# Patient Record
Sex: FEMALE | Race: WHITE | HISPANIC OR LATINO | Employment: FULL TIME | URBAN - METROPOLITAN AREA
[De-identification: names, ages, dates, MRNs, and addresses within clinical notes are randomized per-mention and may not be internally consistent; named-entity substitution may affect disease eponyms.]

---

## 2017-05-14 ENCOUNTER — HOSPITAL ENCOUNTER (EMERGENCY)
Facility: HOSPITAL | Age: 42
Discharge: HOME | End: 2017-05-14
Attending: EMERGENCY MEDICINE | Admitting: EMERGENCY MEDICINE
Payer: COMMERCIAL

## 2017-05-14 ENCOUNTER — APPOINTMENT (EMERGENCY)
Dept: RADIOLOGY | Facility: HOSPITAL | Age: 42
End: 2017-05-14
Payer: COMMERCIAL

## 2017-05-14 VITALS
OXYGEN SATURATION: 99 % | HEART RATE: 95 BPM | BODY MASS INDEX: 21.84 KG/M2 | WEIGHT: 123.24 LBS | RESPIRATION RATE: 18 BRPM | TEMPERATURE: 98 F | HEIGHT: 63 IN | DIASTOLIC BLOOD PRESSURE: 63 MMHG | SYSTOLIC BLOOD PRESSURE: 131 MMHG

## 2017-05-14 DIAGNOSIS — S89.92XA LEFT KNEE INJURY, INITIAL ENCOUNTER: Primary | ICD-10-CM

## 2017-05-14 PROCEDURE — 73564 X-RAY EXAM KNEE 4 OR MORE: CPT

## 2017-05-14 PROCEDURE — 99283 EMERGENCY DEPT VISIT LOW MDM: CPT

## 2017-05-18 ENCOUNTER — ALLSCRIPTS OFFICE VISIT (OUTPATIENT)
Dept: OTHER | Facility: OTHER | Age: 42
End: 2017-05-18

## 2017-05-18 ENCOUNTER — TRANSCRIBE ORDERS (OUTPATIENT)
Dept: ADMINISTRATIVE | Facility: HOSPITAL | Age: 42
End: 2017-05-18

## 2017-05-18 DIAGNOSIS — M23.92 DERANGEMENT OF COLLATERAL LIGAMENT OF LEFT KNEE: Primary | ICD-10-CM

## 2017-05-18 DIAGNOSIS — M23.92 INTERNAL DERANGEMENT OF LEFT KNEE: ICD-10-CM

## 2017-10-04 ENCOUNTER — ALLSCRIPTS OFFICE VISIT (OUTPATIENT)
Dept: OTHER | Facility: OTHER | Age: 42
End: 2017-10-04

## 2017-10-04 DIAGNOSIS — R07.89 OTHER CHEST PAIN: ICD-10-CM

## 2017-10-04 DIAGNOSIS — F43.9 REACTION TO SEVERE STRESS: ICD-10-CM

## 2017-10-04 DIAGNOSIS — R68.82 DECREASED LIBIDO: ICD-10-CM

## 2017-10-04 DIAGNOSIS — K21.9 GASTRO-ESOPHAGEAL REFLUX DISEASE WITHOUT ESOPHAGITIS: ICD-10-CM

## 2017-10-04 DIAGNOSIS — R53.83 OTHER FATIGUE: ICD-10-CM

## 2017-10-04 DIAGNOSIS — G43.909 MIGRAINE WITHOUT STATUS MIGRAINOSUS, NOT INTRACTABLE: ICD-10-CM

## 2017-10-05 NOTE — PROGRESS NOTES
Assessment  1  Esophageal reflux (530 81) (K21 9)   2  Migraine headache (346 90) (G43 909)   3  Decreased libido without sexual dysfunction (799 81) (R68 82)   4  Fatigue (780 79) (R53 83)   5  Stress (V62 89) (F43 9)   6  Atypical chest pain (786 59) (R07 89)   7  Encounter for screening mammogram for malignant neoplasm of breast (V76 12)   (Z12 31)    Plan  Atypical chest pain, Decreased libido without sexual dysfunction, Esophageal reflux,  Fatigue, Migraine headache, Stress    · (1) LIPID PANEL, FASTING; Status:Active; Requested for:04Oct2017;   Decreased libido without sexual dysfunction, Esophageal reflux, Fatigue, Migraine  headache, Stress    · (1) CBC/PLT/DIFF; Status:Active; Requested for:04Oct2017;    · (1) COMPREHENSIVE METABOLIC PANEL; Status:Active; Requested RQY:[id number];    · (1) FERRITIN; Status:Active; Requested for:04Oct2017;    · (1) TSH; Status:Active; Requested for:04Oct2017;   Encounter for screening mammogram for malignant neoplasm of breast    · * MAMMO SCREENING BILATERAL W CAD; Status:Hold For - Scheduling; Requested  for:79Fzp7628;   Esophageal reflux    · Omeprazole 40 MG Oral Capsule Delayed Release; TAKE ONE CAPSULE BY  MOUTH ONE TIME DAILY    Discussion/Summary    Check labs, will call with results  check mammo upon turning 42  consider counseling, but relationship may lack the emotional component as patient and partner want different things out of the relationship  Possible side effects of new medications were reviewed with the patient/guardian today  The treatment plan was reviewed with the patient/guardian  The patient/guardian understands and agrees with the treatment plan      Chief Complaint  patient presented here for medication refills      History of Present Illness  h/o GERD dx'd by barium swallow  c/o stress which presents as chest pain, difficulty swallowing  all these sx improve with omeprazole   still with chronic headache, which improved after stopping OCP and switching to paraguard  h/a appear in the L side of her head, last for seconds, ever since 2003 when she was dx'd with lyme  also h/o cluster h/a/ migraines  headaches have not worsened in intensity or freq  c/o decreased libido  some discord in the relationship  not  with with her partner for over 15 yrs  pt is very work driven, self sufficient, loves her partner, but may not be in love with him  denies sexual dysfunction physiologically, just decreased interest, multiple factors, fatigue,works long hours  tries to have honest conversation with her partner about her feelings  Active Problems  1  Depression screening (V79 0) (Z13 89)   2  Esophageal reflux (530 81) (K21 9)   3  Migraine headache (346 90) (G43 909)    Past Medical History  1  History of Acute pain of left knee (719 46) (M25 562)   2  Acute pharyngitis (462) (J02 9)   3  Acute sinusitis (461 9) (J01 90)   4  History of Internal derangement of knee joint, left (717 9) (M23 92)    The active problems and past medical history were reviewed and updated today  Family History  Mother    1  Family history of thyroid disease (V18 19) (Z83 49)  Sister    2  Family history of thyroid disease (V18 19) (Z83 49)  Maternal Grandmother    3  Family history of cardiac disorder (V17 49) (Z82 49)   4  Family history of thyroid disease (V18 19) (Z83 49)    The family history was reviewed and updated today  Social History   · Being A Social Drinker   · Birth Control   · Never a smoker  The social history was reviewed and updated today  Current Meds   1  Naproxen 500 MG Oral Tablet; TAKE 1 TABLET TWICE DAILY AS NEEDED FOR   HEADACHE; Therapy: 23CXZ8886 to (44 279 135)  Requested for: 72Qdl0873; Last   Rx:[id number] Ordered   2  Omeprazole 40 MG Oral Capsule Delayed Release; TAKE ONE (1) CAPSULE(S) DAILY;    Therapy: 62VHZ8444 to (94 31 11)  Requested for: 79Xmo7827; Last   Rx:[id number] Ordered    The medication list was reviewed and updated today  Allergies  1  Penicillins    Vitals  Vital Signs    Recorded: 48FYM6465 05:05PM   Temperature 99 F, Tympanic   Heart Rate 96   Pulse Quality Normal   Respiration Quality Normal   Respiration 16   Systolic 001, LUE, Sitting   Diastolic 62, LUE, Sitting   Height 5 ft 3 5 in   Weight 123 lb    BMI Calculated 21 45   BSA Calculated 1 58   O2 Saturation 98   LMP 79Ony7468     Physical Exam    Constitutional   General appearance: No acute distress, well appearing and well nourished  Pulmonary   Respiratory effort: No increased work of breathing or signs of respiratory distress  Auscultation of lungs: Clear to auscultation  Cardiovascular   Auscultation of heart: Normal rate and rhythm, normal S1 and S2, without murmurs  Examination of extremities for edema and/or varicosities: Normal     Abdomen   Abdomen: Non-tender, no masses  Liver and spleen: No hepatomegaly or splenomegaly      Psychiatric   Orientation to person, place, and time: Normal     Mood and affect: Normal          Signatures   Electronically signed by : Penelope Kocher, M D ; Oct  4 2017  7:03PM EST                       (Author)

## 2017-12-06 DIAGNOSIS — Z12.31 ENCOUNTER FOR SCREENING MAMMOGRAM FOR MALIGNANT NEOPLASM OF BREAST: ICD-10-CM

## 2018-01-12 NOTE — RESULT NOTES
Verified Results  (1) THROAT CULTURE (CULTURE, UPPER RESPIRATORY) [id number] 02:18PM [facility]     Test Name Result Flag Reference   CLINICAL REPORT (Report)     Test:        Throat culture  Specimen Type:   Throat  Specimen Date:   7/7/2016 2:18 PM  Result Date:    7/9/2016 9:29 AM  Result Status:   Final result  Resulting Lab:   [address] [address]            Tel: [phone]      CULTURE                                       ------------------                                   Negative for beta-hemolytic Streptococcus

## 2018-01-13 VITALS
TEMPERATURE: 99 F | RESPIRATION RATE: 16 BRPM | SYSTOLIC BLOOD PRESSURE: 104 MMHG | BODY MASS INDEX: 21 KG/M2 | HEART RATE: 96 BPM | WEIGHT: 123 LBS | HEIGHT: 64 IN | DIASTOLIC BLOOD PRESSURE: 62 MMHG | OXYGEN SATURATION: 98 %

## 2018-01-13 VITALS
DIASTOLIC BLOOD PRESSURE: 76 MMHG | BODY MASS INDEX: 20.93 KG/M2 | SYSTOLIC BLOOD PRESSURE: 121 MMHG | HEART RATE: 87 BPM | WEIGHT: 122.63 LBS | HEIGHT: 64 IN

## 2018-01-18 NOTE — MISCELLANEOUS
Message   Recorded as Task   Date: 07/11/2016 08:30 AM, Created By: Ronan Cee   Task Name: Call Patient with results   Assigned To: Ronan Cee   Regarding Patient: Isabela Glover, Status: In Progress   CommentWklauspricila King - 11 Jul 2016 8:30 AM     Patient Phone: [phone]    throat   culture is  normal    Jitendra Chan - 11 Jul 2016 9:23 AM     TASK EDITED  PT STATES HER LEFT SIDE OF HER THROAT IS A LIITLE PAINFUL AND IT IS DIFFICULT TO Debi Mulch   Jitendra Chan - 11 Jul 2016 9:23 AM     TASK REASSIGNED: Previously Assigned To Chales Spurling - 11 Jul 2016 11:32 AM     TASK EDITED  left  message   Ronan Cee - 11 Jul 2016 11:32 AM     TASK IN PROGRESS   spoke with pt hse still has sore thraot and swollen glands   throat culture is negative  pt finished azithromycin  pt denies fever  will check for mono and cbc  change antibiotic to cipro 250 mg bid and add prednisone 10 mg bid      Plan  Acute pharyngitis    · Ciprofloxacin HCl - 250 MG Oral Tablet; TAKE 1 TABLET TWICE DAILY AS  DIRECTED   · PredniSONE 10 MG Oral Tablet; TAKE 1 TABLET TWICE DAILY   · (1) CBC/PLT/DIFF; Status:Active; Requested for:56Hts3456;    · (1) MONO TEST; Status:Active;  Requested for:42Wta9061;     Signatures   Electronically signed by : Tracey Candelario, [facility]; Jul 11 2016  1:03PM EST                       (Author)

## 2018-02-02 ENCOUNTER — TELEPHONE (OUTPATIENT)
Dept: FAMILY MEDICINE CLINIC | Facility: CLINIC | Age: 43
End: 2018-02-02

## 2018-02-02 DIAGNOSIS — Z20.828 EXPOSURE TO INFLUENZA: Primary | ICD-10-CM

## 2018-02-02 RX ORDER — OSELTAMIVIR PHOSPHATE 75 MG/1
75 CAPSULE ORAL DAILY
Qty: 10 CAPSULE | Refills: 0 | Status: SHIPPED | OUTPATIENT
Start: 2018-02-02 | End: 2018-02-12

## 2018-02-27 ENCOUNTER — OFFICE VISIT (OUTPATIENT)
Dept: OBGYN CLINIC | Facility: MEDICAL CENTER | Age: 43
End: 2018-02-27
Payer: COMMERCIAL

## 2018-02-27 ENCOUNTER — LAB (OUTPATIENT)
Dept: LAB | Facility: MEDICAL CENTER | Age: 43
End: 2018-02-27
Payer: COMMERCIAL

## 2018-02-27 VITALS
HEIGHT: 64 IN | SYSTOLIC BLOOD PRESSURE: 120 MMHG | WEIGHT: 126 LBS | DIASTOLIC BLOOD PRESSURE: 78 MMHG | BODY MASS INDEX: 21.51 KG/M2

## 2018-02-27 DIAGNOSIS — Z12.39 SCREENING FOR MALIGNANT NEOPLASM OF BREAST: ICD-10-CM

## 2018-02-27 DIAGNOSIS — Z00.00 HEALTHCARE MAINTENANCE: ICD-10-CM

## 2018-02-27 DIAGNOSIS — F17.200 TOBACCO DEPENDENCE: ICD-10-CM

## 2018-02-27 DIAGNOSIS — N92.1 IRREGULAR INTERMENSTRUAL BLEEDING: ICD-10-CM

## 2018-02-27 DIAGNOSIS — Z12.4 PAPANICOLAOU SMEAR FOR CERVICAL CANCER SCREENING: ICD-10-CM

## 2018-02-27 DIAGNOSIS — Z01.419 ENCOUNTER FOR GYNECOLOGICAL EXAMINATION WITHOUT ABNORMAL FINDING: Primary | ICD-10-CM

## 2018-02-27 PROBLEM — R53.83 FATIGUE: Status: ACTIVE | Noted: 2017-10-04

## 2018-02-27 PROBLEM — R53.83 FATIGUE: Status: RESOLVED | Noted: 2017-10-04 | Resolved: 2018-02-27

## 2018-02-27 PROBLEM — R07.89 ATYPICAL CHEST PAIN: Status: ACTIVE | Noted: 2017-10-04

## 2018-02-27 PROBLEM — R68.82 DECREASED LIBIDO WITHOUT SEXUAL DYSFUNCTION: Status: ACTIVE | Noted: 2017-10-04

## 2018-02-27 LAB
BASOPHILS # BLD AUTO: 0.03 THOUSANDS/ΜL (ref 0–0.1)
BASOPHILS NFR BLD AUTO: 0 % (ref 0–1)
EOSINOPHIL # BLD AUTO: 0.29 THOUSAND/ΜL (ref 0–0.61)
EOSINOPHIL NFR BLD AUTO: 2 % (ref 0–6)
ERYTHROCYTE [DISTWIDTH] IN BLOOD BY AUTOMATED COUNT: 13.9 % (ref 11.6–15.1)
EST. AVERAGE GLUCOSE BLD GHB EST-MCNC: 114 MG/DL
HBA1C MFR BLD: 5.6 % (ref 4.2–6.3)
HCT VFR BLD AUTO: 39.6 % (ref 34.8–46.1)
HGB BLD-MCNC: 13.4 G/DL (ref 11.5–15.4)
LYMPHOCYTES # BLD AUTO: 5.68 THOUSANDS/ΜL (ref 0.6–4.47)
LYMPHOCYTES NFR BLD AUTO: 44 % (ref 14–44)
MCH RBC QN AUTO: 32 PG (ref 26.8–34.3)
MCHC RBC AUTO-ENTMCNC: 33.8 G/DL (ref 31.4–37.4)
MCV RBC AUTO: 95 FL (ref 82–98)
MONOCYTES # BLD AUTO: 0.91 THOUSAND/ΜL (ref 0.17–1.22)
MONOCYTES NFR BLD AUTO: 7 % (ref 4–12)
NEUTROPHILS # BLD AUTO: 5.87 THOUSANDS/ΜL (ref 1.85–7.62)
NEUTS SEG NFR BLD AUTO: 47 % (ref 43–75)
NRBC BLD AUTO-RTO: 0 /100 WBCS
PLATELET # BLD AUTO: 332 THOUSANDS/UL (ref 149–390)
PMV BLD AUTO: 9.9 FL (ref 8.9–12.7)
RBC # BLD AUTO: 4.19 MILLION/UL (ref 3.81–5.12)
SL AMB POCT URINE HCG: NEGATIVE
TSH SERPL DL<=0.05 MIU/L-ACNC: 2.07 UIU/ML (ref 0.36–3.74)
WBC # BLD AUTO: 12.82 THOUSAND/UL (ref 4.31–10.16)

## 2018-02-27 PROCEDURE — 36415 COLL VENOUS BLD VENIPUNCTURE: CPT

## 2018-02-27 PROCEDURE — 87624 HPV HI-RISK TYP POOLED RSLT: CPT | Performed by: NURSE PRACTITIONER

## 2018-02-27 PROCEDURE — 99386 PREV VISIT NEW AGE 40-64: CPT | Performed by: NURSE PRACTITIONER

## 2018-02-27 PROCEDURE — 81025 URINE PREGNANCY TEST: CPT | Performed by: NURSE PRACTITIONER

## 2018-02-27 PROCEDURE — 84443 ASSAY THYROID STIM HORMONE: CPT

## 2018-02-27 PROCEDURE — 83036 HEMOGLOBIN GLYCOSYLATED A1C: CPT

## 2018-02-27 PROCEDURE — 85025 COMPLETE CBC W/AUTO DIFF WBC: CPT

## 2018-02-27 PROCEDURE — G0145 SCR C/V CYTO,THINLAYER,RESCR: HCPCS | Performed by: NURSE PRACTITIONER

## 2018-02-27 RX ORDER — COPPER 313.4 MG/1
INTRAUTERINE DEVICE INTRAUTERINE
COMMUNITY
Start: 2016-06-03 | End: 2020-07-08 | Stop reason: ALTCHOICE

## 2018-02-27 RX ORDER — OMEPRAZOLE 40 MG/1
1 CAPSULE, DELAYED RELEASE ORAL DAILY
COMMUNITY
Start: 2014-03-04 | End: 2018-02-27 | Stop reason: ALTCHOICE

## 2018-02-27 RX ORDER — NAPROXEN 500 MG/1
TABLET ORAL 2 TIMES DAILY
COMMUNITY
Start: 2012-08-14 | End: 2018-02-27 | Stop reason: ALTCHOICE

## 2018-02-27 NOTE — PROGRESS NOTES
Please call the patient regarding her results: Hemoglobin A1C is normal  Thyroid level is normal  WBCs are slightly elevated, may be a wide variety of reasons for this  Can follow-up with PCP if she'd like  Thanks!

## 2018-02-27 NOTE — PROGRESS NOTES
[facility]  ANNUAL GYNECOLOGIC EXAM  Claus Kee 38 y o  SUBJECTIVE    HPI: Claus Kee is a 38 y o  [id number] new patient female presenting today for annual GYN exam  Her last gynecologic exam was in  at [facility] Patient's last menstrual period was 2018  Had Paragard IUD placed 2016  Recently since November has had irregular intermenstrual vaginal bleeding  She had a period in November, then went 41 days without menses, then had multiple episodes of bleeding thus far in February  Has not checked for her own IUD strings  Unsure if periods were heavy prior to IUD insertion because she has been on some form of hormonal contraception since age 14, keeping her periods light  Does note significant life/family stress since November  Sexually active with 1 Male partner, monogamous relationship  Engaged to be   Declines STI testing today  Denies sexual concerns aside from low libido, ongoing  Smokes 0 5PPD x20y  Would be interested in quitting  Has no breast, bowel, bladder, or vaginal concerns  Works as a  for a [employer]  Busy with work  Gynecologic History   Last pap smear: Date:  and Result: Normal (per patient report, no records available today for viewing)  History of abnormal pap smears: Yes  The patient denies history of sexually transmitted disease  Contraceptive method: Copper IUD      Obstetric History    SAB x2   x1 (, born 3 5 weeks early)  Desires conception in the next year: No    Health Maintenance  Self-breast exams: yes      The following portions of the patient's history were reviewed and updated as appropriate: allergies, current medications, past family history, past medical history, past social history, past surgical history and problem list     At todays visit I discussed the importance of self-breast exams and awareness  We discussed the importance of exercise and healthy diet   I reviewed ASCCP guidelines for cervical cancer screening  Safe sexual practices were strongly encouraged to protect against sexually transmitted infections  We reviewed her reproductive life plan  All questions were answered to her apparent satisfaction  ROS  General ROS: negative for chills or fever  Breast ROS: negative for breast lumps  Respiratory ROS: no cough, shortness of breath, or wheezing  Cardiovascular ROS: no chest pain or dyspnea on exertion  Gastrointestinal ROS: no abdominal pain, change in bowel habits, or black or bloody stools  Genito-Urinary ROS: no dysuria, trouble voiding, or hematuria  negative for - pelvic pain; positive for: see HPI  Neurological ROS: negative    OBJECTIVE  Vitals:    02/27/18 0751   BP: 120/78   BP Location: Left arm   Patient Position: Sitting   Weight: 57 2 kg (126 lb)   Height: 5' 4" (1 626 m)     Urine Pregnancy Test: Negative    Physical Exam   Constitutional: She is oriented to person, place, and time  Vital signs are normal  She appears well-developed and well-nourished  Genitourinary: Vagina normal and uterus normal  Pelvic exam was performed with patient supine  There is no rash, tenderness or lesion on the right labia  There is no rash, tenderness or lesion on the left labia  No erythema in the vagina  No vaginal discharge found  Right adnexum does not display mass and does not display tenderness  Left adnexum does not display mass and does not display tenderness  Cervix exhibits visible IUD strings  Cervix does not exhibit motion tenderness  Uterus is anteverted  Uterus is not tender  HENT:   Head: Normocephalic and atraumatic  Neck: No thyromegaly present  Cardiovascular: Normal heart sounds  Pulmonary/Chest: Effort normal and breath sounds normal  Right breast exhibits no inverted nipple, no mass, no nipple discharge, no skin change and no tenderness   Left breast exhibits no inverted nipple, no mass, no nipple discharge, no skin change and no tenderness  Breasts are symmetrical    Abdominal: Soft  Normal appearance  Musculoskeletal: Normal range of motion  Lymphadenopathy:     She has no axillary adenopathy  Neurological: She is alert and oriented to person, place, and time  Skin: Skin is warm, dry and intact  Psychiatric: She has a normal mood and affect  Her behavior is normal    Vitals reviewed  ASSESSMENT  Normal female exam  Tobacco dependence  Irregular intermenstrual bleeding in setting of IUD          PLAN  1 - On exam today, Karine's IUD strings appear to be about 3cm in length and normal appearing  Will send for pelvic US to evaluate placement, along with TSH and CBC as part of irregular bleeding workup  May be normal bleeding attributable to IUD, or related to stress/hypothalamic dysfunction  Will F/U after results return  2 - Pap smear with HPV co-testing today  3 - Given CDC's resources for QuitLine and tobacco cessation strategies  Discussed options for quitting, including nicotine gum/patch  Encouraged her to consider this  4 - Annual mammogram slip given to patient today  5 - RTO for annual GYN exam in 1y  6 - Paragard due for replacement in 2026      All questions were answered & Kimmy Zhang expressed understanding        Jen Floyd

## 2018-03-01 ENCOUNTER — OFFICE VISIT (OUTPATIENT)
Dept: FAMILY MEDICINE CLINIC | Facility: CLINIC | Age: 43
End: 2018-03-01
Payer: COMMERCIAL

## 2018-03-01 VITALS
RESPIRATION RATE: 16 BRPM | DIASTOLIC BLOOD PRESSURE: 68 MMHG | WEIGHT: 126 LBS | OXYGEN SATURATION: 98 % | SYSTOLIC BLOOD PRESSURE: 106 MMHG | HEART RATE: 90 BPM | BODY MASS INDEX: 21.51 KG/M2 | HEIGHT: 64 IN | TEMPERATURE: 98.7 F

## 2018-03-01 DIAGNOSIS — D72.828 OTHER ELEVATED WHITE BLOOD CELL (WBC) COUNT: Primary | ICD-10-CM

## 2018-03-01 DIAGNOSIS — Z87.898 HISTORY OF NIGHT SWEATS: ICD-10-CM

## 2018-03-01 LAB — HPV RRNA GENITAL QL NAA+PROBE: NORMAL

## 2018-03-01 PROCEDURE — 3008F BODY MASS INDEX DOCD: CPT | Performed by: FAMILY MEDICINE

## 2018-03-01 PROCEDURE — 4004F PT TOBACCO SCREEN RCVD TLK: CPT | Performed by: FAMILY MEDICINE

## 2018-03-01 PROCEDURE — 99214 OFFICE O/P EST MOD 30 MIN: CPT | Performed by: FAMILY MEDICINE

## 2018-03-01 NOTE — PROGRESS NOTES
Assessment/Plan:         There are no diagnoses linked to this encounter  Subjective:      Patient ID: Cecy Montes is a 38 y o  female  Here for elev WBC from recent labs with GYN, happened to have wisdom teeth removed 10 days prior, was on oral steroids and abx for 10days  No fevers  Notes night sweats occurring in clusters, wakes drenched at times  perimenopause, no TB exposure  finihsed the steroids 2 days and ago and the abx yesterday        The following portions of the patient's history were reviewed and updated as appropriate: allergies, current medications, past family history, past medical history, past social history, past surgical history and problem list     Review of Systems   Constitutional: Positive for diaphoresis (night sweats)  Negative for fever  HENT: Negative  Eyes: Negative  Respiratory: Negative  Cardiovascular: Negative  Gastrointestinal: Positive for abdominal pain (LLQ pain under eval with GYN)  Endocrine: Positive for polyuria  Genitourinary: Negative  Musculoskeletal: Negative  Skin: Negative  Neurological: Positive for dizziness (from steroids)  Hematological: Negative  Psychiatric/Behavioral: Negative  Objective:      /68 (BP Location: Left arm, Patient Position: Sitting, Cuff Size: Standard)   Pulse 90   Temp 98 7 °F (37 1 °C)   Resp 16   Ht 5' 4" (1 626 m)   Wt 57 2 kg (126 lb)   LMP 02/02/2018 (Approximate)   SpO2 98%   BMI 21 63 kg/m²          Physical Exam   Constitutional: She is oriented to person, place, and time  She appears well-developed and well-nourished  Cardiovascular: Normal rate, regular rhythm and normal heart sounds  Pulmonary/Chest: Effort normal and breath sounds normal    Abdominal: Soft  Bowel sounds are normal  She exhibits no distension and no mass  There is tenderness (llq)  There is no rebound and no guarding     Lymphadenopathy:        Head (right side): No submental and no submandibular adenopathy present  Head (left side): No submental and no submandibular adenopathy present  She has no cervical adenopathy  She has no axillary adenopathy  Right: No inguinal and no supraclavicular adenopathy present  Left: No inguinal and no supraclavicular adenopathy present  Neurological: She is alert and oriented to person, place, and time  Psychiatric: She has a normal mood and affect   Her behavior is normal  Judgment and thought content normal

## 2018-03-06 LAB
LAB AP GYN PRIMARY INTERPRETATION: NORMAL
Lab: NORMAL

## 2018-03-06 NOTE — PROGRESS NOTES
Called and spoke with patient and notified her of normal pap smear and HPV negative results  Pt verbalized understanding

## 2018-03-08 ENCOUNTER — HOSPITAL ENCOUNTER (OUTPATIENT)
Dept: ULTRASOUND IMAGING | Facility: HOSPITAL | Age: 43
Discharge: HOME | End: 2018-03-08
Payer: COMMERCIAL

## 2018-03-08 DIAGNOSIS — N92.1 IRREGULAR INTERMENSTRUAL BLEEDING: ICD-10-CM

## 2018-03-08 PROCEDURE — 76830 TRANSVAGINAL US NON-OB: CPT

## 2018-03-08 PROCEDURE — 76856 US EXAM PELVIC COMPLETE: CPT

## 2018-03-12 ENCOUNTER — TELEPHONE (OUTPATIENT)
Dept: OBGYN CLINIC | Facility: CLINIC | Age: 43
End: 2018-03-12

## 2018-03-12 NOTE — TELEPHONE ENCOUNTER
I spoke with Kimmy Zhang, results were given  Has had no bleeding until Saturday  She has had 3 days of cramping and light bleeding  She is asking what is the next step

## 2018-03-12 NOTE — TELEPHONE ENCOUNTER
You can let Margaret Lopez know that per the radiologist's report the IUD is within the uterus as it should be  They saw some follicular cysts on the right ovary, these should regress with cyclic menses  Some small sub-endometrial cysts that are likely of no clinical significance - so overall, unremarkable findings  Can you inquire how her bleeding has been? Thank you

## 2018-03-13 NOTE — TELEPHONE ENCOUNTER
Patient said she took a pregnancy test at her visit with youjen other information was given to patient

## 2018-03-13 NOTE — TELEPHONE ENCOUNTER
Please have her take a home pregnancy test  She can try NSAIDs for the cramping and bleeding  If it continues and is bothersome, we can consider removal of IUD if desired by patient  Please let me know results of HPT  Thanks

## 2018-04-19 ENCOUNTER — LAB REQUISITION (OUTPATIENT)
Dept: LAB | Facility: HOSPITAL | Age: 43
End: 2018-04-19
Payer: COMMERCIAL

## 2018-04-19 DIAGNOSIS — R61 GENERALIZED HYPERHIDROSIS: ICD-10-CM

## 2018-04-19 DIAGNOSIS — R20.2 PARESTHESIA OF SKIN: ICD-10-CM

## 2018-04-19 DIAGNOSIS — Z00.01 ENCOUNTER FOR GENERAL ADULT MEDICAL EXAMINATION WITH ABNORMAL FINDINGS: ICD-10-CM

## 2018-04-19 DIAGNOSIS — M25.50 PAIN IN JOINT: ICD-10-CM

## 2018-04-19 DIAGNOSIS — R53.83 OTHER FATIGUE: ICD-10-CM

## 2018-04-19 LAB
ALBUMIN SERPL BCP-MCNC: 4.2 G/DL (ref 3.5–5)
ALP SERPL-CCNC: 82 U/L (ref 46–116)
ALT SERPL W P-5'-P-CCNC: 14 U/L (ref 12–78)
ANION GAP SERPL CALCULATED.3IONS-SCNC: 7 MMOL/L (ref 4–13)
AST SERPL W P-5'-P-CCNC: 13 U/L (ref 5–45)
BASOPHILS # BLD AUTO: 0.02 THOUSANDS/ΜL (ref 0–0.1)
BASOPHILS NFR BLD AUTO: 0 % (ref 0–1)
BILIRUB SERPL-MCNC: 0.44 MG/DL (ref 0.2–1)
BUN SERPL-MCNC: 12 MG/DL (ref 5–25)
CALCIUM SERPL-MCNC: 9.1 MG/DL (ref 8.3–10.1)
CHLORIDE SERPL-SCNC: 105 MMOL/L (ref 100–108)
CO2 SERPL-SCNC: 26 MMOL/L (ref 21–32)
CREAT SERPL-MCNC: 0.74 MG/DL (ref 0.6–1.3)
CRP SERPL QL: <3 MG/L
EOSINOPHIL # BLD AUTO: 0.07 THOUSAND/ΜL (ref 0–0.61)
EOSINOPHIL NFR BLD AUTO: 1 % (ref 0–6)
ERYTHROCYTE [DISTWIDTH] IN BLOOD BY AUTOMATED COUNT: 13.4 % (ref 11.6–15.1)
ERYTHROCYTE [SEDIMENTATION RATE] IN BLOOD: 4 MM/HOUR (ref 0–20)
EST. AVERAGE GLUCOSE BLD GHB EST-MCNC: 100 MG/DL
GFR SERPL CREATININE-BSD FRML MDRD: 102 ML/MIN/1.73SQ M
GLUCOSE SERPL-MCNC: 85 MG/DL (ref 65–140)
HBA1C MFR BLD: 5.1 % (ref 4.2–6.3)
HCT VFR BLD AUTO: 41.6 % (ref 34.8–46.1)
HGB BLD-MCNC: 14.6 G/DL (ref 11.5–15.4)
LYMPHOCYTES # BLD AUTO: 2.74 THOUSANDS/ΜL (ref 0.6–4.47)
LYMPHOCYTES NFR BLD AUTO: 33 % (ref 14–44)
MCH RBC QN AUTO: 32.7 PG (ref 26.8–34.3)
MCHC RBC AUTO-ENTMCNC: 35.1 G/DL (ref 31.4–37.4)
MCV RBC AUTO: 93 FL (ref 82–98)
MONOCYTES # BLD AUTO: 0.51 THOUSAND/ΜL (ref 0.17–1.22)
MONOCYTES NFR BLD AUTO: 6 % (ref 4–12)
NEUTROPHILS # BLD AUTO: 4.85 THOUSANDS/ΜL (ref 1.85–7.62)
NEUTS SEG NFR BLD AUTO: 60 % (ref 43–75)
NRBC BLD AUTO-RTO: 0 /100 WBCS
PLATELET # BLD AUTO: 287 THOUSANDS/UL (ref 149–390)
PMV BLD AUTO: 10.1 FL (ref 8.9–12.7)
POTASSIUM SERPL-SCNC: 4.3 MMOL/L (ref 3.5–5.3)
PROT SERPL-MCNC: 7.3 G/DL (ref 6.4–8.2)
RBC # BLD AUTO: 4.47 MILLION/UL (ref 3.81–5.12)
SODIUM SERPL-SCNC: 138 MMOL/L (ref 136–145)
T3 SERPL-MCNC: 1.3 NG/ML (ref 0.6–1.8)
T4 FREE SERPL-MCNC: 1.13 NG/DL (ref 0.76–1.46)
TSH SERPL DL<=0.05 MIU/L-ACNC: 0.87 UIU/ML (ref 0.36–3.74)
WBC # BLD AUTO: 8.2 THOUSAND/UL (ref 4.31–10.16)

## 2018-04-19 PROCEDURE — 84439 ASSAY OF FREE THYROXINE: CPT | Performed by: NURSE PRACTITIONER

## 2018-04-19 PROCEDURE — 86592 SYPHILIS TEST NON-TREP QUAL: CPT | Performed by: NURSE PRACTITIONER

## 2018-04-19 PROCEDURE — 84480 ASSAY TRIIODOTHYRONINE (T3): CPT | Performed by: NURSE PRACTITIONER

## 2018-04-19 PROCEDURE — 86430 RHEUMATOID FACTOR TEST QUAL: CPT | Performed by: NURSE PRACTITIONER

## 2018-04-19 PROCEDURE — 86140 C-REACTIVE PROTEIN: CPT | Performed by: NURSE PRACTITIONER

## 2018-04-19 PROCEDURE — 83036 HEMOGLOBIN GLYCOSYLATED A1C: CPT | Performed by: NURSE PRACTITIONER

## 2018-04-19 PROCEDURE — 85025 COMPLETE CBC W/AUTO DIFF WBC: CPT | Performed by: NURSE PRACTITIONER

## 2018-04-19 PROCEDURE — 84443 ASSAY THYROID STIM HORMONE: CPT | Performed by: NURSE PRACTITIONER

## 2018-04-19 PROCEDURE — 85652 RBC SED RATE AUTOMATED: CPT | Performed by: NURSE PRACTITIONER

## 2018-04-19 PROCEDURE — 86038 ANTINUCLEAR ANTIBODIES: CPT | Performed by: NURSE PRACTITIONER

## 2018-04-19 PROCEDURE — 80053 COMPREHEN METABOLIC PANEL: CPT | Performed by: NURSE PRACTITIONER

## 2018-04-20 LAB
RHEUMATOID FACT SER QL LA: NEGATIVE
RPR SER QL: NORMAL
RYE IGE QN: NEGATIVE

## 2018-05-21 DIAGNOSIS — B37.9 CANDIDIASIS: Primary | ICD-10-CM

## 2018-05-21 RX ORDER — FLUCONAZOLE 150 MG/1
150 TABLET ORAL ONCE
Qty: 3 TABLET | Refills: 0 | Status: SHIPPED | OUTPATIENT
Start: 2018-05-21 | End: 2018-05-21

## 2018-10-27 ENCOUNTER — TRANSCRIBE ORDERS (OUTPATIENT)
Dept: ADMINISTRATIVE | Facility: HOSPITAL | Age: 43
End: 2018-10-27

## 2018-10-27 DIAGNOSIS — R41.3 MEMORY LOSS: Primary | ICD-10-CM

## 2018-10-27 DIAGNOSIS — M54.2 NECK PAIN: ICD-10-CM

## 2018-11-02 ENCOUNTER — HOSPITAL ENCOUNTER (OUTPATIENT)
Dept: MAMMOGRAPHY | Facility: HOSPITAL | Age: 43
Discharge: HOME | End: 2018-11-02
Payer: COMMERCIAL

## 2018-11-02 DIAGNOSIS — Z12.39 SCREENING FOR MALIGNANT NEOPLASM OF BREAST: ICD-10-CM

## 2018-11-02 PROCEDURE — 77067 SCR MAMMO BI INCL CAD: CPT

## 2018-11-17 ENCOUNTER — HOSPITAL ENCOUNTER (OUTPATIENT)
Dept: MRI IMAGING | Facility: HOSPITAL | Age: 43
Discharge: HOME | End: 2018-11-17
Payer: COMMERCIAL

## 2018-11-17 DIAGNOSIS — R41.3 MEMORY LOSS: ICD-10-CM

## 2018-11-17 DIAGNOSIS — M54.2 NECK PAIN: ICD-10-CM

## 2018-11-17 PROCEDURE — 70551 MRI BRAIN STEM W/O DYE: CPT

## 2018-11-17 PROCEDURE — 72141 MRI NECK SPINE W/O DYE: CPT

## 2019-01-21 DIAGNOSIS — B37.9 CANDIDIASIS: Primary | ICD-10-CM

## 2019-04-29 ENCOUNTER — APPOINTMENT (OUTPATIENT)
Dept: RADIOLOGY | Facility: MEDICAL CENTER | Age: 44
End: 2019-04-29
Payer: COMMERCIAL

## 2019-04-29 ENCOUNTER — OFFICE VISIT (OUTPATIENT)
Dept: FAMILY MEDICINE CLINIC | Facility: CLINIC | Age: 44
End: 2019-04-29
Payer: COMMERCIAL

## 2019-04-29 VITALS
DIASTOLIC BLOOD PRESSURE: 70 MMHG | SYSTOLIC BLOOD PRESSURE: 100 MMHG | WEIGHT: 125 LBS | OXYGEN SATURATION: 98 % | TEMPERATURE: 98.6 F | HEIGHT: 64 IN | HEART RATE: 79 BPM | BODY MASS INDEX: 21.34 KG/M2 | RESPIRATION RATE: 16 BRPM

## 2019-04-29 DIAGNOSIS — M48.02 CERVICAL STENOSIS OF SPINE: ICD-10-CM

## 2019-04-29 DIAGNOSIS — G56.02 CARPAL TUNNEL SYNDROME OF LEFT WRIST: ICD-10-CM

## 2019-04-29 DIAGNOSIS — M48.02 CERVICAL STENOSIS OF SPINE: Primary | ICD-10-CM

## 2019-04-29 DIAGNOSIS — M54.6 PAIN IN THORACIC SPINE: ICD-10-CM

## 2019-04-29 PROCEDURE — 99214 OFFICE O/P EST MOD 30 MIN: CPT | Performed by: INTERNAL MEDICINE

## 2019-04-29 PROCEDURE — 72072 X-RAY EXAM THORAC SPINE 3VWS: CPT

## 2019-04-29 PROCEDURE — 3008F BODY MASS INDEX DOCD: CPT | Performed by: INTERNAL MEDICINE

## 2019-04-29 RX ORDER — CYCLOBENZAPRINE HCL 10 MG
10 TABLET ORAL 3 TIMES DAILY PRN
Qty: 15 TABLET | Refills: 1 | Status: SHIPPED | OUTPATIENT
Start: 2019-04-29 | End: 2019-10-29 | Stop reason: SDUPTHER

## 2019-04-29 RX ORDER — METHYLPREDNISOLONE 4 MG/1
TABLET ORAL
Qty: 21 EACH | Refills: 0 | Status: SHIPPED | OUTPATIENT
Start: 2019-04-29 | End: 2019-05-09 | Stop reason: ALTCHOICE

## 2019-04-29 RX ORDER — MELOXICAM 15 MG/1
15 TABLET ORAL DAILY
Qty: 30 TABLET | Refills: 1 | Status: SHIPPED | OUTPATIENT
Start: 2019-04-29 | End: 2019-10-29

## 2019-05-01 ENCOUNTER — HOSPITAL ENCOUNTER (OUTPATIENT)
Dept: NEUROLOGY | Facility: CLINIC | Age: 44
Discharge: HOME | End: 2019-05-01
Payer: COMMERCIAL

## 2019-05-01 DIAGNOSIS — G56.02 CARPAL TUNNEL SYNDROME OF LEFT WRIST: ICD-10-CM

## 2019-05-01 PROCEDURE — 95910 NRV CNDJ TEST 7-8 STUDIES: CPT | Performed by: PHYSICAL MEDICINE & REHABILITATION

## 2019-05-01 PROCEDURE — 95886 MUSC TEST DONE W/N TEST COMP: CPT | Performed by: PHYSICAL MEDICINE & REHABILITATION

## 2019-05-02 ENCOUNTER — TELEPHONE (OUTPATIENT)
Dept: FAMILY MEDICINE CLINIC | Facility: CLINIC | Age: 44
End: 2019-05-02

## 2019-05-09 ENCOUNTER — CONSULT (OUTPATIENT)
Dept: PAIN MEDICINE | Facility: MEDICAL CENTER | Age: 44
End: 2019-05-09
Payer: COMMERCIAL

## 2019-05-09 VITALS
BODY MASS INDEX: 21.95 KG/M2 | RESPIRATION RATE: 12 BRPM | SYSTOLIC BLOOD PRESSURE: 138 MMHG | HEART RATE: 98 BPM | WEIGHT: 128.6 LBS | DIASTOLIC BLOOD PRESSURE: 91 MMHG | HEIGHT: 64 IN

## 2019-05-09 DIAGNOSIS — G56.02 CARPAL TUNNEL SYNDROME OF LEFT WRIST: ICD-10-CM

## 2019-05-09 DIAGNOSIS — M48.02 CERVICAL STENOSIS OF SPINE: ICD-10-CM

## 2019-05-09 DIAGNOSIS — M54.12 CERVICAL RADICULOPATHY: Primary | ICD-10-CM

## 2019-05-09 PROCEDURE — 99244 OFF/OP CNSLTJ NEW/EST MOD 40: CPT | Performed by: PHYSICAL MEDICINE & REHABILITATION

## 2019-05-09 RX ORDER — MULTIVITAMIN WITH IRON
TABLET ORAL DAILY
COMMUNITY
End: 2019-10-29

## 2019-05-09 RX ORDER — BIOTIN 1 MG
TABLET ORAL DAILY
COMMUNITY

## 2019-10-28 ENCOUNTER — APPOINTMENT (OUTPATIENT)
Dept: LAB | Facility: HOSPITAL | Age: 44
End: 2019-10-28
Attending: OBSTETRICS & GYNECOLOGY
Payer: COMMERCIAL

## 2019-10-28 ENCOUNTER — OFFICE VISIT (OUTPATIENT)
Dept: OBGYN CLINIC | Facility: CLINIC | Age: 44
End: 2019-10-28
Payer: COMMERCIAL

## 2019-10-28 VITALS
SYSTOLIC BLOOD PRESSURE: 120 MMHG | HEIGHT: 64 IN | DIASTOLIC BLOOD PRESSURE: 72 MMHG | WEIGHT: 127.13 LBS | BODY MASS INDEX: 21.71 KG/M2

## 2019-10-28 DIAGNOSIS — N92.1 MENORRHAGIA WITH IRREGULAR CYCLE: ICD-10-CM

## 2019-10-28 DIAGNOSIS — N92.1 MENORRHAGIA WITH IRREGULAR CYCLE: Primary | ICD-10-CM

## 2019-10-28 DIAGNOSIS — R53.83 FATIGUE, UNSPECIFIED TYPE: ICD-10-CM

## 2019-10-28 DIAGNOSIS — E55.9 VITAMIN D DEFICIENCY: ICD-10-CM

## 2019-10-28 LAB
25(OH)D3 SERPL-MCNC: 50 NG/ML (ref 30–100)
BASOPHILS # BLD AUTO: 0.05 THOUSANDS/ΜL (ref 0–0.1)
BASOPHILS NFR BLD AUTO: 1 % (ref 0–1)
EOSINOPHIL # BLD AUTO: 0.13 THOUSAND/ΜL (ref 0–0.61)
EOSINOPHIL NFR BLD AUTO: 2 % (ref 0–6)
ERYTHROCYTE [DISTWIDTH] IN BLOOD BY AUTOMATED COUNT: 13.1 % (ref 11.6–15.1)
FSH SERPL-ACNC: 47.3 MIU/ML
HCT VFR BLD AUTO: 42.7 % (ref 34.8–46.1)
HGB BLD-MCNC: 14.3 G/DL (ref 11.5–15.4)
IMM GRANULOCYTES # BLD AUTO: 0.02 THOUSAND/UL (ref 0–0.2)
IMM GRANULOCYTES NFR BLD AUTO: 0 % (ref 0–2)
LYMPHOCYTES # BLD AUTO: 2.85 THOUSANDS/ΜL (ref 0.6–4.47)
LYMPHOCYTES NFR BLD AUTO: 33 % (ref 14–44)
MCH RBC QN AUTO: 32.2 PG (ref 26.8–34.3)
MCHC RBC AUTO-ENTMCNC: 33.5 G/DL (ref 31.4–37.4)
MCV RBC AUTO: 96 FL (ref 82–98)
MONOCYTES # BLD AUTO: 0.63 THOUSAND/ΜL (ref 0.17–1.22)
MONOCYTES NFR BLD AUTO: 7 % (ref 4–12)
NEUTROPHILS # BLD AUTO: 4.94 THOUSANDS/ΜL (ref 1.85–7.62)
NEUTS SEG NFR BLD AUTO: 57 % (ref 43–75)
NRBC BLD AUTO-RTO: 0 /100 WBCS
PLATELET # BLD AUTO: 273 THOUSANDS/UL (ref 149–390)
PMV BLD AUTO: 9.6 FL (ref 8.9–12.7)
PROLACTIN SERPL-MCNC: 6.7 NG/ML
RBC # BLD AUTO: 4.44 MILLION/UL (ref 3.81–5.12)
TESTOST SERPL-MCNC: 32 NG/DL
TSH SERPL DL<=0.05 MIU/L-ACNC: 1 UIU/ML (ref 0.36–3.74)
WBC # BLD AUTO: 8.62 THOUSAND/UL (ref 4.31–10.16)

## 2019-10-28 PROCEDURE — 84146 ASSAY OF PROLACTIN: CPT

## 2019-10-28 PROCEDURE — 99214 OFFICE O/P EST MOD 30 MIN: CPT | Performed by: OBSTETRICS & GYNECOLOGY

## 2019-10-28 PROCEDURE — 82306 VITAMIN D 25 HYDROXY: CPT

## 2019-10-28 PROCEDURE — 36415 COLL VENOUS BLD VENIPUNCTURE: CPT

## 2019-10-28 PROCEDURE — 84443 ASSAY THYROID STIM HORMONE: CPT

## 2019-10-28 PROCEDURE — 83001 ASSAY OF GONADOTROPIN (FSH): CPT

## 2019-10-28 PROCEDURE — 84403 ASSAY OF TOTAL TESTOSTERONE: CPT

## 2019-10-28 PROCEDURE — 85025 COMPLETE CBC W/AUTO DIFF WBC: CPT

## 2019-10-28 RX ORDER — ACETAMINOPHEN AND CODEINE PHOSPHATE 120; 12 MG/5ML; MG/5ML
1 SOLUTION ORAL DAILY
Qty: 28 TABLET | Refills: 11 | Status: SHIPPED | OUTPATIENT
Start: 2019-10-28 | End: 2020-07-08

## 2019-10-28 NOTE — PROGRESS NOTES
Assessment/Plan:    Menorrhagia with irregular cycle  IUD in place (paragard 2016)    Options for treatment and causes reviewed  Change IUD to mirena, add progestin daily to control menses, D&C Ablation    Labs ordered to rule out hormonal causes and ultrasound ordered as well  Will call with results  Diagnoses and all orders for this visit:    Menorrhagia with irregular cycle  -     TSH, 3rd generation with Free T4 reflex; Future  -     Follicle stimulating hormone; Future  -     Prolactin; Future  -     Testosterone; Future  -     Vitamin D 25 hydroxy; Future  -     CBC and differential; Future  -     US pelvis complete w transvaginal; Future    Fatigue, unspecified type  -     TSH, 3rd generation with Free T4 reflex; Future  -     Vitamin D 25 hydroxy; Future  -     CBC and differential; Future    Vitamin D deficiency  -     Vitamin D 25 hydroxy; Future          Subjective:      Patient ID: Lux Hanna is a 41 y o  female  Patient here for heavy vaginal bleeding with paragard  Patient states over this last month has gotten 3 periods  41year old G3P 0 1 2 1 with irregular menses this month  3 5 day long periods    Location: pelvis  Quality: red blood, some clots  Radiation:none  Severity:moderate  Onset: 1 year ago first presented with irregular menses  This month three in one month, in the past has gone up to 41 days without a menses  Duration: 5 days  Timing: 3 times per month to 41 days apart  Progression: getting worse  Chronicity:1 year  Relieved:rest  Exacerbated: activity, stress  Treatments tried/result: none  Associated symptoms: fatigue, increase in stress, gained some weight this year        The following portions of the patient's history were reviewed and updated as appropriate:   She  has a past medical history of Internal derangement of knee joint, left and Lyme disease    She   Patient Active Problem List    Diagnosis Date Noted    Menorrhagia with irregular cycle 10/28/2019    Carpal tunnel syndrome of left wrist     Tobacco dependence 02/27/2018    Atypical chest pain 10/04/2017    Decreased libido without sexual dysfunction 10/04/2017    Esophageal reflux 03/04/2014    Migraine headache 05/30/2012     She  has a past surgical history that includes Graham tooth extraction  Her family history includes Heart disease in her maternal grandmother; Thyroid disease in her maternal grandmother, mother, and sister  She  reports that she has been smoking cigarettes  She has a 10 00 pack-year smoking history  She has never used smokeless tobacco  She reports that she drinks alcohol  She reports that she does not use drugs  Current Outpatient Medications   Medication Sig Dispense Refill    Cholecalciferol (VITAMIN D3) 1000 units CAPS Daily      PARAGARD INTRAUTERINE COPPER IUD by Intrauterine route      cyclobenzaprine (FLEXERIL) 10 mg tablet Take 1 tablet (10 mg total) by mouth 3 (three) times a day as needed for muscle spasms (Patient not taking: Reported on 10/28/2019) 15 tablet 1    meloxicam (MOBIC) 15 mg tablet Take 1 tablet (15 mg total) by mouth daily (Patient not taking: Reported on 10/28/2019) 30 tablet 1    Omeprazole 20 MG TBDD omeprazole 20 mg capsule,delayed release   Take 2 capsules as needed by oral route   pyridoxine (VITAMIN B6) 100 mg tablet Daily       No current facility-administered medications for this visit        Current Outpatient Medications on File Prior to Visit   Medication Sig    Cholecalciferol (VITAMIN D3) 1000 units CAPS Daily    PARAGARD INTRAUTERINE COPPER IUD by Intrauterine route    cyclobenzaprine (FLEXERIL) 10 mg tablet Take 1 tablet (10 mg total) by mouth 3 (three) times a day as needed for muscle spasms (Patient not taking: Reported on 10/28/2019)    meloxicam (MOBIC) 15 mg tablet Take 1 tablet (15 mg total) by mouth daily (Patient not taking: Reported on 10/28/2019)    Omeprazole 20 MG TBDD omeprazole 20 mg capsule,delayed release Take 2 capsules as needed by oral route   pyridoxine (VITAMIN B6) 100 mg tablet Daily     No current facility-administered medications on file prior to visit  She is allergic to penicillins       Review of Systems   Constitutional: Positive for fatigue and unexpected weight change  Negative for activity change, appetite change, chills and fever  HENT: Negative for rhinorrhea, sneezing and sore throat  Eyes: Negative for visual disturbance  Respiratory: Negative for cough, shortness of breath and wheezing  Cardiovascular: Negative for chest pain, palpitations and leg swelling  Gastrointestinal: Negative for abdominal distention, constipation, diarrhea, nausea and vomiting  Genitourinary: Positive for menstrual problem  Negative for difficulty urinating  Neurological: Negative for syncope and light-headedness  Objective:      /72 (BP Location: Left arm, Patient Position: Sitting, Cuff Size: Standard)   Ht 5' 3 5" (1 613 m)   Wt 57 7 kg (127 lb 2 oz)   LMP 10/26/2019 (Exact Date)   Breastfeeding? No   BMI 22 17 kg/m²          Physical Exam   Genitourinary: There is no rash, tenderness or lesion on the right labia  There is no rash, tenderness or lesion on the left labia  Uterus is not deviated, not enlarged, not fixed and not tender  Cervix exhibits no motion tenderness, no discharge and no friability  Right adnexum displays no mass, no tenderness and no fullness  Left adnexum displays no mass, no tenderness and no fullness  No erythema, tenderness or bleeding in the vagina  No signs of injury around the vagina  No vaginal discharge found

## 2019-10-28 NOTE — PATIENT INSTRUCTIONS
Menorrhagia   AMBULATORY CARE:   Menorrhagia  is heavy menstrual bleeding for more than 7 days or severe menstrual bleeding for less than 7 days  Your menstrual bleeding and cramping are so heavy that you have trouble doing your usual daily activities  Your monthly period may also occur more often, and you may bleed between periods  Menorrhagia is common in adolescence and around menopause  Common symptoms include the following:   · Soaking a pad or tampon every 1 to 2 hours    · Using both a pad and a tampon    · Waking up at night to change your pad or tampon    · Blood clots with your bleeding for more than 1 day    · Abdominal pain or cramps  Call 911 for any of the following:   · You have chest pain and shortness of breath  · Your heart is fluttering or beating faster than usual for you  Seek care immediately if:   · You feel dizzy when you stand  · You feel confused  · You have severe abdominal pain, nausea, and vomiting  · Your skin or the whites of your eyes turn yellow  Contact your healthcare provider if:   · You need to change your pad or tampon more than 1 time per hour, for several hours in a row  · You feel more weak and tired than usual      · You have new coldness in your hands and feet  · You have questions or concerns about your condition or care  Medicines: You may need any of the following:  · Iron supplements  may be given if your blood iron level decreases because of heavy bleeding  · NSAIDs , such as ibuprofen, treat menstrual cramps  This medicine is available with or without a doctor's order  NSAIDs can cause stomach bleeding or kidney problems in certain people  If you take blood thinner medicine, always ask your healthcare provider if NSAIDs are safe for you  Always read the medicine label and follow directions  · Hormones  help slow or stop your bleeding and make your monthly periods more regular   This medicine may be given as birth control pills or an intrauterine device (IUD)  · Take your medicine as directed  Contact your healthcare provider if you think your medicine is not helping or if you have side effects  Tell him of her if you are allergic to any medicine  Keep a list of the medicines, vitamins, and herbs you take  Include the amounts, and when and why you take them  Bring the list or the pill bottles to follow-up visits  Carry your medicine list with you in case of an emergency  Manage your symptoms:   · Keep a supply of pads or tampons with you at all times  If possible, stay close to a bathroom  · Apply heat  on your abdomen for 20 to 30 minutes every 2 hours for as many days as directed  Heat helps decrease pain and cramps  Follow up with your healthcare provider as directed: You may need regular pelvic exams with Pap smears to monitor your condition  Write down your questions so you remember to ask them during your visits  © 2017 [address] [employer] Information is for End User's use only and may not be sold, redistributed or otherwise used for commercial purposes  All illustrations and images included in CareNotes® are the copyrighted property of [employer]  or Tobias Gamble  The above information is an  only  It is not intended as medical advice for individual conditions or treatments  Talk to your doctor, nurse or pharmacist before following any medical regimen to see if it is safe and effective for you

## 2019-10-28 NOTE — ASSESSMENT & PLAN NOTE
IUD in place (paragard 2016)    Options for treatment and causes reviewed  Change IUD to mirena, add progestin daily to control menses, D&C Ablation    Labs ordered to rule out hormonal causes and ultrasound ordered as well  Will call with results

## 2019-10-29 ENCOUNTER — HOSPITAL ENCOUNTER (OUTPATIENT)
Dept: ULTRASOUND IMAGING | Facility: HOSPITAL | Age: 44
Discharge: HOME | End: 2019-10-29
Attending: OBSTETRICS & GYNECOLOGY
Payer: COMMERCIAL

## 2019-10-29 ENCOUNTER — OFFICE VISIT (OUTPATIENT)
Dept: FAMILY MEDICINE CLINIC | Facility: CLINIC | Age: 44
End: 2019-10-29
Payer: COMMERCIAL

## 2019-10-29 VITALS
WEIGHT: 127 LBS | SYSTOLIC BLOOD PRESSURE: 92 MMHG | OXYGEN SATURATION: 99 % | HEIGHT: 64 IN | RESPIRATION RATE: 16 BRPM | DIASTOLIC BLOOD PRESSURE: 66 MMHG | HEART RATE: 119 BPM | TEMPERATURE: 98.3 F | BODY MASS INDEX: 21.68 KG/M2

## 2019-10-29 DIAGNOSIS — Z12.39 SCREENING FOR BREAST CANCER: ICD-10-CM

## 2019-10-29 DIAGNOSIS — F32.A DEPRESSION, UNSPECIFIED DEPRESSION TYPE: Primary | ICD-10-CM

## 2019-10-29 DIAGNOSIS — N92.1 MENORRHAGIA WITH IRREGULAR CYCLE: ICD-10-CM

## 2019-10-29 DIAGNOSIS — Z23 ENCOUNTER FOR IMMUNIZATION: ICD-10-CM

## 2019-10-29 DIAGNOSIS — M48.02 CERVICAL STENOSIS OF SPINE: ICD-10-CM

## 2019-10-29 DIAGNOSIS — R42 VERTIGO: ICD-10-CM

## 2019-10-29 PROCEDURE — 90686 IIV4 VACC NO PRSV 0.5 ML IM: CPT | Performed by: INTERNAL MEDICINE

## 2019-10-29 PROCEDURE — 76830 TRANSVAGINAL US NON-OB: CPT

## 2019-10-29 PROCEDURE — 90471 IMMUNIZATION ADMIN: CPT | Performed by: INTERNAL MEDICINE

## 2019-10-29 PROCEDURE — 76856 US EXAM PELVIC COMPLETE: CPT

## 2019-10-29 PROCEDURE — 99214 OFFICE O/P EST MOD 30 MIN: CPT | Performed by: INTERNAL MEDICINE

## 2019-10-29 RX ORDER — CYCLOBENZAPRINE HCL 10 MG
10 TABLET ORAL 3 TIMES DAILY PRN
Qty: 15 TABLET | Refills: 1 | Status: SHIPPED | OUTPATIENT
Start: 2019-10-29 | End: 2021-05-19 | Stop reason: ALTCHOICE

## 2019-10-29 RX ORDER — ESCITALOPRAM OXALATE 10 MG/1
10 TABLET ORAL DAILY
Qty: 30 TABLET | Refills: 1 | Status: SHIPPED | OUTPATIENT
Start: 2019-10-29 | End: 2019-12-17 | Stop reason: SDUPTHER

## 2019-10-29 RX ORDER — MECLIZINE HCL 12.5 MG/1
12.5 TABLET ORAL 3 TIMES DAILY PRN
Qty: 30 TABLET | Refills: 0 | Status: SHIPPED | OUTPATIENT
Start: 2019-10-29 | End: 2020-01-14 | Stop reason: ALTCHOICE

## 2019-10-29 NOTE — PROGRESS NOTES
Assessment/Plan:         Diagnoses and all orders for this visit:    Depression, unspecified depression type  Comments:  start lexapro    Orders:  -     escitalopram (LEXAPRO) 10 mg tablet; Take 1 tablet (10 mg total) by mouth daily    Screening for breast cancer  -     Mammo screening bilateral w cad; Future    Encounter for immunization  -     influenza vaccine, [id number], quadrivalent, 0 5 mL, preservative-free, for adult and pediatric patients 6 mos+ (AFLURIA, FLUARIX, FLULAVAL, FLUZONE)    Vertigo  Comments:  prn meclizine  knows not to drive with med  Orders:  -     meclizine (ANTIVERT) 12 5 MG tablet; Take 1 tablet (12 5 mg total) by mouth 3 (three) times a day as needed for dizziness    Cervical stenosis of spine  Comments:  ? relief with sri  Orders:  -     cyclobenzaprine (FLEXERIL) 10 mg tablet; Take 1 tablet (10 mg total) by mouth 3 (three) times a day as needed for muscle spasms          Subjective:      Patient ID: Luis Enrique Alcaraz is a 41 y o  female  Pt states has iud  She feels exhausted  She feels achy  Does not want to do anything  She states her ears bother her when she is around mold  Back and neck pain  She had cramping with her menses last week  Saw gyn ? Change her iud      The following portions of the patient's history were reviewed and updated as appropriate: She  has a past medical history of Internal derangement of knee joint, left and Lyme disease  She   Patient Active Problem List    Diagnosis Date Noted    Menorrhagia with irregular cycle 10/28/2019    Carpal tunnel syndrome of left wrist     Tobacco dependence 02/27/2018    Atypical chest pain 10/04/2017    Decreased libido without sexual dysfunction 10/04/2017    Esophageal reflux 03/04/2014    Migraine headache 05/30/2012     She  has a past surgical history that includes [place] tooth extraction  Her family history includes Heart disease in her maternal grandmother;  Thyroid disease in her maternal grandmother, mother, and sister  She  reports that she has been smoking cigarettes  She has a 10 00 pack-year smoking history  She has never used smokeless tobacco  She reports that she drinks alcohol  She reports that she does not use drugs  Current Outpatient Medications   Medication Sig Dispense Refill    Cholecalciferol (VITAMIN D3) 1000 units CAPS Daily      cyclobenzaprine (FLEXERIL) 10 mg tablet Take 1 tablet (10 mg total) by mouth 3 (three) times a day as needed for muscle spasms 15 tablet 1    norethindrone (MICRONOR) 0 35 MG tablet Take 1 tablet (0 35 mg total) by mouth daily 28 tablet 11    Omeprazole 20 MG TBDD omeprazole 20 mg capsule,delayed release   Take 2 capsules as needed by oral route   PARAGARD INTRAUTERINE COPPER IUD by Intrauterine route      escitalopram (LEXAPRO) 10 mg tablet Take 1 tablet (10 mg total) by mouth daily 30 tablet 1    meclizine (ANTIVERT) 12 5 MG tablet Take 1 tablet (12 5 mg total) by mouth 3 (three) times a day as needed for dizziness 30 tablet 0     No current facility-administered medications for this visit  Current Outpatient Medications on File Prior to Visit   Medication Sig    Cholecalciferol (VITAMIN D3) 1000 units CAPS Daily    norethindrone (MICRONOR) 0 35 MG tablet Take 1 tablet (0 35 mg total) by mouth daily    Omeprazole 20 MG TBDD omeprazole 20 mg capsule,delayed release   Take 2 capsules as needed by oral route   PARAGARD INTRAUTERINE COPPER IUD by Intrauterine route     No current facility-administered medications on file prior to visit  She is allergic to penicillins       Review of Systems   Constitutional: Negative  Negative for fever  HENT: Negative  Respiratory: Negative  Cardiovascular: Negative  Genitourinary: Positive for menstrual problem  Neurological: Positive for dizziness           Objective:      BP 92/66 (BP Location: Right arm, Patient Position: Sitting, Cuff Size: Standard)   Pulse (!) 119   Temp 98 3 °F (36 8 °C) (Tympanic)   Resp 16   Ht 5' 3 5" (1 613 m)   Wt 57 6 kg (127 lb)   LMP 10/26/2019 (Exact Date)   SpO2 99%   BMI 22 14 kg/m²          Physical Exam   Constitutional: She appears well-developed and well-nourished  No distress  HENT:   Head: Normocephalic and atraumatic  Right Ear: External ear normal    Left Ear: External ear normal    Nose: Nose normal    Mouth/Throat: Oropharynx is clear and moist  No oropharyngeal exudate  Neck: Normal range of motion  Neck supple  Cardiovascular: Normal rate, regular rhythm, normal heart sounds and intact distal pulses  Exam reveals no gallop and no friction rub  No murmur heard  Pulmonary/Chest: Effort normal and breath sounds normal  No respiratory distress  She has no wheezes  She has no rales  Neurological:   Horiz   nystagmus   Skin: She is not diaphoretic

## 2019-11-21 ENCOUNTER — TELEPHONE (OUTPATIENT)
Dept: OBGYN CLINIC | Facility: CLINIC | Age: 44
End: 2019-11-21

## 2019-11-21 NOTE — TELEPHONE ENCOUNTER
Started progestin 1 month ago  Has had two menses  No signs of anemia  Labs reviewed  Perimenopause indicated with elevated FSH  At this time options remain the same  OC - either progestin only, combined,  Change IUD to Mirena, ablation  Patient does not want surgery  Does not want JENY due to history of headaches on them  At this time, continue progestin only pill for another month to see if bleeding pattern improves

## 2019-12-17 DIAGNOSIS — F32.A DEPRESSION, UNSPECIFIED DEPRESSION TYPE: ICD-10-CM

## 2019-12-17 RX ORDER — ESCITALOPRAM OXALATE 10 MG/1
TABLET ORAL
Qty: 30 TABLET | Refills: 1 | Status: SHIPPED | OUTPATIENT
Start: 2019-12-17 | End: 2020-01-14 | Stop reason: ALTCHOICE

## 2020-01-14 ENCOUNTER — OFFICE VISIT (OUTPATIENT)
Dept: OBGYN CLINIC | Facility: CLINIC | Age: 45
End: 2020-01-14
Payer: COMMERCIAL

## 2020-01-14 VITALS — WEIGHT: 129 LBS | DIASTOLIC BLOOD PRESSURE: 92 MMHG | BODY MASS INDEX: 22.49 KG/M2 | SYSTOLIC BLOOD PRESSURE: 140 MMHG

## 2020-01-14 DIAGNOSIS — N92.1 MENORRHAGIA WITH IRREGULAR CYCLE: Primary | ICD-10-CM

## 2020-01-14 PROCEDURE — 99213 OFFICE O/P EST LOW 20 MIN: CPT | Performed by: OBSTETRICS & GYNECOLOGY

## 2020-01-14 NOTE — H&P (VIEW-ONLY)
Assessment/Plan:    Menorrhagia with irregular cycle  Plan for [facility]  Hysteroscopy with Novasure ablation, Removal of IUD, Bilateral salpingectomy    Continue progestin for now until procedure  Diagnoses and all orders for this visit:    Menorrhagia with irregular cycle          Subjective:      Patient ID: Juanjose Suarez is a 45 y o  female  Patient presents today to discuss options for irregular menses  Patient reports having 3 cycles in 1 month last month in addition to severe cramping  In November, patient reports having menses from the - and then again from the [address]  In October, patient reports having menses from the - and then again from the [facility] and then again from the -  Patient reports that menses started becoming irregular since 2019  Patient is using ParaGard for birth control since 216      45year old  with persistent irregular bleeding despite hormonal treatments  In the past, JENY have given her headaches and does not want to try this option  Mirena IUD offered and again at this point Sebadebayo Prater would like a more definitive option  Options- D&C hysteroscopy with ablation and then continue progestin for contraception versus adding salpingectomy to procedure  Gynecologic Exam   The patient's primary symptoms include vaginal bleeding  The patient's pertinent negatives include no genital itching, genital lesions, genital odor, genital rash, pelvic pain or vaginal discharge  This is a new problem  The current episode started more than 1 year ago  The problem occurs daily  The problem has been unchanged  The pain is moderate  Pertinent negatives include no chills, constipation, diarrhea, fever, frequency, nausea, painful intercourse, sore throat, urgency or vomiting  The vaginal bleeding is heavier than menses  She has been passing clots  She has not been passing tissue  The symptoms are aggravated by activity and heavy lifting   She has tried acetaminophen, NSAIDs and heating pads for the symptoms  The treatment provided no relief  She is sexually active  Her menstrual history has been irregular  The following portions of the patient's history were reviewed and updated as appropriate:   She  has a past medical history of Internal derangement of knee joint, left and Lyme disease  She   Patient Active Problem List    Diagnosis Date Noted    Menorrhagia with irregular cycle 10/28/2019    Carpal tunnel syndrome of left wrist     Tobacco dependence 02/27/2018    Atypical chest pain 10/04/2017    Decreased libido without sexual dysfunction 10/04/2017    Esophageal reflux 03/04/2014    Migraine headache 05/30/2012     She  has a past surgical history that includes [place] tooth extraction  Her family history includes Heart disease in her maternal grandmother; Thyroid disease in her maternal grandmother, mother, and sister  She  reports that she has been smoking cigarettes  She has a 10 00 pack-year smoking history  She has never used smokeless tobacco  She reports that she drinks alcohol  She reports that she does not use drugs  Current Outpatient Medications   Medication Sig Dispense Refill    Cholecalciferol (VITAMIN D3) 1000 units CAPS Daily      cyclobenzaprine (FLEXERIL) 10 mg tablet Take 1 tablet (10 mg total) by mouth 3 (three) times a day as needed for muscle spasms 15 tablet 1    Omeprazole 20 MG TBDD omeprazole 20 mg capsule,delayed release   Take 2 capsules as needed by oral route   PARAGARD INTRAUTERINE COPPER IUD by Intrauterine route      norethindrone (MICRONOR) 0 35 MG tablet Take 1 tablet (0 35 mg total) by mouth daily (Patient not taking: Reported on 1/14/2020) 28 tablet 11     No current facility-administered medications for this visit        Current Outpatient Medications on File Prior to Visit   Medication Sig    Cholecalciferol (VITAMIN D3) 1000 units CAPS Daily    cyclobenzaprine (FLEXERIL) 10 mg tablet Take 1 tablet (10 mg total) by mouth 3 (three) times a day as needed for muscle spasms    Omeprazole 20 MG TBDD omeprazole 20 mg capsule,delayed release   Take 2 capsules as needed by oral route   PARAGARD INTRAUTERINE COPPER IUD by Intrauterine route    norethindrone (MICRONOR) 0 35 MG tablet Take 1 tablet (0 35 mg total) by mouth daily (Patient not taking: Reported on 1/14/2020)    [DISCONTINUED] escitalopram (LEXAPRO) 10 mg tablet TAKE 1 TABLET BY MOUTH EVERY DAY (Patient not taking: Reported on 1/14/2020)    [DISCONTINUED] meclizine (ANTIVERT) 12 5 MG tablet Take 1 tablet (12 5 mg total) by mouth 3 (three) times a day as needed for dizziness (Patient not taking: Reported on 1/14/2020)     No current facility-administered medications on file prior to visit  She is allergic to penicillins       Review of Systems   Constitutional: Negative for activity change, appetite change, chills, fatigue and fever  HENT: Negative for rhinorrhea, sneezing and sore throat  Eyes: Negative for visual disturbance  Respiratory: Negative for cough, shortness of breath and wheezing  Cardiovascular: Negative for chest pain, palpitations and leg swelling  Gastrointestinal: Negative for abdominal distention, constipation, diarrhea, nausea and vomiting  Genitourinary: Negative for difficulty urinating, frequency, pelvic pain, urgency and vaginal discharge  Neurological: Negative for syncope and light-headedness  Objective:      /92 (BP Location: Right arm, Patient Position: Sitting, Cuff Size: Standard)   Wt 58 5 kg (129 lb)   BMI 22 49 kg/m²          Physical Exam   Constitutional: She is oriented to person, place, and time  She appears well-developed and well-nourished  No distress  Cardiovascular: Normal rate, regular rhythm and normal heart sounds  Exam reveals no gallop and no friction rub  No murmur heard  Pulmonary/Chest: Effort normal and breath sounds normal  No respiratory distress  Abdominal: Soft  Bowel sounds are normal  She exhibits no distension  There is no tenderness  There is no rebound and no guarding  Neurological: She is alert and oriented to person, place, and time  She has normal reflexes  Skin: She is not diaphoretic

## 2020-01-14 NOTE — PROGRESS NOTES
Assessment/Plan:    Menorrhagia with irregular cycle  Plan for [facility]  Hysteroscopy with Novasure ablation, Removal of IUD, Bilateral salpingectomy    Continue progestin for now until procedure  Diagnoses and all orders for this visit:    Menorrhagia with irregular cycle          Subjective:      Patient ID: Halle Hayes is a 45 y o  female  Patient presents today to discuss options for irregular menses  Patient reports having 3 cycles in 1 month last month in addition to severe cramping  In November, patient reports having menses from the - and then again from the [address]  In October, patient reports having menses from the - and then again from the [facility] and then again from the -  Patient reports that menses started becoming irregular since 2019  Patient is using ParaGard for birth control since 216      45year old  with persistent irregular bleeding despite hormonal treatments  In the past, JENY have given her headaches and does not want to try this option  Mirena IUD offered and again at this point Zoey Cole would like a more definitive option  Options- D&C hysteroscopy with ablation and then continue progestin for contraception versus adding salpingectomy to procedure  Gynecologic Exam   The patient's primary symptoms include vaginal bleeding  The patient's pertinent negatives include no genital itching, genital lesions, genital odor, genital rash, pelvic pain or vaginal discharge  This is a new problem  The current episode started more than 1 year ago  The problem occurs daily  The problem has been unchanged  The pain is moderate  Pertinent negatives include no chills, constipation, diarrhea, fever, frequency, nausea, painful intercourse, sore throat, urgency or vomiting  The vaginal bleeding is heavier than menses  She has been passing clots  She has not been passing tissue  The symptoms are aggravated by activity and heavy lifting   She has tried acetaminophen, NSAIDs and heating pads for the symptoms  The treatment provided no relief  She is sexually active  Her menstrual history has been irregular  The following portions of the patient's history were reviewed and updated as appropriate:   She  has a past medical history of Internal derangement of knee joint, left and Lyme disease  She   Patient Active Problem List    Diagnosis Date Noted    Menorrhagia with irregular cycle 10/28/2019    Carpal tunnel syndrome of left wrist     Tobacco dependence 02/27/2018    Atypical chest pain 10/04/2017    Decreased libido without sexual dysfunction 10/04/2017    Esophageal reflux 03/04/2014    Migraine headache 05/30/2012     She  has a past surgical history that includes [place] tooth extraction  Her family history includes Heart disease in her maternal grandmother; Thyroid disease in her maternal grandmother, mother, and sister  She  reports that she has been smoking cigarettes  She has a 10 00 pack-year smoking history  She has never used smokeless tobacco  She reports that she drinks alcohol  She reports that she does not use drugs  Current Outpatient Medications   Medication Sig Dispense Refill    Cholecalciferol (VITAMIN D3) 1000 units CAPS Daily      cyclobenzaprine (FLEXERIL) 10 mg tablet Take 1 tablet (10 mg total) by mouth 3 (three) times a day as needed for muscle spasms 15 tablet 1    Omeprazole 20 MG TBDD omeprazole 20 mg capsule,delayed release   Take 2 capsules as needed by oral route   PARAGARD INTRAUTERINE COPPER IUD by Intrauterine route      norethindrone (MICRONOR) 0 35 MG tablet Take 1 tablet (0 35 mg total) by mouth daily (Patient not taking: Reported on 1/14/2020) 28 tablet 11     No current facility-administered medications for this visit        Current Outpatient Medications on File Prior to Visit   Medication Sig    Cholecalciferol (VITAMIN D3) 1000 units CAPS Daily    cyclobenzaprine (FLEXERIL) 10 mg tablet Take 1 tablet (10 mg total) by mouth 3 (three) times a day as needed for muscle spasms    Omeprazole 20 MG TBDD omeprazole 20 mg capsule,delayed release   Take 2 capsules as needed by oral route   PARAGARD INTRAUTERINE COPPER IUD by Intrauterine route    norethindrone (MICRONOR) 0 35 MG tablet Take 1 tablet (0 35 mg total) by mouth daily (Patient not taking: Reported on 1/14/2020)    [DISCONTINUED] escitalopram (LEXAPRO) 10 mg tablet TAKE 1 TABLET BY MOUTH EVERY DAY (Patient not taking: Reported on 1/14/2020)    [DISCONTINUED] meclizine (ANTIVERT) 12 5 MG tablet Take 1 tablet (12 5 mg total) by mouth 3 (three) times a day as needed for dizziness (Patient not taking: Reported on 1/14/2020)     No current facility-administered medications on file prior to visit  She is allergic to penicillins       Review of Systems   Constitutional: Negative for activity change, appetite change, chills, fatigue and fever  HENT: Negative for rhinorrhea, sneezing and sore throat  Eyes: Negative for visual disturbance  Respiratory: Negative for cough, shortness of breath and wheezing  Cardiovascular: Negative for chest pain, palpitations and leg swelling  Gastrointestinal: Negative for abdominal distention, constipation, diarrhea, nausea and vomiting  Genitourinary: Negative for difficulty urinating, frequency, pelvic pain, urgency and vaginal discharge  Neurological: Negative for syncope and light-headedness  Objective:      /92 (BP Location: Right arm, Patient Position: Sitting, Cuff Size: Standard)   Wt 58 5 kg (129 lb)   BMI 22 49 kg/m²          Physical Exam   Constitutional: She is oriented to person, place, and time  She appears well-developed and well-nourished  No distress  Cardiovascular: Normal rate, regular rhythm and normal heart sounds  Exam reveals no gallop and no friction rub  No murmur heard  Pulmonary/Chest: Effort normal and breath sounds normal  No respiratory distress  Abdominal: Soft  Bowel sounds are normal  She exhibits no distension  There is no tenderness  There is no rebound and no guarding  Neurological: She is alert and oriented to person, place, and time  She has normal reflexes  Skin: She is not diaphoretic

## 2020-01-14 NOTE — PATIENT INSTRUCTIONS
Endometrial Ablation   WHAT YOU NEED TO KNOW:   What do I need to know about endometrial ablation? Endometrial ablation is a procedure to destroy the endometrium (lining of your uterus)  You may need this procedure if you have heavy or abnormal vaginal bleeding  How do I prepare for the procedure? Your healthcare provider will talk to you about how to prepare for the procedure  You may be told not to eat or drink anything after midnight on the day of your procedure  You will also be told what medicines to take or not take on the day of your procedure  You may need someone to drive you home after the procedure and stay with you to make sure you are okay  What will happen during the procedure? You may be given local anesthesia to numb the area  You may instead be given general anesthesia to keep you asleep and free from pain during the procedure  Your surgeon will widen the opening of your cervix with medicine or medical tools  Ice, heated fluid, or electric energy may be used to destroy the lining of your uterus  What should I expect after the procedure? You may feel some discomfort for a few days  This is normal and should stop soon  Contact your healthcare provider if any of the following becomes severe or continues:  · Cramps, similar to menstrual cramps, for 1 to 2 days    · Watery, bloody discharge for 2 to 3 days that may become light and last a few weeks    · Frequent urination for 24 hours    · Nausea  What are the risks of endometrial ablation? You may not be able to get pregnant after endometrial ablation  You may bleed more than expected or get an infection in your vagina, urinary tract, or uterus  Your cervix, uterus, or nearby organs may be burned or damaged  You may get a blood clot in your leg or arm  A blockage may form over months to years and cause blood to pool inside your uterus  This blockage may cause severe pain and you may need a hysterectomy   You may also need a hysterectomy if endometrial ablation does not work  CARE AGREEMENT:   You have the right to help plan your care  Learn about your health condition and how it may be treated  Discuss treatment options with your caregivers to decide what care you want to receive  You always have the right to refuse treatment  The above information is an  only  It is not intended as medical advice for individual conditions or treatments  Talk to your doctor, nurse or pharmacist before following any medical regimen to see if it is safe and effective for you  © 2017 [facility] [facility] Information is for End User's use only and may not be sold, redistributed or otherwise used for commercial purposes  All illustrations and images included in CareNotes® are the copyrighted property of [employer]  or Tobias Gamble

## 2020-01-14 NOTE — ASSESSMENT & PLAN NOTE
Plan for [facility]  Hysteroscopy with Novasure ablation, Removal of IUD, Bilateral salpingectomy    Continue progestin for now until procedure

## 2020-01-16 PROBLEM — Z30.2 ENCOUNTER FOR STERILIZATION: Status: ACTIVE | Noted: 2020-01-16

## 2020-01-16 PROBLEM — N92.0 MENORRHAGIA WITH REGULAR CYCLE: Status: ACTIVE | Noted: 2020-01-16

## 2020-01-22 RX ORDER — MULTIVITAMIN
1 TABLET ORAL DAILY
COMMUNITY

## 2020-01-22 NOTE — PRE-PROCEDURE INSTRUCTIONS
Pre-Surgery Instructions:   Medication Instructions    5-Hydroxytryptophan (5-HTP PO) Instructed patient per Anesthesia Guidelines   Cholecalciferol (VITAMIN D3) 1000 units CAPS Instructed patient per Anesthesia Guidelines   cyclobenzaprine (FLEXERIL) 10 mg tablet Instructed patient per Anesthesia Guidelines   Multiple Vitamin (MULTIVITAMIN) tablet Instructed patient per Anesthesia Guidelines   norethindrone (MICRONOR) 0 35 MG tablet Instructed patient per Anesthesia Guidelines   Omeprazole 20 MG TBDD Instructed patient per Anesthesia Guidelines   PARAGARD INTRAUTERINE COPPER IUD Instructed patient per Anesthesia Guidelines  Review with patient via phone medications and showering instructions  Advice stop vitamins,suplements, don't take NSAID'S week prior day of surgery  Ok to take tylenol products  Advice ASC call and [facility] phone number  Verbalized understanding  Herbal Med Class   Stop taking this herbal medications at least one week prior to surgery/procedure  Vitamin Med Class   You may continue to take any vitamin that your surgeon has prescribed to you up to the day before surgery   If your surgeon has not specifically prescribed this vitamin or instructed you to continue then stop taking 7 days prior to surgery

## 2020-01-31 ENCOUNTER — ANESTHESIA EVENT (OUTPATIENT)
Dept: PERIOP | Facility: HOSPITAL | Age: 45
End: 2020-01-31
Payer: COMMERCIAL

## 2020-01-31 ENCOUNTER — HOSPITAL ENCOUNTER (OUTPATIENT)
Facility: HOSPITAL | Age: 45
Setting detail: OUTPATIENT SURGERY
Discharge: HOME | End: 2020-01-31
Attending: OBSTETRICS & GYNECOLOGY | Admitting: OBSTETRICS & GYNECOLOGY
Payer: COMMERCIAL

## 2020-01-31 ENCOUNTER — ANESTHESIA (OUTPATIENT)
Dept: PERIOP | Facility: HOSPITAL | Age: 45
End: 2020-01-31
Payer: COMMERCIAL

## 2020-01-31 VITALS
TEMPERATURE: 99.7 F | SYSTOLIC BLOOD PRESSURE: 103 MMHG | HEART RATE: 82 BPM | RESPIRATION RATE: 16 BRPM | OXYGEN SATURATION: 100 % | DIASTOLIC BLOOD PRESSURE: 72 MMHG

## 2020-01-31 DIAGNOSIS — N92.0 MENORRHAGIA WITH REGULAR CYCLE: ICD-10-CM

## 2020-01-31 DIAGNOSIS — Z30.2 ENCOUNTER FOR STERILIZATION: ICD-10-CM

## 2020-01-31 DIAGNOSIS — G89.18 POST-OP PAIN: Primary | ICD-10-CM

## 2020-01-31 PROCEDURE — 58661 LAPAROSCOPY REMOVE ADNEXA: CPT | Performed by: OBSTETRICS & GYNECOLOGY

## 2020-01-31 PROCEDURE — 58563 HYSTEROSCOPY ABLATION: CPT | Performed by: OBSTETRICS & GYNECOLOGY

## 2020-01-31 PROCEDURE — 88305 TISSUE EXAM BY PATHOLOGIST: CPT | Performed by: PATHOLOGY

## 2020-01-31 PROCEDURE — 88344 IMHCHEM/IMCYTCHM EA MLT ANTB: CPT | Performed by: PATHOLOGY

## 2020-01-31 PROCEDURE — 88302 TISSUE EXAM BY PATHOLOGIST: CPT | Performed by: PATHOLOGY

## 2020-01-31 PROCEDURE — 88342 IMHCHEM/IMCYTCHM 1ST ANTB: CPT | Performed by: PATHOLOGY

## 2020-01-31 RX ORDER — NEOSTIGMINE METHYLSULFATE 1 MG/ML
INJECTION INTRAVENOUS AS NEEDED
Status: DISCONTINUED | OUTPATIENT
Start: 2020-01-31 | End: 2020-01-31 | Stop reason: SURG

## 2020-01-31 RX ORDER — LIDOCAINE HYDROCHLORIDE 10 MG/ML
INJECTION, SOLUTION EPIDURAL; INFILTRATION; INTRACAUDAL; PERINEURAL AS NEEDED
Status: DISCONTINUED | OUTPATIENT
Start: 2020-01-31 | End: 2020-01-31 | Stop reason: SURG

## 2020-01-31 RX ORDER — GLYCOPYRROLATE 0.2 MG/ML
INJECTION INTRAMUSCULAR; INTRAVENOUS AS NEEDED
Status: DISCONTINUED | OUTPATIENT
Start: 2020-01-31 | End: 2020-01-31 | Stop reason: SURG

## 2020-01-31 RX ORDER — METOCLOPRAMIDE HYDROCHLORIDE 5 MG/ML
5 INJECTION INTRAMUSCULAR; INTRAVENOUS ONCE AS NEEDED
Status: DISCONTINUED | OUTPATIENT
Start: 2020-01-31 | End: 2020-01-31 | Stop reason: HOSPADM

## 2020-01-31 RX ORDER — METOCLOPRAMIDE HYDROCHLORIDE 5 MG/ML
INJECTION INTRAMUSCULAR; INTRAVENOUS AS NEEDED
Status: DISCONTINUED | OUTPATIENT
Start: 2020-01-31 | End: 2020-01-31 | Stop reason: SURG

## 2020-01-31 RX ORDER — ONDANSETRON 2 MG/ML
4 INJECTION INTRAMUSCULAR; INTRAVENOUS ONCE AS NEEDED
Status: DISCONTINUED | OUTPATIENT
Start: 2020-01-31 | End: 2020-01-31 | Stop reason: HOSPADM

## 2020-01-31 RX ORDER — IBUPROFEN 600 MG/1
600 TABLET ORAL EVERY 6 HOURS PRN
Status: DISCONTINUED | OUTPATIENT
Start: 2020-01-31 | End: 2020-01-31 | Stop reason: HOSPADM

## 2020-01-31 RX ORDER — ACETAMINOPHEN 325 MG/1
650 TABLET ORAL EVERY 6 HOURS PRN
Status: DISCONTINUED | OUTPATIENT
Start: 2020-01-31 | End: 2020-01-31 | Stop reason: HOSPADM

## 2020-01-31 RX ORDER — ROCURONIUM BROMIDE 10 MG/ML
INJECTION, SOLUTION INTRAVENOUS AS NEEDED
Status: DISCONTINUED | OUTPATIENT
Start: 2020-01-31 | End: 2020-01-31 | Stop reason: SURG

## 2020-01-31 RX ORDER — LIDOCAINE HYDROCHLORIDE 10 MG/ML
0.5 INJECTION, SOLUTION EPIDURAL; INFILTRATION; INTRACAUDAL; PERINEURAL ONCE AS NEEDED
Status: DISCONTINUED | OUTPATIENT
Start: 2020-01-31 | End: 2020-01-31 | Stop reason: HOSPADM

## 2020-01-31 RX ORDER — MAGNESIUM HYDROXIDE 1200 MG/15ML
LIQUID ORAL AS NEEDED
Status: DISCONTINUED | OUTPATIENT
Start: 2020-01-31 | End: 2020-01-31 | Stop reason: HOSPADM

## 2020-01-31 RX ORDER — IBUPROFEN 600 MG/1
600 TABLET ORAL EVERY 6 HOURS PRN
Qty: 30 TABLET | Refills: 0 | Status: SHIPPED | OUTPATIENT
Start: 2020-01-31

## 2020-01-31 RX ORDER — OXYCODONE HYDROCHLORIDE 5 MG/1
5 TABLET ORAL EVERY 4 HOURS PRN
Qty: 5 TABLET | Refills: 0 | Status: SHIPPED | OUTPATIENT
Start: 2020-01-31 | End: 2020-02-10

## 2020-01-31 RX ORDER — OXYCODONE HYDROCHLORIDE 5 MG/1
5 TABLET ORAL EVERY 4 HOURS PRN
Status: CANCELLED | OUTPATIENT
Start: 2020-01-31

## 2020-01-31 RX ORDER — ONDANSETRON 2 MG/ML
4 INJECTION INTRAMUSCULAR; INTRAVENOUS ONCE AS NEEDED
Status: DISCONTINUED | OUTPATIENT
Start: 2020-01-31 | End: 2020-01-31

## 2020-01-31 RX ORDER — SUCCINYLCHOLINE/SOD CL,ISO/PF 100 MG/5ML
SYRINGE (ML) INTRAVENOUS AS NEEDED
Status: DISCONTINUED | OUTPATIENT
Start: 2020-01-31 | End: 2020-01-31 | Stop reason: SURG

## 2020-01-31 RX ORDER — KETOROLAC TROMETHAMINE 30 MG/ML
INJECTION, SOLUTION INTRAMUSCULAR; INTRAVENOUS AS NEEDED
Status: DISCONTINUED | OUTPATIENT
Start: 2020-01-31 | End: 2020-01-31 | Stop reason: SURG

## 2020-01-31 RX ORDER — ONDANSETRON 2 MG/ML
4 INJECTION INTRAMUSCULAR; INTRAVENOUS EVERY 6 HOURS PRN
Status: DISCONTINUED | OUTPATIENT
Start: 2020-01-31 | End: 2020-01-31 | Stop reason: HOSPADM

## 2020-01-31 RX ORDER — PROPOFOL 10 MG/ML
INJECTION, EMULSION INTRAVENOUS CONTINUOUS PRN
Status: DISCONTINUED | OUTPATIENT
Start: 2020-01-31 | End: 2020-01-31 | Stop reason: SURG

## 2020-01-31 RX ORDER — MIDAZOLAM HYDROCHLORIDE 2 MG/2ML
INJECTION, SOLUTION INTRAMUSCULAR; INTRAVENOUS AS NEEDED
Status: DISCONTINUED | OUTPATIENT
Start: 2020-01-31 | End: 2020-01-31 | Stop reason: SURG

## 2020-01-31 RX ORDER — ALBUTEROL SULFATE 2.5 MG/3ML
2.5 SOLUTION RESPIRATORY (INHALATION) ONCE AS NEEDED
Status: DISCONTINUED | OUTPATIENT
Start: 2020-01-31 | End: 2020-01-31 | Stop reason: HOSPADM

## 2020-01-31 RX ORDER — PROMETHAZINE HYDROCHLORIDE 25 MG/ML
12.5 INJECTION, SOLUTION INTRAMUSCULAR; INTRAVENOUS ONCE AS NEEDED
Status: DISCONTINUED | OUTPATIENT
Start: 2020-01-31 | End: 2020-01-31 | Stop reason: HOSPADM

## 2020-01-31 RX ORDER — FENTANYL CITRATE/PF 50 MCG/ML
25 SYRINGE (ML) INJECTION
Status: COMPLETED | OUTPATIENT
Start: 2020-01-31 | End: 2020-01-31

## 2020-01-31 RX ORDER — PROPOFOL 10 MG/ML
INJECTION, EMULSION INTRAVENOUS AS NEEDED
Status: DISCONTINUED | OUTPATIENT
Start: 2020-01-31 | End: 2020-01-31 | Stop reason: SURG

## 2020-01-31 RX ORDER — ONDANSETRON 2 MG/ML
INJECTION INTRAMUSCULAR; INTRAVENOUS AS NEEDED
Status: DISCONTINUED | OUTPATIENT
Start: 2020-01-31 | End: 2020-01-31 | Stop reason: SURG

## 2020-01-31 RX ORDER — FENTANYL CITRATE 50 UG/ML
INJECTION, SOLUTION INTRAMUSCULAR; INTRAVENOUS AS NEEDED
Status: DISCONTINUED | OUTPATIENT
Start: 2020-01-31 | End: 2020-01-31 | Stop reason: SURG

## 2020-01-31 RX ORDER — SODIUM CHLORIDE, SODIUM LACTATE, POTASSIUM CHLORIDE, CALCIUM CHLORIDE 600; 310; 30; 20 MG/100ML; MG/100ML; MG/100ML; MG/100ML
INJECTION, SOLUTION INTRAVENOUS CONTINUOUS PRN
Status: DISCONTINUED | OUTPATIENT
Start: 2020-01-31 | End: 2020-01-31 | Stop reason: SURG

## 2020-01-31 RX ORDER — SODIUM CHLORIDE, SODIUM LACTATE, POTASSIUM CHLORIDE, CALCIUM CHLORIDE 600; 310; 30; 20 MG/100ML; MG/100ML; MG/100ML; MG/100ML
100 INJECTION, SOLUTION INTRAVENOUS CONTINUOUS
Status: DISCONTINUED | OUTPATIENT
Start: 2020-01-31 | End: 2020-01-31 | Stop reason: HOSPADM

## 2020-01-31 RX ORDER — LIDOCAINE HYDROCHLORIDE 10 MG/ML
INJECTION, SOLUTION EPIDURAL; INFILTRATION; INTRACAUDAL; PERINEURAL AS NEEDED
Status: DISCONTINUED | OUTPATIENT
Start: 2020-01-31 | End: 2020-01-31 | Stop reason: HOSPADM

## 2020-01-31 RX ORDER — HYDROMORPHONE HCL/PF 1 MG/ML
0.2 SYRINGE (ML) INJECTION
Status: DISCONTINUED | OUTPATIENT
Start: 2020-01-31 | End: 2020-01-31 | Stop reason: HOSPADM

## 2020-01-31 RX ORDER — DEXAMETHASONE SODIUM PHOSPHATE 10 MG/ML
INJECTION, SOLUTION INTRAMUSCULAR; INTRAVENOUS AS NEEDED
Status: DISCONTINUED | OUTPATIENT
Start: 2020-01-31 | End: 2020-01-31 | Stop reason: SURG

## 2020-01-31 RX ORDER — SCOLOPAMINE TRANSDERMAL SYSTEM 1 MG/1
1 PATCH, EXTENDED RELEASE TRANSDERMAL
Status: DISCONTINUED | OUTPATIENT
Start: 2020-01-31 | End: 2020-01-31 | Stop reason: HOSPADM

## 2020-01-31 RX ORDER — SODIUM CHLORIDE, SODIUM LACTATE, POTASSIUM CHLORIDE, CALCIUM CHLORIDE 600; 310; 30; 20 MG/100ML; MG/100ML; MG/100ML; MG/100ML
75 INJECTION, SOLUTION INTRAVENOUS CONTINUOUS
Status: DISCONTINUED | OUTPATIENT
Start: 2020-01-31 | End: 2020-01-31 | Stop reason: HOSPADM

## 2020-01-31 RX ORDER — OXYCODONE HYDROCHLORIDE 5 MG/1
5 TABLET ORAL EVERY 4 HOURS PRN
Status: DISCONTINUED | OUTPATIENT
Start: 2020-01-31 | End: 2020-01-31 | Stop reason: HOSPADM

## 2020-01-31 RX ORDER — DIPHENHYDRAMINE HYDROCHLORIDE 50 MG/ML
12.5 INJECTION INTRAMUSCULAR; INTRAVENOUS ONCE AS NEEDED
Status: DISCONTINUED | OUTPATIENT
Start: 2020-01-31 | End: 2020-01-31 | Stop reason: HOSPADM

## 2020-01-31 RX ADMIN — OXYCODONE HYDROCHLORIDE 5 MG: 5 TABLET ORAL at 12:45

## 2020-01-31 RX ADMIN — FENTANYL CITRATE 50 MCG: 50 INJECTION, SOLUTION INTRAMUSCULAR; INTRAVENOUS at 10:02

## 2020-01-31 RX ADMIN — PROPOFOL 200 MG: 10 INJECTION, EMULSION INTRAVENOUS at 09:48

## 2020-01-31 RX ADMIN — DEXAMETHASONE SODIUM PHOSPHATE 10 MG: 10 INJECTION, SOLUTION INTRAMUSCULAR; INTRAVENOUS at 09:48

## 2020-01-31 RX ADMIN — NEOSTIGMINE METHYLSULFATE 3 MG: 1 INJECTION INTRAVENOUS at 10:24

## 2020-01-31 RX ADMIN — PROPOFOL 150 MCG/KG/MIN: 10 INJECTION, EMULSION INTRAVENOUS at 09:56

## 2020-01-31 RX ADMIN — KETOROLAC TROMETHAMINE 30 MG: 30 INJECTION, SOLUTION INTRAMUSCULAR at 10:26

## 2020-01-31 RX ADMIN — Medication 100 MG: at 09:48

## 2020-01-31 RX ADMIN — PHENYLEPHRINE HYDROCHLORIDE 100 MCG: 10 INJECTION INTRAVENOUS at 10:35

## 2020-01-31 RX ADMIN — SCOPALAMINE 1 PATCH: 1 PATCH, EXTENDED RELEASE TRANSDERMAL at 09:33

## 2020-01-31 RX ADMIN — FENTANYL CITRATE 25 MCG: 50 INJECTION, SOLUTION INTRAMUSCULAR; INTRAVENOUS at 11:26

## 2020-01-31 RX ADMIN — SODIUM CHLORIDE, SODIUM LACTATE, POTASSIUM CHLORIDE, AND CALCIUM CHLORIDE: .6; .31; .03; .02 INJECTION, SOLUTION INTRAVENOUS at 09:20

## 2020-01-31 RX ADMIN — ONDANSETRON 4 MG: 2 INJECTION INTRAMUSCULAR; INTRAVENOUS at 10:28

## 2020-01-31 RX ADMIN — FENTANYL CITRATE 25 MCG: 50 INJECTION, SOLUTION INTRAMUSCULAR; INTRAVENOUS at 11:05

## 2020-01-31 RX ADMIN — METOCLOPRAMIDE 5 MG: 5 INJECTION, SOLUTION INTRAMUSCULAR; INTRAVENOUS at 09:48

## 2020-01-31 RX ADMIN — FENTANYL CITRATE 25 MCG: 50 INJECTION, SOLUTION INTRAMUSCULAR; INTRAVENOUS at 11:50

## 2020-01-31 RX ADMIN — PHENYLEPHRINE HYDROCHLORIDE 50 MCG: 10 INJECTION INTRAVENOUS at 10:30

## 2020-01-31 RX ADMIN — PHENYLEPHRINE HYDROCHLORIDE 50 MCG: 10 INJECTION INTRAVENOUS at 10:02

## 2020-01-31 RX ADMIN — GLYCOPYRROLATE 0.4 MG: 0.2 INJECTION, SOLUTION INTRAMUSCULAR; INTRAVENOUS at 10:24

## 2020-01-31 RX ADMIN — ROCURONIUM BROMIDE 30 MG: 50 INJECTION, SOLUTION INTRAVENOUS at 10:02

## 2020-01-31 RX ADMIN — PROMETHAZINE HYDROCHLORIDE 12.5 MG: 25 INJECTION INTRAMUSCULAR; INTRAVENOUS at 10:54

## 2020-01-31 RX ADMIN — FENTANYL CITRATE 50 MCG: 50 INJECTION, SOLUTION INTRAMUSCULAR; INTRAVENOUS at 09:48

## 2020-01-31 RX ADMIN — MIDAZOLAM 2 MG: 1 INJECTION INTRAMUSCULAR; INTRAVENOUS at 09:40

## 2020-01-31 RX ADMIN — FENTANYL CITRATE 25 MCG: 50 INJECTION, SOLUTION INTRAMUSCULAR; INTRAVENOUS at 11:21

## 2020-01-31 RX ADMIN — LIDOCAINE HYDROCHLORIDE 50 MG: 10 INJECTION, SOLUTION EPIDURAL; INFILTRATION; INTRACAUDAL; PERINEURAL at 09:48

## 2020-01-31 NOTE — INTERVAL H&P NOTE
H&P reviewed  After examining the patient I find no changes in the patients condition since the H&P had been written      Vitals:    01/31/20 0857   BP: 108/66   Pulse: 72   Resp: 16   Temp: 99 1 °F (37 3 °C)   SpO2: 99%

## 2020-01-31 NOTE — ANESTHESIA POSTPROCEDURE EVALUATION
Post-Op Assessment Note    CV Status:  Stable  Pain Score: 6    Pain management: adequate     Mental Status:  Alert and awake   Hydration Status:  Euvolemic and stable   PONV Controlled:  Controlled (ongoing TX  history of nausea and motion sickness  discussed wtih patient prior to procedure that she may experience nausea postop d/t nature of procedure )   Airway Patency:  Patent   Post Op Vitals Reviewed: Yes      Staff: Anesthesiologist, CRNA   Comments:  history of nausea and motion sickness  discussed wtih patient prior to procedure that she may experience nausea postop d/t nature of procedure            BP 94/57   Temp      Pulse 70 (01/31/20 1100)71   Resp 15   SpO2 99 % (01/31/20 1100)

## 2020-01-31 NOTE — PROGRESS NOTES
Tolerated light snack PO  Medicated with Roxicodone 5 mg PO at 1245 hours for c/o pain  Pt's  Shanique Self and pt's mother Kandi Lo at bedside

## 2020-01-31 NOTE — OP NOTE
OPERATIVE REPORT  PATIENT NAME: Carlo Young    :  1975  MRN: [id number]  Pt Location: [facility] OR ROOM 03    SURGERY DATE: 2020    Surgeon(s) and Role:     * Rashida Cardenas MD - Primary     * Harriett Artis MD - Assisting    Preop Diagnosis:  Menorrhagia with regular cycle [N92 0]  Encounter for sterilization [Z30 2]    Post-Op Diagnosis Codes:     * Menorrhagia with regular cycle [N92 0]     * Encounter for sterilization [Z30 2]    Procedure(s) (LRB):  DILATATION AND CURETTAGE (D&C) WITH HYSTEROSCOPY (N/A)  ABLATION ENDOMETRIAL NOVASURE (N/A)  REMOVAL OF INTRAUTERINE DEVICE (IUD) (N/A)  SALPINGECTOMY, LAPAROSCOPIC BILATERAL (Bilateral)    Specimen(s):  ID Type Source Tests Collected by Time Destination   1 :  Tissue Fallopian Tubes, Bilateral TISSUE EXAM Rashida Cardenas MD 2020 1021    2 : [facility] Tissue Endometrium TISSUE EXAM Rashida Cardenas MD 2020 1036        Estimated Blood Loss:   Minimal    Drains:  * No LDAs found *    Anesthesia Type:   General endotracheal    Operative Indications:  Menorrhagia with regular cycle [N92 0]  Encounter for sterilization [Z30 2]      Operative Findings:  1  External genitalia WNL no mass and lesions appreciated  2  Uterus normal in size no mass and lesion appreciated  B/L tubes WNL, on right fallopian tube a was 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   Bilateral simple cyst appreciated in both ovaries  3  No parametrial, physical uterine and rectal uterine adhesions  No endometriotic focuses observed  4  Grossly bowel and omentum within normal limits    Complications:   None    Procedure and Technique:  Brief History    All risks, benefits, and alternatives to the procedure were discussed with the patient and she had the opportunity to ask questions  The risk of regret of procedure was also addressed with the patient and options for LARC was discussed, patient is on ParaGard IUD  Patient expressed desire to continue with tubal sterilization   Informed consent was obtained  Description of Procedure    Patient was taken to the operating room  General endotracheal anesthesia (GET) was administered and the patient was positioned on the OR table in the dorsal lithotomy position  All pressure points were padded and a prasad hugger was placed to maintain control of core body temperature  A bimanual exam was performed and the uterus was noted to be anteverted, normal in size and consistency with no palpable adnexal masses or fullness  The patient was prepped and draped in the usual sterile fashion with chloroprep on the abdomen and betadine prep on the vagina and perineum  Operative Technique    A time out was performed to confirm correct patient and correct procedure  A straight catheter was introduced into the bladder, which was drained of 50cc of clear yellow urine  A Carver and Mary Grace retractor inserted to vagina, cervix visualized, the ParaGard strings observed, String held by a ring forceps and removed from uterine cavity intact as shape of letter T  A sponge with ring forceps inserted to vagina for cervix and uterine manipulation  Sterile gloves were then exchanged and attention was turned to the abdomen  A 5mm incision was made at the inferior edge of the umbilicus for introduction of a 5mm trocar  Trocar was introduced under direct visualization  Pneumoperitoneum was then established to a maximum of 15mmHg  The entire abdomen and pelvis was inspected and there was no evidence of injury to bowel, bladder, vasculature, or other structures  Attention was then turned to the pelvis  Patient was placed in Trendelenburg and the uterus was elevated to visualize the fallopian tubes  The findings noted as above  Two additional port sites were selected in the left and right lower abdomen approximately 2cm superior and medial to the iliac crests  A 5mm incision was made for introduction of a 5mm trocar under direct visualization at each site       The left fallopian tube was grasped at its fimbriated end with a blunt grasper and elevated to visualize the mesosalpinx  The Harmonic device was used to ligate along the mesosalpinx, working proximally and taking care to avoid ovarian vasculature  Approximately 2cm from the cornua, the Harmonic was used to amputate fallopian tube  This was then withdrawn from the abdominal cavity and sent for pathology  Attention was then turned to the contralateral tube, which was amputated in similar fashion  Good hemostasis was confirmed following salpingectomy  Following salpingectomy, pneumoperitoneum was allowed to escape  Adequate hemostasis was visualized  The inferior trocars were removed under direct visualization  The laparoscope was withdrawn from the abdomen, followed by its trocar sleeve at the umbilicus  Skin incisions were closed with surgical glue  Exofine  Attention was turned to the vagina  A Carver retractor was inserted into the vagina to visualize the anterior lip of the cervix, which was then grasped with a single toothed tenaculum  The uterus was sounded to 8cm  The cervix was serially dilated to 15 using Lee dilators for introduction of the hysteroscope  Uterus sounded in midposition fashion to 8 cm  The endocervix was dilated to accommodate the 5 mm operating hysteroscope  The diagnostic hysteroscope was then introduced with saline as a distention medium  Excellent visualization of the endocervix and endometrial cavity was accomplished         The endocervix was further dilated to accommodate a medium sharp curette and endometrial curettage was then undertaken with specimen being sent for routine pathology  The NovaSure ablation device was inserted through the cervix and seated into the endometrial cavity  Device was deployed and rotated in all directions to maximize expansion of the bipolar electrode  Uterine length was determined to be 4 5 cm and uterine width was determined to be 3 0 cm   A test of the system was performed and the uterine cavity was insufflated with CO2 to ensure cavity integrity and proper placement of the device  No leakage of gas was appreciated  After successful testing, the Novasure system was activated and bipolar cauterization with a Moisture Transport Vacuum System facilitated ablation of the endometrium in approximately 86 seconds under 74 bella of power  The Novasure system was completely retracted prior to it's removal from the uterine cavity  Inspection of the bipolar electrode showed evidence of burnt endometrial tissue  The tenaculum was removed  The patient had excellent hemostasis at this time  She was cleansed and was awakened from anesthesia without incident and was taken to the recovery room in satisfactory condition  At the conclusion of the procedure, all needle, sponge, and instrument counts were noted to be correct x2  Patient tolerated the procedure well and was transferred to PACU in stable condition prior to discharge with follow up in 1-2 weeks  Dr Betsey Allen was present and participated in all key portions of the case       I was present for the entire procedure    Patient Disposition:  PACU  and extubated and stable    SIGNATURE: Mac Ulloa MD  DATE: January 31, 2020  TIME: 10:55 AM

## 2020-01-31 NOTE — ANESTHESIA PREPROCEDURE EVALUATION
Review of Systems/Medical History  Patient summary reviewed  Chart reviewed  No history of anesthetic complications     Cardiovascular  Exercise tolerance (METS): >4,     Pulmonary  Negative pulmonary ROS Not a smoker , No asthma , No recent URI ,        GI/Hepatic    GERD well controlled,        Negative  ROS        Endo/Other  Negative endo/other ROS      GYN      Comment: menorrhagia     Hematology  Negative hematology ROS      Musculoskeletal  Negative musculoskeletal ROS        Neurology    Headaches,    Psychology   Negative psychology ROS              Physical Exam    Airway    Mallampati score: II  TM Distance: >3 FB  Neck ROM: full     Dental       Cardiovascular      Pulmonary      Other Findings        Anesthesia Plan  ASA Score- 1     Anesthesia Type- general with ASA Monitors  Additional Monitors:   Airway Plan: ETT  Comment: Recent labs personally reviewed:  Lab Results       Component                Value               Date                       WBC                      8 62                10/28/2019                 HGB                      14 3                10/28/2019                 PLT                      273                 10/28/2019            Lab Results       Component                Value               Date                       K                        4 3                 04/19/2018                 BUN                      12                  04/19/2018                 CREATININE               0 74                04/19/2018            No results found for: PTT   No results found for: INR    Blood type       I, Ramila Sandoval MD, have personally seen and evaluated the patient prior to anesthetic care  I have reviewed the pre-anesthetic record, medical history, allergies, medications and any other medical records if appropriate to the anesthetic care  If a CRNA is involved in the case, I have reviewed the CRNA assessment, if present, and agree    I discussed the anesthetic plan and risks/benefits/alternatives with the patient including possible PONV, sore throat, and possibility of rare anesthetic and surgical emergencies        Plan Factors-  Patient did not smoke on day of surgery  Induction- intravenous  Postoperative Plan- Plan for postoperative opioid use  Planned trial extubation    Informed Consent- Anesthetic plan and risks discussed with patient  I personally reviewed this patient with the CRNA  Discussed and agreed on the Anesthesia Plan with the CRNA  Elayne Samayoa

## 2020-02-04 ENCOUNTER — TELEPHONE (OUTPATIENT)
Dept: FAMILY MEDICINE CLINIC | Facility: CLINIC | Age: 45
End: 2020-02-04

## 2020-02-10 ENCOUNTER — TELEPHONE (OUTPATIENT)
Dept: OBGYN CLINIC | Facility: CLINIC | Age: 45
End: 2020-02-10

## 2020-07-08 ENCOUNTER — ANNUAL EXAM (OUTPATIENT)
Dept: OBGYN CLINIC | Facility: CLINIC | Age: 45
End: 2020-07-08
Payer: COMMERCIAL

## 2020-07-08 VITALS
WEIGHT: 120.2 LBS | BODY MASS INDEX: 20.52 KG/M2 | DIASTOLIC BLOOD PRESSURE: 62 MMHG | HEIGHT: 64 IN | SYSTOLIC BLOOD PRESSURE: 100 MMHG

## 2020-07-08 DIAGNOSIS — Z01.419 ENCOUNTER FOR GYNECOLOGICAL EXAMINATION (GENERAL) (ROUTINE) WITHOUT ABNORMAL FINDINGS: Primary | ICD-10-CM

## 2020-07-08 DIAGNOSIS — Z12.31 ENCOUNTER FOR SCREENING MAMMOGRAM FOR MALIGNANT NEOPLASM OF BREAST: ICD-10-CM

## 2020-07-08 PROBLEM — Z30.2 ENCOUNTER FOR STERILIZATION: Status: RESOLVED | Noted: 2020-01-16 | Resolved: 2020-07-08

## 2020-07-08 PROBLEM — N92.0 MENORRHAGIA WITH REGULAR CYCLE: Status: RESOLVED | Noted: 2020-01-16 | Resolved: 2020-07-08

## 2020-07-08 PROBLEM — N92.1 MENORRHAGIA WITH IRREGULAR CYCLE: Status: RESOLVED | Noted: 2019-10-28 | Resolved: 2020-07-08

## 2020-07-08 PROCEDURE — 3008F BODY MASS INDEX DOCD: CPT | Performed by: OBSTETRICS & GYNECOLOGY

## 2020-07-08 PROCEDURE — S0612 ANNUAL GYNECOLOGICAL EXAMINA: HCPCS | Performed by: OBSTETRICS & GYNECOLOGY

## 2020-07-08 NOTE — ASSESSMENT & PLAN NOTE
Pap/HPV current  Mammogram ordered    Encourage healthy diet, exercise, Calcium 1200mg per day and at least 800 iu Vitamin D daily

## 2020-07-08 NOTE — PROGRESS NOTES
Assessment/Plan:    Encounter for gynecological examination (general) (routine) without abnormal findings  Pap/HPV current  Mammogram ordered    Encourage healthy diet, exercise, Calcium 1200mg per day and at least 800 iu Vitamin D daily  Diagnoses and all orders for this visit:    Encounter for gynecological examination (general) (routine) without abnormal findings    Encounter for screening mammogram for malignant neoplasm of breast  -     Mammo screening bilateral w 3d & cad; Future          Subjective:      Patient ID: Alix Silva is a 45 y o  female  Annual visit     Since ablation had no bleeding until this last month but it was light  Dyspareunia  Chronic  Gynecologic Exam   The patient's pertinent negatives include no genital itching, genital lesions, genital odor, genital rash, pelvic pain, vaginal bleeding or vaginal discharge  The patient is experiencing no pain  Associated symptoms include painful intercourse  Pertinent negatives include no chills, constipation, diarrhea, fever, frequency, nausea, sore throat, urgency or vomiting  She is sexually active  Patient is here for yearly exam   Age of first period: 17  G3P 0121   LMP:ablation 01/2020   : no  Birth Control:tubal  Last pap:2/27/18 neg/ HPV neg  Last mammo:11/2/18- neg  Last colonoscopy:none  Last dexa:none  Pt describes herself to be a "medium" smoker  Pt is not a drinker  Patient does not exercise  The following portions of the patient's history were reviewed and updated as appropriate:   She  has a past medical history of Internal derangement of knee joint, left and Lyme disease    She   Patient Active Problem List    Diagnosis Date Noted    Encounter for gynecological examination (general) (routine) without abnormal findings 07/08/2020    Carpal tunnel syndrome of left wrist     Tobacco dependence 02/27/2018    Atypical chest pain 10/04/2017    Decreased libido without sexual dysfunction 10/04/2017  Esophageal reflux 03/04/2014    Migraine headache 05/30/2012     She  has a past surgical history that includes [place] tooth extraction; pr hysteroscopy,w/endo bx (N/A, 1/31/2020); pr hysteroscopy,w/endometrial ablation (N/A, 1/31/2020); pr remove intrauterine device (N/A, 1/31/2020); and pr lap,rmv  adnexal structure (Bilateral, 1/31/2020)  Her family history includes Heart disease in her maternal grandmother; Thyroid disease in her maternal grandmother, mother, and sister  She  reports that she has been smoking cigarettes  She has a 10 00 pack-year smoking history  She has never used smokeless tobacco  She reports that she drinks alcohol  She reports that she does not use drugs  Current Outpatient Medications   Medication Sig Dispense Refill    5-Hydroxytryptophan (5-HTP PO) Take by mouth daily      Cholecalciferol (VITAMIN D3) 1000 units CAPS Daily      cyclobenzaprine (FLEXERIL) 10 mg tablet Take 1 tablet (10 mg total) by mouth 3 (three) times a day as needed for muscle spasms 15 tablet 1    ibuprofen (MOTRIN) 600 mg tablet Take 1 tablet (600 mg total) by mouth every 6 (six) hours as needed for moderate pain 30 tablet 0    Multiple Vitamin (MULTIVITAMIN) tablet Take 1 tablet by mouth daily      Omeprazole 20 MG TBDD omeprazole 20 mg capsule,delayed release   Take 2 capsules as needed by oral route  No current facility-administered medications for this visit        Current Outpatient Medications on File Prior to Visit   Medication Sig    5-Hydroxytryptophan (5-HTP PO) Take by mouth daily    Cholecalciferol (VITAMIN D3) 1000 units CAPS Daily    cyclobenzaprine (FLEXERIL) 10 mg tablet Take 1 tablet (10 mg total) by mouth 3 (three) times a day as needed for muscle spasms    ibuprofen (MOTRIN) 600 mg tablet Take 1 tablet (600 mg total) by mouth every 6 (six) hours as needed for moderate pain    Multiple Vitamin (MULTIVITAMIN) tablet Take 1 tablet by mouth daily    [DISCONTINUED] PARAGARD INTRAUTERINE COPPER IUD by Intrauterine route    Omeprazole 20 MG TBDD omeprazole 20 mg capsule,delayed release   Take 2 capsules as needed by oral route   [DISCONTINUED] norethindrone (MICRONOR) 0 35 MG tablet Take 1 tablet (0 35 mg total) by mouth daily     No current facility-administered medications on file prior to visit  She is allergic to penicillins       Review of Systems   Constitutional: Negative for activity change, appetite change, chills, fatigue and fever  HENT: Negative for rhinorrhea, sneezing and sore throat  Eyes: Negative for visual disturbance  Respiratory: Negative for cough, shortness of breath and wheezing  Cardiovascular: Negative for chest pain, palpitations and leg swelling  Gastrointestinal: Negative for abdominal distention, constipation, diarrhea, nausea and vomiting  Genitourinary: Negative for difficulty urinating, frequency, pelvic pain, urgency and vaginal discharge  Neurological: Negative for syncope and light-headedness  Objective:      /62   Ht 5' 4" (1 626 m)   Wt 54 5 kg (120 lb 3 2 oz)   BMI 20 63 kg/m²          Physical Exam   Constitutional: She is oriented to person, place, and time  Pulmonary/Chest: Right breast exhibits no inverted nipple, no mass, no nipple discharge, no skin change and no tenderness  Left breast exhibits no inverted nipple, no mass, no nipple discharge, no skin change and no tenderness  No breast tenderness, discharge or bleeding  Breasts are symmetrical    Genitourinary: Vagina normal and uterus normal  No breast tenderness, discharge or bleeding  There is no rash, tenderness, lesion or injury on the right labia  There is no rash, tenderness, lesion or injury on the left labia  Uterus is not deviated, not enlarged, not fixed and not tender  Cervix exhibits no motion tenderness, no discharge and no friability  Right adnexum displays no mass, no tenderness and no fullness   Left adnexum displays no mass, no tenderness and no fullness  No tenderness or bleeding in the vagina  No vaginal discharge found  Neurological: She is alert and oriented to person, place, and time

## 2020-07-08 NOTE — PATIENT INSTRUCTIONS

## 2020-09-08 ENCOUNTER — OFFICE VISIT (OUTPATIENT)
Dept: OBGYN CLINIC | Facility: CLINIC | Age: 45
End: 2020-09-08
Payer: COMMERCIAL

## 2020-09-08 ENCOUNTER — APPOINTMENT (OUTPATIENT)
Dept: RADIOLOGY | Facility: CLINIC | Age: 45
End: 2020-09-08
Payer: COMMERCIAL

## 2020-09-08 VITALS
BODY MASS INDEX: 20.14 KG/M2 | TEMPERATURE: 97.9 F | HEIGHT: 64 IN | DIASTOLIC BLOOD PRESSURE: 68 MMHG | SYSTOLIC BLOOD PRESSURE: 118 MMHG | WEIGHT: 118 LBS

## 2020-09-08 DIAGNOSIS — M54.12 RADICULOPATHY, CERVICAL REGION: ICD-10-CM

## 2020-09-08 DIAGNOSIS — M47.812 SPONDYLOSIS OF CERVICAL REGION WITHOUT MYELOPATHY OR RADICULOPATHY: ICD-10-CM

## 2020-09-08 DIAGNOSIS — M47.814 SPONDYLOSIS OF THORACIC REGION WITHOUT MYELOPATHY OR RADICULOPATHY: Primary | ICD-10-CM

## 2020-09-08 DIAGNOSIS — R29.3 POSTURAL INSTABILITY: ICD-10-CM

## 2020-09-08 PROCEDURE — 4004F PT TOBACCO SCREEN RCVD TLK: CPT | Performed by: ORTHOPAEDIC SURGERY

## 2020-09-08 PROCEDURE — 72040 X-RAY EXAM NECK SPINE 2-3 VW: CPT

## 2020-09-08 PROCEDURE — 99203 OFFICE O/P NEW LOW 30 MIN: CPT | Performed by: ORTHOPAEDIC SURGERY

## 2020-09-08 PROCEDURE — 72070 X-RAY EXAM THORAC SPINE 2VWS: CPT

## 2020-09-08 NOTE — PROGRESS NOTES
Assessment/Plan:  Assessment/Plan   Diagnoses and all orders for this visit:    Radiculopathy, cervical region  -     XR spine cervical 2 or 3 vw injury; Future  -     XR spine thoracic 2 vw; Future    Discussed with patient that today's physical exam and x-ray findings are consistent multilevel DJD of both the cervical and thoracic spine  She is being provided a referral to physical therapy to address paraspinal and postural strengthening and range of motion  She can continue all activities as tolerated without limitations or restrictions  She can continue NSAIDs/Tylenol as needed for pain and soreness  Subjective:   Patient ID: Payal Painting is a 45 y o  female  HPI   Patient presents with chief complaint of cervical/thoracic pain with right-sided radiating symptoms  Patient states that she has been having ongoing neck pain for several months, and was evaluated by her chiropractor who took x-rays and informed her that she had degenerative findings at C5-C6  She then says that the chiropractor recommended long-term, frequent treatment, and before she continues to move forward with this plan, she wanted to be examined by an orthopedist   She describes that her pain is achy/type feeling, and is generally located the base of her neck  She also complains of pain in her right parascapular region that sometimes radiates into her right chest   She states that her pain seems to be exacerbated with stress  She denies any numbness or tingling sensations of the arms, but she does note that she has a previous medical history that includes carpal tunnel syndrome in the left upper extremity which has been treated, and is no longer symptomatic      The following portions of the patient's history were reviewed and updated as appropriate: allergies, current medications, past family history, past medical history, past social history, past surgical history and problem list     Past Medical History:   Diagnosis Date    Internal derangement of knee joint, left     resolved 10/04/2017    Lyme disease      Past Surgical History:   Procedure Laterality Date    TN HYSTEROSCOPY,W/ENDO BX N/A 1/31/2020    Procedure: DILATATION AND CURETTAGE (D&C) WITH HYSTEROSCOPY;  Surgeon: Kervin Patricio MD;  [facility];  Service: Gynecology    TN HYSTEROSCOPY,W/ENDOMETRIAL ABLATION N/A 1/31/2020    Procedure: Trey Askew;  Surgeon: Kervin Patricio MD;  [facility];  Service: Gynecology    TN LAP,RMV  ADNEXAL STRUCTURE Bilateral 1/31/2020    Procedure: SALPINGECTOMY, LAPAROSCOPIC BILATERAL;  Surgeon: Kervin Patricio MD;  [facility];  Service: Gynecology    TN REMOVE INTRAUTERINE DEVICE N/A 1/31/2020    Procedure: REMOVAL OF INTRAUTERINE DEVICE (IUD);   Surgeon: Kervin Patricio MD;  [facility];  Service: Gynecology    WISDOM TOOTH EXTRACTION       Family History   Problem Relation Age of Onset    Thyroid disease Mother     Thyroid disease Sister     Heart disease Maternal Grandmother         cardiac disorder    Thyroid disease Maternal Grandmother      Social History     Socioeconomic History    Marital status: /Civil Union     Spouse name: None    Number of children: 1    Years of education: None    Highest education level: None   Occupational History    Occupation:      Comment: at [employer] resource strain: None    Food insecurity     Worry: None     Inability: None    Transportation needs     Medical: None     Non-medical: None   Tobacco Use    Smoking status: Current Every Day Smoker     Packs/day: 0 50     Years: 20 00     Pack years: 10 00     Types: Cigarettes    Smokeless tobacco: Never Used   Substance and Sexual Activity    Alcohol use: Yes     Frequency: Monthly or less     Drinks per session: 1 or 2     Binge frequency: Never     Comment: socially    Drug use: No    Sexual activity: Yes     Partners: Male     Birth control/protection: Female Sterilization     Comment: ablation and tubal   Lifestyle    Physical activity     Days per week: None     Minutes per session: None    Stress: None   Relationships    Social connections     Talks on phone: None     Gets together: None     Attends Jew service: None     Active member of club or organization: None     Attends meetings of clubs or organizations: None     Relationship status: None    Intimate partner violence     Fear of current or ex partner: None     Emotionally abused: None     Physically abused: None     Forced sexual activity: None   Other Topics Concern    None   Social History Narrative    None       Current Outpatient Medications:     5-Hydroxytryptophan (5-HTP PO), Take by mouth daily, Disp: , Rfl:     Cholecalciferol (VITAMIN D3) 1000 units CAPS, Daily, Disp: , Rfl:     cyclobenzaprine (FLEXERIL) 10 mg tablet, Take 1 tablet (10 mg total) by mouth 3 (three) times a day as needed for muscle spasms, Disp: 15 tablet, Rfl: 1    ibuprofen (MOTRIN) 600 mg tablet, Take 1 tablet (600 mg total) by mouth every 6 (six) hours as needed for moderate pain, Disp: 30 tablet, Rfl: 0    Multiple Vitamin (MULTIVITAMIN) tablet, Take 1 tablet by mouth daily, Disp: , Rfl:     Omeprazole 20 MG TBDD, omeprazole 20 mg capsule,delayed release  Take 2 capsules as needed by oral route , Disp: , Rfl:     Allergies   Allergen Reactions    Penicillins      Childhood allergy       Review of Systems   Constitutional: Negative for chills, fever and unexpected weight change  HENT: Negative for hearing loss, nosebleeds and sore throat  Eyes: Negative for pain, redness and visual disturbance  Respiratory: Negative for cough, shortness of breath and wheezing  Cardiovascular: Negative for chest pain, palpitations and leg swelling  Gastrointestinal: Negative for abdominal pain, nausea and vomiting     Endocrine: Negative for polydipsia and polyuria  Genitourinary: Negative for dysuria and hematuria  Musculoskeletal:        As noted in HPI   Skin: Negative for rash and wound  Neurological: Negative for dizziness, numbness and headaches  Psychiatric/Behavioral: Negative for decreased concentration and suicidal ideas  The patient is not nervous/anxious  Objective:  /68 (BP Location: Left arm, Patient Position: Sitting, Cuff Size: Standard)   Temp 97 9 °F (36 6 °C) (Tympanic)   Ht 5' 4" (1 626 m)   Wt 53 5 kg (118 lb)   BMI 20 25 kg/m²     Ortho Exam   Cervical spine -   No obvious anatomical deformity  Skin is warm and dry to touch with no signs of erythema, ecchymosis, or infection  No tenderness over midline  Mild tenderness over paraspinal musculature  C5-T1 dermatomes and myotomes intact  Radial, median, and ulnar motor and sensory distributions intact  Triceps and brachioradialis reflexes intact    Throacic spine -   No obvious deformity  No tenderness over midline  Palpable trigger point in right scapular retractors    Bilateral shoulders -   No rotator cuff weakness  + Tinnel's in right cubital tunnel    Physical Exam  Constitutional:       Appearance: She is well-developed  HENT:      Right Ear: External ear normal       Left Ear: External ear normal       Nose: Nose normal    Eyes:      Conjunctiva/sclera: Conjunctivae normal       Pupils: Pupils are equal, round, and reactive to light  Neck:      Musculoskeletal: Normal range of motion  Pulmonary:      Effort: Pulmonary effort is normal    Musculoskeletal: Normal range of motion  Skin:     General: Skin is warm and dry  Neurological:      Mental Status: She is alert and oriented to person, place, and time  Psychiatric:         Behavior: Behavior normal          Thought Content:  Thought content normal          Judgment: Judgment normal        Imaging:  Attending Physician has personally reviewed pertinent imaging and/or reports in PACS, impression is as follows:     Review of radiographic series taken 9/8/2020 of the C-spine shows multilevel disc space narrowing, most significant at C5-6       Review of radiographic series taken 9/8/2020 of the T-spine show multilevel disc space narrowing with associated osteophyte formation and endplate sclerosis    Scribe Attestation    I,:   Ke Olivas am acting as a scribe while in the presence of the attending physician :        I,:   Sincere Le DO personally performed the services described in this documentation    as scribed in my presence :

## 2020-09-15 ENCOUNTER — EVALUATION (OUTPATIENT)
Dept: PHYSICAL THERAPY | Facility: CLINIC | Age: 45
End: 2020-09-15
Payer: COMMERCIAL

## 2020-09-15 DIAGNOSIS — M47.814 SPONDYLOSIS OF THORACIC REGION WITHOUT MYELOPATHY OR RADICULOPATHY: ICD-10-CM

## 2020-09-15 DIAGNOSIS — M47.812 SPONDYLOSIS OF CERVICAL REGION WITHOUT MYELOPATHY OR RADICULOPATHY: ICD-10-CM

## 2020-09-15 DIAGNOSIS — R29.3 POSTURAL INSTABILITY: ICD-10-CM

## 2020-09-15 PROCEDURE — 97110 THERAPEUTIC EXERCISES: CPT | Performed by: PHYSICAL THERAPIST

## 2020-09-15 PROCEDURE — 97161 PT EVAL LOW COMPLEX 20 MIN: CPT | Performed by: PHYSICAL THERAPIST

## 2020-09-15 PROCEDURE — 97140 MANUAL THERAPY 1/> REGIONS: CPT | Performed by: PHYSICAL THERAPIST

## 2020-09-15 NOTE — PROGRESS NOTES
PT Evaluation     Today's date: 9/15/2020  Patient name: Vicente Horan  : 1975  MRN: [id number]  Referring provider: Drea Domínguez DO  Dx:   Encounter Diagnosis     ICD-10-CM    1  Spondylosis of thoracic region without myelopathy or radiculopathy  M47 814 Ambulatory referral to Physical Therapy   2  Spondylosis of cervical region without myelopathy or radiculopathy  M47 812 Ambulatory referral to Physical Therapy   3  Postural instability  R29 3 Ambulatory referral to Physical Therapy                  Assessment  Assessment details: Patient presents with no lumbar or cervical spinal curves  Pect tightness and muscle guarding of the right T4 erector muscle  Right forward shoulder with minor scapular winging  Upslip right SI joint  Corrected Si with muscle energy and home exercise program   Patient can benefit from skilled PT to decrease muscle pain and improve alignment of the spine for return of postural curves  Understanding of Dx/Px/POC: good   Prognosis: good    Goals  3 weeks  Improve sitting posture 50% of time  Com-pliant with HEP  6 weeks  Decreased shoulder pain to min with activites  Able to sleep 6 hours  Lumbar lordosis in sitting and standing 75% of the time  Plan  Plan details: 4 weeks then 1x per week as needed for 1 month    Planned therapy interventions: strengthening, stretching, therapeutic activities and home exercise program  Frequency: 2x week        Subjective Evaluation    History of Present Illness  Mechanism of injury: Exacerbation neck pain and thoracic pain  Pain  Current pain rating: 3  At worst pain rating: 10  Quality: sharp  Relieving factors: medications    Patient Goals  Patient goals for therapy: decreased pain and return to sport/leisure activities          Objective     Active Range of Motion   Cervical/Thoracic Spine       Cervical    Flexion:  WFL  Extension:  Restriction level: minimal  Left rotation:  Restriction level: minimal  Right rotation:  Restriction level: minimal    Thoracic    Right rotation:  Restriction level: minimal    Lumbar   Flexion:  Restriction level: maximal  Extension:  Restriction level: minimal  Left rotation:  Restriction level: minimal  Right rotation:  Restriction level: minimal    General Comments:      Hip Comments   + neural tension L>R      Flowsheet Rows      Most Recent Value   PT/OT G-Codes   Current Score  69   Projected Score  75             Precautions: lymes      Manuals 9/15            graston back 10'            ME jt mob X                                      Neuro Re-Ed             yoga             Cobra/aislinn pose             Prone 2-ways                                                                 Ther Ex             Wand [place]             planks             Side plans             Elliptical             Ball posutre             Ball back ext             Sadia Paterson push ups             HEP stretching 15            Ther Activity             lifting                          Gait Training                                       Modalities             Heat/stim

## 2020-09-17 ENCOUNTER — OFFICE VISIT (OUTPATIENT)
Dept: PHYSICAL THERAPY | Facility: CLINIC | Age: 45
End: 2020-09-17
Payer: COMMERCIAL

## 2020-09-17 DIAGNOSIS — R29.3 POSTURAL INSTABILITY: ICD-10-CM

## 2020-09-17 DIAGNOSIS — M47.812 SPONDYLOSIS OF CERVICAL REGION WITHOUT MYELOPATHY OR RADICULOPATHY: ICD-10-CM

## 2020-09-17 DIAGNOSIS — M47.814 SPONDYLOSIS OF THORACIC REGION WITHOUT MYELOPATHY OR RADICULOPATHY: Primary | ICD-10-CM

## 2020-09-17 PROCEDURE — 97140 MANUAL THERAPY 1/> REGIONS: CPT | Performed by: PHYSICAL THERAPIST

## 2020-09-17 PROCEDURE — 97140 MANUAL THERAPY 1/> REGIONS: CPT

## 2020-09-17 PROCEDURE — 97112 NEUROMUSCULAR REEDUCATION: CPT

## 2020-09-17 NOTE — PROGRESS NOTES
Daily Note     Today's date: 2020  Patient name: Sheila Walden  : 1975  MRN: [id number]  Referring provider: Yahir Olmedo DO  Dx:   Encounter Diagnosis     ICD-10-CM    1  Spondylosis of thoracic region without myelopathy or radiculopathy  M47 814    2  Spondylosis of cervical region without myelopathy or radiculopathy  M47 812    3  Postural instability  R29 3        Start Time: 1800  Stop Time: 1845  Total time in clinic (min): 45 minutes    Subjective: Patient stated that she is really sore and tight today, 5/10 pain  Objective: See treatment diary below      Assessment: Psoas release and ME to realign hips  Graston to BL paraspinals and UT to reduce muscular tightness  MFR to BL pecs to relieve fwd rolled shoulders and muscular tightness  Instructed patient in aislinn pose and cat-camel for gentle muscular stretch  Patient did not feel relief w/ cobra and felt some discomfort in LB  Issued HEP  Plan: Continue per plan of care        Precautions: lymes      Manuals 9/15 9/17           graston back 10' 25'/MFR to pecs           ME jt mob X 10 by GT                                     Neuro Re-Ed             yoga             Cobra/aislinn pose  10'           Prone 2-ways                                                                 Ther Ex             Wand [place]             planks             Side plans             Elliptical             Ball posutre             Ball back ext             [place] push ups             HEP stretching 15            Ther Activity             lifting                          Gait Training                                       Modalities             Heat/stim

## 2020-09-22 ENCOUNTER — OFFICE VISIT (OUTPATIENT)
Dept: PHYSICAL THERAPY | Facility: CLINIC | Age: 45
End: 2020-09-22
Payer: COMMERCIAL

## 2020-09-22 DIAGNOSIS — M47.812 SPONDYLOSIS OF CERVICAL REGION WITHOUT MYELOPATHY OR RADICULOPATHY: Primary | ICD-10-CM

## 2020-09-22 DIAGNOSIS — M47.814 SPONDYLOSIS OF THORACIC REGION WITHOUT MYELOPATHY OR RADICULOPATHY: ICD-10-CM

## 2020-09-22 PROCEDURE — 97112 NEUROMUSCULAR REEDUCATION: CPT | Performed by: PHYSICAL THERAPIST

## 2020-09-22 PROCEDURE — 97110 THERAPEUTIC EXERCISES: CPT | Performed by: PHYSICAL THERAPIST

## 2020-09-22 PROCEDURE — 97140 MANUAL THERAPY 1/> REGIONS: CPT | Performed by: PHYSICAL THERAPIST

## 2020-09-22 NOTE — PROGRESS NOTES
Daily Note     Today's date: 2020  Patient name: Hassell Merlin  : 1975  MRN: [id number]  Referring provider: Janette Savage DO  Dx:   Encounter Diagnosis     ICD-10-CM    1  Spondylosis of cervical region without myelopathy or radiculopathy  M47 812    2  Spondylosis of thoracic region without myelopathy or radiculopathy  M47 814                   Subjective: Patient stated that she is compliant with HEP 2/10      Objective: See treatment diary below      Assessment: Strengthening exercises in gym today  Prone 2-ways  MFR and ME for cervical alignment          Plan: Continue per plan of care        Precautions: lymes      Manuals 9/15 9/17 9/22          graston back 10' 25'/MFR to pecs           ME jt mob X 10 by GT 10'                                    Neuro Re-Ed             yoga             Cobra/aislinn pose  10'           Prone 2-ways   10x2                                                              Ther Ex             Circuit UE   20x          pulleys             Side plans             Elliptical   3/2          Ball posutre             Ball back ext             [place] push ups             HEP stretching 15            Ther Activity             lifting                          Gait Training                                       Modalities             Heat/stim

## 2020-09-29 ENCOUNTER — APPOINTMENT (OUTPATIENT)
Dept: PHYSICAL THERAPY | Facility: CLINIC | Age: 45
End: 2020-09-29
Payer: COMMERCIAL

## 2020-10-01 ENCOUNTER — OFFICE VISIT (OUTPATIENT)
Dept: PHYSICAL THERAPY | Facility: CLINIC | Age: 45
End: 2020-10-01
Payer: COMMERCIAL

## 2020-10-01 DIAGNOSIS — R29.3 POSTURAL INSTABILITY: ICD-10-CM

## 2020-10-01 DIAGNOSIS — M47.814 SPONDYLOSIS OF THORACIC REGION WITHOUT MYELOPATHY OR RADICULOPATHY: Primary | ICD-10-CM

## 2020-10-01 PROCEDURE — 97140 MANUAL THERAPY 1/> REGIONS: CPT | Performed by: PHYSICAL THERAPIST

## 2020-10-01 PROCEDURE — 97112 NEUROMUSCULAR REEDUCATION: CPT | Performed by: PHYSICAL THERAPIST

## 2020-10-01 PROCEDURE — 97110 THERAPEUTIC EXERCISES: CPT | Performed by: PHYSICAL THERAPIST

## 2020-10-05 ENCOUNTER — OFFICE VISIT (OUTPATIENT)
Dept: PHYSICAL THERAPY | Facility: CLINIC | Age: 45
End: 2020-10-05
Payer: COMMERCIAL

## 2020-10-05 DIAGNOSIS — M47.814 SPONDYLOSIS OF THORACIC REGION WITHOUT MYELOPATHY OR RADICULOPATHY: Primary | ICD-10-CM

## 2020-10-05 DIAGNOSIS — R29.3 POSTURAL INSTABILITY: ICD-10-CM

## 2020-10-05 PROCEDURE — 97140 MANUAL THERAPY 1/> REGIONS: CPT | Performed by: PHYSICAL THERAPIST

## 2020-10-05 PROCEDURE — 97112 NEUROMUSCULAR REEDUCATION: CPT | Performed by: PHYSICAL THERAPIST

## 2020-10-05 PROCEDURE — 97110 THERAPEUTIC EXERCISES: CPT | Performed by: PHYSICAL THERAPIST

## 2020-10-08 ENCOUNTER — APPOINTMENT (OUTPATIENT)
Dept: PHYSICAL THERAPY | Facility: CLINIC | Age: 45
End: 2020-10-08
Payer: COMMERCIAL

## 2020-10-15 ENCOUNTER — APPOINTMENT (OUTPATIENT)
Dept: PHYSICAL THERAPY | Facility: CLINIC | Age: 45
End: 2020-10-15
Payer: COMMERCIAL

## 2020-10-15 DIAGNOSIS — N76.0 ACUTE VAGINITIS: Primary | ICD-10-CM

## 2020-10-15 RX ORDER — AZITHROMYCIN 500 MG/1
1000 TABLET, FILM COATED ORAL DAILY
Qty: 2 TABLET | Refills: 0 | Status: SHIPPED | OUTPATIENT
Start: 2020-10-15 | End: 2020-10-16

## 2020-10-19 ENCOUNTER — APPOINTMENT (OUTPATIENT)
Dept: PHYSICAL THERAPY | Facility: CLINIC | Age: 45
End: 2020-10-19
Payer: COMMERCIAL

## 2020-10-22 ENCOUNTER — APPOINTMENT (OUTPATIENT)
Dept: PHYSICAL THERAPY | Facility: CLINIC | Age: 45
End: 2020-10-22
Payer: COMMERCIAL

## 2020-11-05 ENCOUNTER — APPOINTMENT (OUTPATIENT)
Dept: PHYSICAL THERAPY | Facility: CLINIC | Age: 45
End: 2020-11-05
Payer: COMMERCIAL

## 2020-11-27 ENCOUNTER — EVALUATION (OUTPATIENT)
Dept: PHYSICAL THERAPY | Facility: CLINIC | Age: 45
End: 2020-11-27
Payer: COMMERCIAL

## 2020-11-27 DIAGNOSIS — M54.2 CERVICAL PAIN (NECK): ICD-10-CM

## 2020-11-27 DIAGNOSIS — M54.6 PAIN IN THORACIC SPINE: Primary | ICD-10-CM

## 2020-11-27 PROCEDURE — 97162 PT EVAL MOD COMPLEX 30 MIN: CPT | Performed by: PHYSICAL THERAPIST

## 2020-12-03 ENCOUNTER — OFFICE VISIT (OUTPATIENT)
Dept: PHYSICAL THERAPY | Facility: CLINIC | Age: 45
End: 2020-12-03
Payer: COMMERCIAL

## 2020-12-03 DIAGNOSIS — M54.2 CERVICAL PAIN (NECK): ICD-10-CM

## 2020-12-03 DIAGNOSIS — M54.6 PAIN IN THORACIC SPINE: Primary | ICD-10-CM

## 2020-12-03 PROCEDURE — 97140 MANUAL THERAPY 1/> REGIONS: CPT | Performed by: PHYSICAL THERAPIST

## 2020-12-03 PROCEDURE — 97110 THERAPEUTIC EXERCISES: CPT | Performed by: PHYSICAL THERAPIST

## 2020-12-08 ENCOUNTER — OFFICE VISIT (OUTPATIENT)
Dept: PHYSICAL THERAPY | Facility: CLINIC | Age: 45
End: 2020-12-08
Payer: COMMERCIAL

## 2020-12-08 DIAGNOSIS — M54.6 PAIN IN THORACIC SPINE: Primary | ICD-10-CM

## 2020-12-08 DIAGNOSIS — M54.2 CERVICAL PAIN (NECK): ICD-10-CM

## 2020-12-08 PROCEDURE — 97140 MANUAL THERAPY 1/> REGIONS: CPT

## 2020-12-08 PROCEDURE — 97112 NEUROMUSCULAR REEDUCATION: CPT

## 2020-12-08 PROCEDURE — 97110 THERAPEUTIC EXERCISES: CPT

## 2020-12-10 ENCOUNTER — APPOINTMENT (OUTPATIENT)
Dept: PHYSICAL THERAPY | Facility: CLINIC | Age: 45
End: 2020-12-10
Payer: COMMERCIAL

## 2020-12-15 ENCOUNTER — APPOINTMENT (OUTPATIENT)
Dept: PHYSICAL THERAPY | Facility: CLINIC | Age: 45
End: 2020-12-15
Payer: COMMERCIAL

## 2020-12-17 ENCOUNTER — APPOINTMENT (OUTPATIENT)
Dept: PHYSICAL THERAPY | Facility: CLINIC | Age: 45
End: 2020-12-17
Payer: COMMERCIAL

## 2020-12-23 ENCOUNTER — TELEPHONE (OUTPATIENT)
Dept: OBGYN CLINIC | Facility: CLINIC | Age: 45
End: 2020-12-23

## 2021-01-26 DIAGNOSIS — B37.49 CANDIDA INFECTION OF GENITAL REGION: Primary | ICD-10-CM

## 2021-01-26 RX ORDER — FLUCONAZOLE 150 MG/1
150 TABLET ORAL ONCE
Qty: 2 TABLET | Refills: 2 | Status: SHIPPED | OUTPATIENT
Start: 2021-01-26 | End: 2021-01-26

## 2021-03-13 ENCOUNTER — TRANSCRIBE ORDERS (OUTPATIENT)
Dept: ADMINISTRATIVE | Facility: HOSPITAL | Age: 46
End: 2021-03-13

## 2021-03-13 ENCOUNTER — OFFICE VISIT (OUTPATIENT)
Dept: LAB | Facility: HOSPITAL | Age: 46
End: 2021-03-13
Payer: COMMERCIAL

## 2021-03-13 ENCOUNTER — APPOINTMENT (OUTPATIENT)
Dept: LAB | Facility: HOSPITAL | Age: 46
End: 2021-03-13
Payer: COMMERCIAL

## 2021-03-13 DIAGNOSIS — Z01.818 OTHER SPECIFIED PRE-OPERATIVE EXAMINATION: Primary | ICD-10-CM

## 2021-03-13 DIAGNOSIS — Z01.818 OTHER SPECIFIED PRE-OPERATIVE EXAMINATION: ICD-10-CM

## 2021-03-13 LAB
ANION GAP SERPL CALCULATED.3IONS-SCNC: 8 MMOL/L (ref 4–13)
BUN SERPL-MCNC: 12 MG/DL (ref 5–25)
CALCIUM SERPL-MCNC: 9.2 MG/DL (ref 8.3–10.1)
CHLORIDE SERPL-SCNC: 102 MMOL/L (ref 100–108)
CO2 SERPL-SCNC: 29 MMOL/L (ref 21–32)
CREAT SERPL-MCNC: 0.86 MG/DL (ref 0.6–1.3)
GFR SERPL CREATININE-BSD FRML MDRD: 83 ML/MIN/1.73SQ M
GLUCOSE P FAST SERPL-MCNC: 94 MG/DL (ref 65–99)
POTASSIUM SERPL-SCNC: 4.4 MMOL/L (ref 3.5–5.3)
SODIUM SERPL-SCNC: 139 MMOL/L (ref 136–145)

## 2021-03-13 PROCEDURE — 36415 COLL VENOUS BLD VENIPUNCTURE: CPT

## 2021-03-13 PROCEDURE — 80048 BASIC METABOLIC PNL TOTAL CA: CPT

## 2021-03-13 PROCEDURE — 93005 ELECTROCARDIOGRAM TRACING: CPT

## 2021-03-14 LAB
ATRIAL RATE: 80 BPM
P AXIS: 67 DEGREES
PR INTERVAL: 114 MS
QRS AXIS: 80 DEGREES
QRSD INTERVAL: 78 MS
QT INTERVAL: 382 MS
QTC INTERVAL: 440 MS
T WAVE AXIS: 63 DEGREES
VENTRICULAR RATE: 80 BPM

## 2021-03-14 PROCEDURE — 93010 ELECTROCARDIOGRAM REPORT: CPT | Performed by: INTERNAL MEDICINE

## 2021-03-16 ENCOUNTER — HOSPITAL ENCOUNTER (OUTPATIENT)
Dept: MAMMOGRAPHY | Facility: HOSPITAL | Age: 46
Discharge: HOME | End: 2021-03-16
Payer: COMMERCIAL

## 2021-03-16 VITALS — BODY MASS INDEX: 20.14 KG/M2 | HEIGHT: 64 IN | WEIGHT: 118 LBS

## 2021-03-16 DIAGNOSIS — Z12.31 ENCOUNTER FOR SCREENING MAMMOGRAM FOR MALIGNANT NEOPLASM OF BREAST: ICD-10-CM

## 2021-03-16 PROCEDURE — 77067 SCR MAMMO BI INCL CAD: CPT

## 2021-03-16 PROCEDURE — 77063 BREAST TOMOSYNTHESIS BI: CPT

## 2021-05-19 ENCOUNTER — PROCEDURE VISIT (OUTPATIENT)
Dept: OBGYN CLINIC | Facility: CLINIC | Age: 46
End: 2021-05-19
Payer: COMMERCIAL

## 2021-05-19 VITALS
SYSTOLIC BLOOD PRESSURE: 120 MMHG | DIASTOLIC BLOOD PRESSURE: 74 MMHG | HEIGHT: 64 IN | BODY MASS INDEX: 20.76 KG/M2 | WEIGHT: 121.6 LBS

## 2021-05-19 DIAGNOSIS — N75.0 BARTHOLIN CYST: Primary | ICD-10-CM

## 2021-05-19 PROCEDURE — 56405 I&D VULVA/PERINEAL ABSCESS: CPT | Performed by: NURSE PRACTITIONER

## 2021-05-19 RX ORDER — LIDOCAINE 50 MG/G
OINTMENT TOPICAL AS NEEDED
Qty: 35.44 G | Refills: 0 | Status: SHIPPED | OUTPATIENT
Start: 2021-05-19 | End: 2021-05-19

## 2021-05-19 RX ORDER — LIDOCAINE HYDROCHLORIDE 20 MG/ML
JELLY TOPICAL 2 TIMES DAILY
Qty: 30 ML | Refills: 0 | Status: SHIPPED | OUTPATIENT
Start: 2021-05-19 | End: 2022-04-06

## 2021-05-19 NOTE — PATIENT INSTRUCTIONS
Bartholin Cyst   WHAT YOU NEED TO KNOW:   What is a Bartholin cyst?  A Bartholin cyst is a lump near the opening to your vagina  You may have pain in this area when you walk or have sex  A Bartholin cyst is caused by blockage of your Bartholin gland  You have a Bartholin gland on each side of your vagina  The glands produce fluid to moisten your vagina  Over time the fluid can build up in the gland and form a cyst  The cyst may become infected  You may be at risk for a Bartholin cyst if you have a sexually transmitted infection  An injury or surgery near your vagina may also increase you risk  How is a Bartholin cyst diagnosed and treated? Your healthcare provider will examine your vagina  A sample of fluid from your vagina may be collected  The fluid can be tested for sexually transmitted infections  Your healthcare provider may tell you how to treat your cyst at home  Instead, you may need any of the following:  · Medicine  may be given to treat or prevent an infection  Medicine may also be given to decrease pain and swelling  · Incision and drainage  is a procedure to drain the cyst  Your healthcare provider will make an opening in the cyst so it can drain  He or she may also place packing or a drain in your wound  The drain will help keep your gland open and drain extra fluid  The packing will be removed in 24 to 48 hours  The drain may be left in place for 4 to 6 weeks  · Surgery  may be needed if other treatments do not work  Surgery may be done to hold your gland open and prevent another blockage  Surgery may also be done to remove 1 or both of your Bartholin glands  What can I do to care for myself if my cyst is not drained? · Take a sitz bath  3 to 4 times each day or as directed  A sitz bath may help relieve swelling and pain  It will also help open your Bartholin glands so they drain normally  Place a clean towel on the bottom of your bath tub   Fill your bath tub with warm water up to your hips  You can also buy a sitz bath that fits in your toilet  Sit in the water for 10 minutes  · Apply a warm compress  to your cyst  This may relieve swelling and pain  A warm compress will also help open your Bartholin glands so they drain normally  Wet a washcloth in warm, but not hot, water  Apply the compress for 10 minutes  Repeat 4 times each day  · Keep the area around your vagina clean  Always wipe front to back  Shower once a day  Gently pat the area dry after a shower  What can I do to care for myself after my cyst is drained? · Take a sitz bath  in 24 to 48 hours or as directed  You may need to wait to take a sitz bath until after your packing is removed  A sitz bath may help relieve swelling and pain  Take a sitz bath 3 to 4 times each day for 3 days  Place a clean towel on the bottom of your bath tub  Fill your bath tub with warm water up to your hips  You can also buy a sitz bath that fits in your toilet  Sit in the water for 10 minutes  · Wear a sanitary pad  to absorb drainage from your wound  You may have drainage for a few weeks after your cyst is drained  · Ask your healthcare provider if it is okay to have sex  Sex may cause your drain to fall out  It may also increase your risk for an infection  · Keep the area around your vagina clean  Always wipe front to back  Shower once a day  Gently pat the area dry after a shower  When should I contact my gynecologist or healthcare provider? · You have a fever  · Your cyst gets larger or becomes more painful  · Your cyst returns after treatment  · Your drain falls out  · You have pus, redness, or swelling where the cyst was drained  · You have questions or concerns about your condition or care  CARE AGREEMENT:   You have the right to help plan your care  Learn about your health condition and how it may be treated   Discuss treatment options with your healthcare providers to decide what care you want to receive  You always have the right to refuse treatment  The above information is an  only  It is not intended as medical advice for individual conditions or treatments  Talk to your doctor, nurse or pharmacist before following any medical regimen to see if it is safe and effective for you  © Copyright [address] Information is for End User's use only and may not be sold, redistributed or otherwise used for commercial purposes   All illustrations and images included in CareNotes® are the copyrighted property of A D A M , Inc  or [address] [place] [address]

## 2021-05-19 NOTE — PROGRESS NOTES
Incision and drain    Date/Time: 5/19/2021 3:39 PM  Performed by: BRANDIN Aldrich  Authorized by: BRANDIN Aldrich   Universal Protocol:  Consent: Verbal consent obtained  Risks and benefits: risks, benefits and alternatives were discussed  Consent given by: patient  Patient understanding: patient states understanding of the procedure being performed  Patient identity confirmed: verbally with patient      Location:     Type:  Cyst    Size:  2cm    Location:  Anogenital    Anogenital location:  Vulva  Pre-procedure details:     Skin preparation:  Betadine  Anesthesia (see MAR for exact dosages): Anesthesia method:  Local infiltration    Local anesthetic:  Lidocaine 1% WITH epi  Procedure details:     Complexity:  Simple    Incision types:  Stab incision    Scalpel blade:  11    Approach:  Puncture    Incision depth:  Subcutaneous    Wound management:  Probed and deloculated    Drainage:  Bloody    Drainage amount:  Scant    Wound treatment:  Wound left open    Packing materials:  None  Comments:      Pleasant 39 y o female presents for labial cyst   Has hx of previous Bartholin cyst with Word Catheter placement in the past on right side  Current cyst in similar location but lower on labia  She notice yesterday  Tender, denies F/C, and no drainage  Has no started anything for treatment yet  Tolerated I&D well, scant amount of sebaceous drainage at first then small bloody drainage after  Reviewed with pt, unable to remove encapsulation, cyst may return  Verbalized understanding  Use warm compresses BID, massage with coconut oil, apply antibiotic ointment given today  If fever, redness develop can RTO in 2 days  Call office with any concerns  Letter for work given for tomorrow  Lidocaine ointment ordered for pain

## 2022-03-29 ENCOUNTER — RA CDI HCC (OUTPATIENT)
Dept: OTHER | Facility: HOSPITAL | Age: 47
End: 2022-03-29

## 2022-04-06 ENCOUNTER — OFFICE VISIT (OUTPATIENT)
Dept: FAMILY MEDICINE CLINIC | Facility: CLINIC | Age: 47
End: 2022-04-06
Payer: COMMERCIAL

## 2022-04-06 VITALS
WEIGHT: 124 LBS | DIASTOLIC BLOOD PRESSURE: 80 MMHG | HEART RATE: 92 BPM | OXYGEN SATURATION: 99 % | HEIGHT: 64 IN | TEMPERATURE: 98 F | SYSTOLIC BLOOD PRESSURE: 126 MMHG | BODY MASS INDEX: 21.17 KG/M2

## 2022-04-06 DIAGNOSIS — K21.9 GASTROESOPHAGEAL REFLUX DISEASE, UNSPECIFIED WHETHER ESOPHAGITIS PRESENT: ICD-10-CM

## 2022-04-06 DIAGNOSIS — Z13.220 SCREENING CHOLESTEROL LEVEL: ICD-10-CM

## 2022-04-06 DIAGNOSIS — Z72.0 TOBACCO USE: ICD-10-CM

## 2022-04-06 DIAGNOSIS — Z12.31 ENCOUNTER FOR SCREENING MAMMOGRAM FOR MALIGNANT NEOPLASM OF BREAST: Primary | ICD-10-CM

## 2022-04-06 DIAGNOSIS — Z13.1 SCREENING FOR DIABETES MELLITUS: ICD-10-CM

## 2022-04-06 DIAGNOSIS — Z13.29 SCREENING FOR THYROID DISORDER: ICD-10-CM

## 2022-04-06 PROCEDURE — 99214 OFFICE O/P EST MOD 30 MIN: CPT | Performed by: INTERNAL MEDICINE

## 2022-04-06 NOTE — PROGRESS NOTES
Depression Screening and Follow-up Plan: Patient was screened for depression during today's encounter  They screened negative with a PHQ-2 score of 0  Tobacco Cessation Counseling: The patient is sincerely urged to quit consumption of tobacco  She is not ready to quit tobacco  Patient refused medication  Assessment/Plan:         Diagnoses and all orders for this visit:    Encounter for screening mammogram for malignant neoplasm of breast  -     Mammo screening bilateral w 3d & cad; Future    Tobacco use  -     Comprehensive metabolic panel; Future    Gastroesophageal reflux disease, unspecified whether esophagitis present  -     CBC; Future    Screening cholesterol level  -     Lipid panel; Future    Screening for thyroid disorder  -     TSH, 3rd generation with Free T4 reflex; Future    Screening for diabetes mellitus  -     Hemoglobin A1C; Future          Subjective:      Patient ID: Ryann Grubbs is a 42 y o  female  Pt upset  Her   unexpectedly 3 weeks ago  She has smoked since age 17  1ppdx 34 years  Denies f/s/c, +am cough intermitant  Denies change in weight  Feels her migrains worse with husbands death  Reflux stable x years  Too young for lung cancer screening ct      The following portions of the patient's history were reviewed and updated as appropriate: She  has a past medical history of Internal derangement of knee joint, left and Lyme disease    She   Patient Active Problem List    Diagnosis Date Noted    Bartholin cyst 2021    Encounter for gynecological examination (general) (routine) without abnormal findings 2020    Carpal tunnel syndrome of left wrist     Tobacco dependence 2018    Atypical chest pain 10/04/2017    Decreased libido without sexual dysfunction 10/04/2017    Esophageal reflux 2014    Migraine headache 2012     She  has a past surgical history that includes [place] tooth extraction; pr hysteroscopy,w/endo bx (N/A, 2020); pr hysteroscopy,w/endometrial ablation (N/A, 1/31/2020); pr remove intrauterine device (N/A, 1/31/2020); pr lap,rmv  adnexal structure (Bilateral, 1/31/2020); and Breast surgery  Her family history includes Heart disease in her maternal grandmother; No Known Problems in her maternal aunt and paternal grandmother; Thyroid disease in her maternal grandmother, mother, and sister  She  reports that she has been smoking cigarettes  She has a 20 00 pack-year smoking history  She has never used smokeless tobacco  She reports current alcohol use  She reports that she does not use drugs  Current Outpatient Medications   Medication Sig Dispense Refill    Multiple Vitamins-Minerals (AIRBORNE PO) Take by mouth      Acetaminophen (TYLENOL PO) Take by mouth      Ascorbic Acid (VITAMIN C PO) Take by mouth      Cholecalciferol (VITAMIN D3) 1000 units CAPS Daily      ibuprofen (MOTRIN) 600 mg tablet Take 1 tablet (600 mg total) by mouth every 6 (six) hours as needed for moderate pain 30 tablet 0    Multiple Vitamin (MULTIVITAMIN) tablet Take 1 tablet by mouth daily       No current facility-administered medications for this visit  Current Outpatient Medications on File Prior to Visit   Medication Sig    Multiple Vitamins-Minerals (AIRBORNE PO) Take by mouth    Acetaminophen (TYLENOL PO) Take by mouth    Ascorbic Acid (VITAMIN C PO) Take by mouth    Cholecalciferol (VITAMIN D3) 1000 units CAPS Daily    ibuprofen (MOTRIN) 600 mg tablet Take 1 tablet (600 mg total) by mouth every 6 (six) hours as needed for moderate pain    Multiple Vitamin (MULTIVITAMIN) tablet Take 1 tablet by mouth daily     No current facility-administered medications on file prior to visit  She is allergic to penicillins       Review of Systems   Constitutional: Negative for chills, fever and unexpected weight change  HENT: Negative  Respiratory: Positive for cough  Cardiovascular: Negative  Neurological: Positive for headaches  Objective:      /80 (BP Location: Right arm, Patient Position: Sitting, Cuff Size: Standard)   Pulse 92   Temp 98 °F (36 7 °C) (Temporal)   Ht 5' 4" (1 626 m)   Wt 56 2 kg (124 lb)   SpO2 99%   BMI 21 28 kg/m²          Physical Exam  Constitutional:       Appearance: Normal appearance  HENT:      Head: Normocephalic and atraumatic  Right Ear: Tympanic membrane and ear canal normal       Left Ear: Tympanic membrane and ear canal normal       Mouth/Throat:      Pharynx: No posterior oropharyngeal erythema  Cardiovascular:      Rate and Rhythm: Normal rate and regular rhythm  Pulmonary:      Effort: Pulmonary effort is normal       Breath sounds: Normal breath sounds  Musculoskeletal:      Cervical back: Neck supple  Lymphadenopathy:      Cervical: No cervical adenopathy  Neurological:      Mental Status: She is alert

## 2022-04-15 ENCOUNTER — OFFICE VISIT (OUTPATIENT)
Dept: FAMILY MEDICINE CLINIC | Facility: CLINIC | Age: 47
End: 2022-04-15
Payer: COMMERCIAL

## 2022-04-15 VITALS
OXYGEN SATURATION: 99 % | SYSTOLIC BLOOD PRESSURE: 104 MMHG | BODY MASS INDEX: 21.34 KG/M2 | HEART RATE: 92 BPM | TEMPERATURE: 98.7 F | DIASTOLIC BLOOD PRESSURE: 76 MMHG | WEIGHT: 125 LBS | HEIGHT: 64 IN | RESPIRATION RATE: 16 BRPM

## 2022-04-15 DIAGNOSIS — J06.9 VIRAL UPPER RESPIRATORY TRACT INFECTION: Primary | ICD-10-CM

## 2022-04-15 PROCEDURE — 3725F SCREEN DEPRESSION PERFORMED: CPT | Performed by: FAMILY MEDICINE

## 2022-04-15 PROCEDURE — 99213 OFFICE O/P EST LOW 20 MIN: CPT | Performed by: FAMILY MEDICINE

## 2022-04-15 PROCEDURE — 4004F PT TOBACCO SCREEN RCVD TLK: CPT | Performed by: FAMILY MEDICINE

## 2022-04-15 PROCEDURE — 3008F BODY MASS INDEX DOCD: CPT | Performed by: FAMILY MEDICINE

## 2022-04-15 RX ORDER — AZITHROMYCIN 250 MG/1
TABLET, FILM COATED ORAL
Qty: 6 TABLET | Refills: 0 | Status: SHIPPED | OUTPATIENT
Start: 2022-04-15 | End: 2022-04-20

## 2022-04-15 NOTE — PROGRESS NOTES
Assessment/Plan:    Viral upper respiratory tract infection  Unspecified viral upper respiratory symptoms  There is some evidence otitis media as well as sinusitis  After discussion, will start patient with [facility] for 5 days for treatment  Please monitor symptoms, if he noticed worsening respiratory symptoms, avoid close contacts    Please maintain appropriate social distancing, wear a mask at all public spaces, wash hands frequently and clean surface after use  If you have fever greater than 104° that does not respond to Tylenol, difficulty eating or drinking, difficulty breathing please report to ER for evaluation          Subjective:      Patient ID: Blanquita Bernabe is a 42 y o  female  HPI     77-year-old female patient presents with 3 day history of URI symptoms  Patient reports worsening symptoms with her symptoms being worse on Thursday  Symptoms include congestion, sinus pressure, sore throat, cough with frontal headaches  Patient denies any seasonal allergies however has had watery eyes and sneezing episodes  Of note, 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 has been exposed to mold recently  Her sore throat has improved after Motrin  She has been taking Mucinex with some improvement her symptoms  Denies any fevers, abdominal pain, nausea, vomiting, diarrhea  Patient denies any significant change in her sense of taste or smell but does have some stuffy nose  She is vaccinated against COVID-19 but not against influenza  Denies any recent sick contacts  Denies any recent antibiotic use in the last month  History of penicillin allergy as a   Patient is currently smoking      The following portions of the patient's history were reviewed and updated as appropriate: allergies, current medications, past family history, past medical history, past social history, past surgical history and problem list       Review of Systems   Constitutional: Negative for appetite change, chills, fatigue and fever  HENT: Positive for congestion, rhinorrhea, sinus pressure, sinus pain, sneezing and sore throat  Negative for trouble swallowing  Eyes:        Watery eye the worst symptom   Respiratory: Negative for chest tightness and shortness of breath  Cardiovascular: Negative for chest pain  Gastrointestinal: Negative for abdominal pain, diarrhea, nausea and vomiting  Skin: Negative for color change and rash  Allergic/Immunologic: Positive for environmental allergies  Neurological: Positive for headaches  Psychiatric/Behavioral: Positive for sleep disturbance  From Mucinex         Objective:      /76 (BP Location: Left arm, Patient Position: Sitting, Cuff Size: Standard)   Pulse 92   Temp 98 7 °F (37 1 °C) (Temporal)   Resp 16   Ht 5' 4" (1 626 m)   Wt 56 7 kg (125 lb)   SpO2 99%   BMI 21 46 kg/m²          Physical Exam  Vitals reviewed  Constitutional:       General: She is not in acute distress  Appearance: Normal appearance  She is not ill-appearing, toxic-appearing or diaphoretic  HENT:      Head:      Comments: Mild pain over the maxillary sinus     Ears:      Comments: Mild erythema on bilateral tympanic membrane  Cardiovascular:      Rate and Rhythm: Normal rate  Pulses: Normal pulses  Heart sounds: Normal heart sounds  No murmur heard  Pulmonary:      Effort: Pulmonary effort is normal    Abdominal:      General: Abdomen is flat  There is no distension  Palpations: Abdomen is soft  Musculoskeletal:         General: No swelling, tenderness, deformity or signs of injury  Normal range of motion  Skin:     General: Skin is warm and dry  Capillary Refill: Capillary refill takes less than 2 seconds  Coloration: Skin is not jaundiced  Neurological:      General: No focal deficit present  Mental Status: She is alert and oriented to person, place, and time     Psychiatric:         Mood and Affect: Mood normal            Anand Perez CAROL ANN Sanders    Please excuse any "sound-alike" errors that may have ocurred during the process of dictation  Parts of this note have been dictated and there may be errors present in the transcription process  Thank you

## 2022-04-15 NOTE — ASSESSMENT & PLAN NOTE
Unspecified viral upper respiratory symptoms  There is some evidence otitis media as well as sinusitis  After discussion, will start patient with [facility] for 5 days for treatment  Please monitor symptoms, if he noticed worsening respiratory symptoms, avoid close contacts    Please maintain appropriate social distancing, wear a mask at all public spaces, wash hands frequently and clean surface after use  If you have fever greater than 104° that does not respond to Tylenol, difficulty eating or drinking, difficulty breathing please report to ER for evaluation

## 2022-06-23 DIAGNOSIS — N76.0 ACUTE VAGINITIS: Primary | ICD-10-CM

## 2022-06-23 RX ORDER — FLUCONAZOLE 150 MG/1
150 TABLET ORAL ONCE
Qty: 2 TABLET | Refills: 1 | Status: SHIPPED | OUTPATIENT
Start: 2022-06-23 | End: 2022-06-23

## 2022-07-27 ENCOUNTER — VBI (OUTPATIENT)
Dept: ADMINISTRATIVE | Facility: OTHER | Age: 47
End: 2022-07-27

## 2022-08-09 ENCOUNTER — ANNUAL EXAM (OUTPATIENT)
Dept: OBGYN CLINIC | Facility: CLINIC | Age: 47
End: 2022-08-09
Payer: COMMERCIAL

## 2022-08-09 VITALS
HEIGHT: 64 IN | SYSTOLIC BLOOD PRESSURE: 124 MMHG | DIASTOLIC BLOOD PRESSURE: 70 MMHG | BODY MASS INDEX: 21.51 KG/M2 | WEIGHT: 126 LBS

## 2022-08-09 DIAGNOSIS — Z01.419 ENCOUNTER FOR ANNUAL ROUTINE GYNECOLOGICAL EXAMINATION: Primary | ICD-10-CM

## 2022-08-09 DIAGNOSIS — Z12.11 COLON CANCER SCREENING: ICD-10-CM

## 2022-08-09 DIAGNOSIS — Z12.31 ENCOUNTER FOR SCREENING MAMMOGRAM FOR MALIGNANT NEOPLASM OF BREAST: ICD-10-CM

## 2022-08-09 PROCEDURE — S0612 ANNUAL GYNECOLOGICAL EXAMINA: HCPCS | Performed by: OBSTETRICS & GYNECOLOGY

## 2022-08-09 PROCEDURE — G0476 HPV COMBO ASSAY CA SCREEN: HCPCS | Performed by: OBSTETRICS & GYNECOLOGY

## 2022-08-09 PROCEDURE — G0145 SCR C/V CYTO,THINLAYER,RESCR: HCPCS | Performed by: OBSTETRICS & GYNECOLOGY

## 2022-08-09 NOTE — PATIENT INSTRUCTIONS
Breast Self Exam for Women   AMBULATORY CARE:   A breast self-exam (BSE)  is a way to check your breasts for lumps and other changes  Regular BSEs can help you know how your breasts normally look and feel  Most breast lumps or changes are not cancer, but you should always have them checked by a healthcare provider  Why you should do a BSE:  Breast cancer is the most common type of cancer in women  Even if you have mammograms, you may still want to do a BSE regularly  If you know how your breasts normally feel and look, it may help you know when to contact your healthcare provider  Mammograms can miss some cancers  You may find a lump during a BSE that did not show up on a mammogram   When you should do a BSE:  If you have periods, you may want to do your BSE 1 week after your period ends  This is the time when your breasts may be the least swollen, lumpy, or tender  You can do regular BSEs even if you are breastfeeding or have breast implants  Call your doctor if:   You find any lumps or changes in your breasts  You have breast pain or fluid coming from your nipples  You have questions or concerns about your condition or care  How to do a BSE:       Look at your breasts in a mirror  Look at the size and shape of each breast and nipple  Check for swelling, lumps, dimpling, scaly skin, or other skin changes  Look for nipple changes, such as a nipple that is painful or beginning to pull inward  Gently squeeze both nipples and check to see if fluid (that is not breast milk) comes out of them  If you find any of these or other breast changes, contact your healthcare provider  Check your breasts while you sit or  the following 3 positions:    [place] your arms down at your sides  Raise your hands and join them behind your head  Put firm pressure with your hands on your hips  Bend slightly forward while you look at your breasts in the mirror  Lie down and feel your breasts    When you lie down, your breast tissue spreads out evenly over your chest  This makes it easier for you to feel for lumps and anything that may not be normal for your breasts  Do a BSE on one breast at a time  Place a small pillow or towel under your left shoulder  Put your left arm behind your head  Use the 3 middle fingers of your right hand  Use your fingertip pads, on the top of your fingers  Your fingertip pad is the most sensitive part of your finger  Use small circles to feel your breast tissue  Use your fingertip pads to make dime-sized, overlapping circles on your breast and armpits  Use light, medium, and firm pressure  First, press lightly  Second, press with medium pressure to feel a little deeper into the breast  Last, use firm pressure to feel deep within your breast     Examine your entire breast area  Examine the breast area from above the breast to below the breast where you feel only ribs  Make small circles with your fingertips, starting in the middle of your armpit  Make circles going up and down the breast area  Continue toward your breast and all the way across it  Examine the area from your armpit all the way over to the middle of your chest (breastbone)  Stop at the middle of your chest     Move the pillow or towel to your right shoulder, and put your right arm behind your head  Use the 3 fingertip pads of your left hand, and repeat the above steps to do a BSE on your right breast     What else you can do to check for breast problems or cancer:  Talk to your healthcare provider about mammograms  A mammogram is an x-ray of your breasts to screen for breast cancer or other problems  Your provider can tell you the benefits and risks of mammograms  The first mammogram is usually at age 39 or 48  Your provider may recommend you start at 36 or younger if your risk for breast cancer is high  Mammograms usually continue every 1 to 2 years until age 76         Follow up with your doctor as directed:  Write down your questions so you remember to ask them during your visits  © Copyright [employer] 2022 Information is for End User's use only and may not be sold, redistributed or otherwise used for commercial purposes  All illustrations and images included in CareNotes® are the copyrighted property of A D A M , Inc  or Zoraida Scott  The above information is an  only  It is not intended as medical advice for individual conditions or treatments  Talk to your doctor, nurse or pharmacist before following any medical regimen to see if it is safe and effective for you  Wellness Visit for Adults   AMBULATORY CARE:   A wellness visit  is when you see your healthcare provider to get screened for health problems  Your healthcare provider will also give you advice on how to stay healthy  Write down your questions so you remember to ask them  Ask your healthcare provider how often you should have a wellness visit  What happens at a wellness visit:  Your healthcare provider will ask about your health, and your family history of health problems  This includes high blood pressure, heart disease, and cancer  He or she will ask if you have symptoms that concern you, if you smoke, and about your mood  You may also be asked about your intake of medicines, supplements, food, and alcohol  Any of the following may be done: Your weight  will be checked  Your height may also be checked so your body mass index (BMI) can be calculated  Your BMI shows if you are at a healthy weight  Your blood pressure  and heart rate will be checked  Your temperature may also be checked  Blood and urine tests  may be done  Blood tests may be done to check your cholesterol levels  Abnormal cholesterol levels increase your risk for heart disease and stroke  You may also need a blood or urine test to check for diabetes if you are at increased risk  Urine tests may be done to look for signs of an infection or kidney disease      A physical exam  includes checking your heartbeat and lungs with a stethoscope  Your healthcare provider may also check your skin to look for sun damage  Screening tests  may be recommended  A screening test is done to check for diseases that may not cause symptoms  The screening tests you may need depend on your age, gender, family history, and lifestyle habits  For example, colorectal screening may be recommended if you are 48years old or older  Screening tests you need if you are a woman:   A Pap smear  is used to screen for cervical cancer  Pap smears are usually done every 3 to 5 years depending on your age  You may need them more often if you have had abnormal Pap smear test results in the past  Ask your healthcare provider how often you should have a Pap smear  A mammogram  is an x-ray of your breasts to screen for breast cancer  Experts recommend mammograms every 2 years starting at age 48 years  You may need a mammogram at age 52 years or younger if you have an increased risk for breast cancer  Talk to your healthcare provider about when you should start having mammograms and how often you need them  Vaccines you may need:   Get an influenza vaccine  every year  The influenza vaccine protects you from the flu  Several types of viruses cause the flu  The viruses change over time, so new vaccines are made each year  Get a tetanus-diphtheria (Td) booster vaccine  every 10 years  This vaccine protects you against tetanus and diphtheria  Tetanus is a severe infection that may cause painful muscle spasms and lockjaw  Diphtheria is a severe bacterial infection that causes a thick covering in the back of your mouth and throat  Get a human papillomavirus (HPV) vaccine  if you are female and aged 23 to 32 or male 23 to 24 and never received it  This vaccine protects you from HPV infection  HPV is the most common infection spread by sexual contact   HPV may also cause vaginal, penile, and anal cancers  Get a pneumococcal vaccine  if you are aged 72 years or older  The pneumococcal vaccine is an injection given to protect you from pneumococcal disease  Pneumococcal disease is an infection caused by pneumococcal bacteria  The infection may cause pneumonia, meningitis, or an ear infection  Get a shingles vaccine  if you are 60 or older, even if you have had shingles before  The shingles vaccine is an injection to protect you from the varicella-zoster virus  This is the same virus that causes chickenpox  Shingles is a painful rash that develops in people who had chickenpox or have been exposed to the virus  How to eat healthy:  My Plate is a model for planning healthy meals  It shows the types and amounts of foods that should go on your plate  Fruits and vegetables make up about half of your plate, and grains and protein make up the other half  A serving of dairy is included on the side of your plate  The amount of calories and serving sizes you need depends on your age, gender, weight, and height  Examples of healthy foods are listed below:  Eat a variety of vegetables  such as dark green, red, and orange vegetables  You can also include canned vegetables low in sodium (salt) and frozen vegetables without added butter or sauces  Eat a variety of fresh fruits , canned fruit in 100% juice, frozen fruit, and dried fruit  Include whole grains  At least half of the grains you eat should be whole grains  Examples include whole-wheat bread, wheat pasta, brown rice, and whole-grain cereals such as oatmeal     Eat a variety of protein foods such as seafood (fish and shellfish), lean meat, and poultry without skin (turkey and chicken)  Examples of lean meats include pork leg, shoulder, or tenderloin, and beef round, sirloin, tenderloin, and extra lean ground beef  Other protein foods include eggs and egg substitutes, beans, peas, soy products, nuts, and seeds      Choose low-fat dairy products such as skim or 1% milk or low-fat yogurt, cheese, and cottage cheese  Limit unhealthy fats  such as butter, hard margarine, and shortening  Exercise:  Exercise at least 30 minutes per day on most days of the week  Some examples of exercise include walking, biking, dancing, and swimming  You can also fit in more physical activity by taking the stairs instead of the elevator or parking farther away from stores  Include muscle strengthening activities 2 days each week  Regular exercise provides many health benefits  It helps you manage your weight, and decreases your risk for type 2 diabetes, heart disease, stroke, and high blood pressure  Exercise can also help improve your mood  Ask your healthcare provider about the best exercise plan for you  General health and safety guidelines:   Do not smoke  Nicotine and other chemicals in cigarettes and cigars can cause lung damage  Ask your healthcare provider for information if you currently smoke and need help to quit  E-cigarettes or smokeless tobacco still contain nicotine  Talk to your healthcare provider before you use these products  Limit alcohol  A drink of alcohol is 12 ounces of beer, 5 ounces of wine, or 1½ ounces of liquor  Lose weight, if needed  Being overweight increases your risk of certain health conditions  These include heart disease, high blood pressure, type 2 diabetes, and certain types of cancer  Protect your skin  Do not sunbathe or use tanning beds  Use sunscreen with a SPF 15 or higher  Apply sunscreen at least 15 minutes before you go outside  Reapply sunscreen every 2 hours  Wear protective clothing, hats, and sunglasses when you are outside  Drive safely  Always wear your seatbelt  Make sure everyone in your car wears a seatbelt  A seatbelt can save your life if you are in an accident  Do not use your cell phone when you are driving  This could distract you and cause an accident   Pull over if you need to make a call or send a text message  Practice safe sex  Use latex condoms if are sexually active and have more than one partner  Your healthcare provider may recommend screening tests for sexually transmitted infections (STIs)  Wear helmets, lifejackets, and protective gear  Always wear a helmet when you ride a bike or motorcycle, go skiing, or play sports that could cause a head injury  Wear protective equipment when you play sports  Wear a lifejacket when you are on a boat or doing water sports  © Copyright [employer] 2022 Information is for End User's use only and may not be sold, redistributed or otherwise used for commercial purposes  All illustrations and images included in CareNotes® are the copyrighted property of A D A M , Inc  or [facility] Georges Aguilera   The above information is an  only  It is not intended as medical advice for individual conditions or treatments  Talk to your doctor, nurse or pharmacist before following any medical regimen to see if it is safe and effective for you  Perineal Hygiene     No soaps or feminine wash to the vulva  Use only water to cleanse, or water with Dove or [employer] if necessary  No lotion to the area  Use only coconut oil for moisture if needed   No douching     [place] underware, loose fitting clothing  Only perfume-free, dye-free laundry detergent, use a second rinse cycle   Avoid fabric softeners/dryer sheets  Coconut oil as a lubricant (if not using condoms) or another scent-free lubricant (Astroglide, Uberlube) if needed  Partner to avoid the same products as well  Over the counter probiotic to restore vaginal ingrid may be helpful as well     You may also look into Boric Acid vaginal suppositories to restore vaginal PH balance for up to 2 weeks as directed on the box  You may not use these if you are pregnant Kegel Exercises for Women   AMBULATORY CARE:   Kegel exercises  help strengthen your pelvic muscles   Pelvic muscles hold your pelvic organs, such as your bladder and uterus, in place  Kegel exercises help prevent or control problems with urine incontinence (leakage)  Incontinence may be caused by pregnancy, childbirth, or menopause  Contact your healthcare provider if:   You cannot feel your muscles tighten or relax  You continue to leak urine  You have questions or concerns about your condition or care  Use the correct muscles:  Pelvic muscles are the muscles you use to control urine flow  To target these muscles, stop and start the flow of urine several times  This will help you become familiar with how it feels to tighten and relax these muscles  How to do Kegel exercises:   Empty your bladder  You may lie down, stand up, or sit down to do these exercises  When you first try to do these exercises, it may be easier if you lie down  Tighten or squeeze your pelvic muscles slowly  It may feel like you are trying to hold back urine or gas  Hold this position for 3 seconds  Relax for 3 seconds  Repeat this cycle 10 times  Do 10 sets of Kegel exercises, at least 3 times a day  Do not hold your breath when you do Kegel exercises  Keep your stomach, back, and leg muscles relaxed  As your muscles get stronger, you will be able to hold the squeeze longer  Your healthcare provider may ask that you increase your pelvic muscle squeeze to 10 seconds  After you squeeze for 10 seconds, relax for 10 seconds  What else you should know:   Once you know how to do Kegel exercises, use different positions  You can do these exercises while you lie on the floor, sit at your desk or watch TV, and while you stand  You may notice improved bladder control within about 6 weeks  Tighten your pelvic muscles before you sneeze, cough, or lift to prevent urine leakage  Follow up with your doctor as directed:  Write down your questions so you remember to ask them during your visits     © Copyright [employer] 2022 Information is for End User's use only and may not be sold, redistributed or otherwise used for commercial purposes  All illustrations and images included in CareNotes® are the copyrighted property of A D A M , Inc  or Zoraida Scott  The above information is an  only  It is not intended as medical advice for individual conditions or treatments  Talk to your doctor, nurse or pharmacist before following any medical regimen to see if it is safe and effective for you

## 2022-08-09 NOTE — PROGRESS NOTES
[id number]  LMP:   PMB:  SA:  HPV: Not on file   Birth control:  Last pap: 02/27/2018 Negative HPV Negative   Last mammo: 03/16/2021:  Last colonoscopy: Not on file  Last Dexa: Not on file  Family History: No cancer in the family history

## 2022-08-09 NOTE — PROGRESS NOTES
Diagnoses and all orders for this visit:    Encounter for annual routine gynecological examination  -     Liquid-based pap, screening    Encounter for screening mammogram for malignant neoplasm of breast  -     Mammo screening bilateral w 3d & cad; Future    Colon cancer screening  -     Ambulatory referral to Gastroenterology; Future        Health Maintenance:    Last PAP:  2/27/2018 Neg/Neg  Next PAP Due: collected today     Last Mammogram: 03/16/2021  advised age 38  Next Mammogram: order given    Last Colonoscopy: Not on file    advised age 41    Gardisil:  Not completed     Perineal hygiene reviewed   Weight bearing exercises minium of 150 mins/weekly advised  Kegel exercises recommended  SBE encouraged, ASCCP guidelines reviewed  Condoms encouraged with all sexual activity to prevent STI's  Gardisil vaccines recommended up to age 39  Calcium/ Vit D dietary requirements discussed,   Advised to call with any issues,  all concerns & questions addressed  See provided information in your after visit summary     F/U Annually and PRN      Subjective    CC: Yearly Exam      Cecy Montes is a 42 y o  female here for an annual exam  [id number]  GYN hx includes: breast implants 5/2021, ablation, Tubal, Bartholin's cyst , hysteroscopy, endometrial biopsy, salpingectomy abnormal uterine bleeding,   1 vaginal delivery    No personal Hx of breast, cervical, ovarian or colon CA  Family hx of:  No GYN cancer  GGF with colon   Medically stable, reports no changes in medical Hx, follows with PMD    No LMP recorded  Patient has had an ablation  Her menstrual cycles are absent  She denies issues with bleeding   Reports history of abnormal pap smear when she was younger, early 19's, biopsy Neg, normals since  She denies breast concerns, abnormal vaginal discharge, vaginal itching, odor, irritation, bowel/bladder dysfunction, urinary symptoms, pelvic pain, or dyspareunia today  She is not sexually active    Her  recently   Her current method of contraception includes none  She does not want STD testing today  Past Medical History:   Diagnosis Date    Internal derangement of knee joint, left     resolved 10/04/2017    Lyme disease     Varicella      Past Surgical History:   Procedure Laterality Date    BREAST SURGERY      ID HYSTEROSCOPY,W/ENDO BX N/A 2020    Procedure: DILATATION AND CURETTAGE (D&C) WITH HYSTEROSCOPY;  Surgeon: Isis Aggarwal MD;  Location: [facility];  Service: Gynecology    ID HYSTEROSCOPY,W/ENDOMETRIAL ABLATION N/A 2020    Procedure: ABLATION ENDOMETRIAL [facility];  Surgeon: Isis Aggarwal MD;  [facility]: [facility];  Service: Gynecology    ID LAP,RMV  ADNEXAL STRUCTURE Bilateral 2020    Procedure: SALPINGECTOMY, LAPAROSCOPIC BILATERAL;  Surgeon: Isis Aggarwal MD;  [facility];  Service: Gynecology    ID REMOVE INTRAUTERINE DEVICE N/A 2020    Procedure: REMOVAL OF INTRAUTERINE DEVICE (IUD); Surgeon: Isis Aggarwal MD;  [facility];  Service: Gynecology    WISDOM TOOTH EXTRACTION         Immunization History   Administered Date(s) Administered    COVID-19 J&J ([employer]) vaccine 0 5 mL 04/10/2021    COVID-19 MODERNA VACC 0 5 ML IM 2022    Influenza, injectable, quadrivalent, preservative free 0 5 mL 10/29/2019       Family History   Problem Relation Age of Onset    Thyroid disease Mother     Thyroid disease Sister     Heart disease Maternal Grandmother         cardiac disorder    Thyroid disease Maternal Grandmother     No Known Problems Paternal Grandmother     No Known Problems Maternal Aunt      Social History     Tobacco Use    Smoking status: Current Every Day Smoker     Packs/day: 1 00     Years: 20 00     Pack years: 20 00     Types: Cigarettes    Smokeless tobacco: Never Used   Vaping Use    Vaping Use: Some days    Substances: Nicotine, Flavoring   Substance Use Topics    Alcohol use:  Yes Comment: socially    Drug use: No       Current Outpatient Medications:     Acetaminophen (TYLENOL PO), Take by mouth, Disp: , Rfl:     Ascorbic Acid (VITAMIN C PO), Take by mouth, Disp: , Rfl:     Cholecalciferol (VITAMIN D3) 1000 units CAPS, Daily, Disp: , Rfl:     ibuprofen (MOTRIN) 600 mg tablet, Take 1 tablet (600 mg total) by mouth every 6 (six) hours as needed for moderate pain, Disp: 30 tablet, Rfl: 0    MAGNESIUM PO, Take by mouth, Disp: , Rfl:     Multiple Vitamin (MULTIVITAMIN) tablet, Take 1 tablet by mouth daily, Disp: , Rfl:     Multiple Vitamins-Minerals (AIRBORNE PO), Take by mouth, Disp: , Rfl:   Patient Active Problem List    Diagnosis Date Noted    Viral upper respiratory tract infection 04/15/2022    Bartholin cyst 2021    Encounter for gynecological examination (general) (routine) without abnormal findings 2020    Carpal tunnel syndrome of left wrist     Tobacco dependence 2018    Atypical chest pain 10/04/2017    Decreased libido without sexual dysfunction 10/04/2017    Esophageal reflux 2014    Migraine headache 2012       Allergies   Allergen Reactions    Penicillins      Childhood allergy       OB History    Para Term  AB Living   4 2 1 1 2 1   SAB IAB Ectopic Multiple Live Births           1      # Outcome Date GA Lbr Albino/2nd Weight Sex Delivery Anes PTL Lv   4   36w0d   M Vag-Spont   KIM   3 Term            2 AB            1 AB                Vitals:    22 1650   BP: 124/70   BP Location: Left arm   Patient Position: Sitting   Cuff Size: Standard   Weight: 57 2 kg (126 lb)   Height: 5' 4" (1 626 m)     Body mass index is 21 63 kg/m²  Review of Systems     Constitutional: Negative for chills, fatigue, fever, headaches, visual disturbances, and unexpected weight change  Respiratory: Negative for cough, & shortness of breath  Cardiovascular: Negative for chest pain        Gastrointestinal: Negative for Abd pain, nausea & vomiting, constipation and diarrhea  Genitourinary: Negative for difficulty urinating, dysuria, hematuria, dyspareunia, unusual vaginal bleeding or discharge  Skin: Negative skin changes    Physical Exam     Constitutional: Alert & Oriented x3, well-developed and well-nourished  No distress  HENT: Atraumatic, Normocephalic, Conjunctivae clear  Neck: Normal range of motion  Neck supple  No thyromegaly, mass, nodules or tenderness  Pulmonary: Effort normal  Lungs clear to ascultation bilateral  Cardiac: RRR, no murmur   Abdominal: Soft  No tenderness or masses  Musculoskeletal: Normal ROM  Skin: Warm & Dry  Psychological: Normal mood, thought content, behavior & judgement     Breasts:  Bilateral breast implants  Right: tissue soft without masses, tenderness, skin changes or nipple discharge  No areas of erythema or pain  No subclavicular, axillary, pectoral adenopathy  Left:  tissue soft without masses, tenderness, skin changes or nipple discharge  No areas of erythema or pain  No subclavicular, axillary, pectoral adenopathy    Pelvic exam was performed with patient supine, lithotomy position  Labia: Negative rash, tenderness, lesion or injury on the right labia  Negative rash, tenderness, lesion or injury on the left labia  Urethral meatus:  Negative for  tenderness, inflammation or discharge  Uterus: not deviated, enlarged, fixed or tender  Cervix: No CMT, no discharge or friability  Right adnexa: no mass, no tenderness and no fullness  Left adnexa: no mass, no tenderness and no fullness  Vagina: No erythema, tenderness, masses, or foreign body in the vagina  No signs of injury around the vagina  No unusual vaginal discharge   Perineum without lesions, signs of injury, erythema or swelling  Inguinal Canal:        Right: No inguinal adenopathy or hernia present  Left: No inguinal adenopathy or hernia present

## 2022-08-10 LAB
HPV HR 12 DNA CVX QL NAA+PROBE: NEGATIVE
HPV16 DNA CVX QL NAA+PROBE: NEGATIVE
HPV18 DNA CVX QL NAA+PROBE: NEGATIVE

## 2022-08-14 LAB
LAB AP GYN PRIMARY INTERPRETATION: NORMAL
Lab: NORMAL
PATH INTERP SPEC-IMP: NORMAL

## 2022-08-19 DIAGNOSIS — B96.89 BV (BACTERIAL VAGINOSIS): Primary | ICD-10-CM

## 2022-08-19 DIAGNOSIS — N76.0 BV (BACTERIAL VAGINOSIS): Primary | ICD-10-CM

## 2022-08-19 RX ORDER — METRONIDAZOLE 500 MG/1
500 TABLET ORAL EVERY 12 HOURS SCHEDULED
Qty: 10 TABLET | Refills: 0 | Status: SHIPPED | OUTPATIENT
Start: 2022-08-19 | End: 2022-08-24

## 2022-08-19 NOTE — TELEPHONE ENCOUNTER
----- Message from Gregory Zhou sent at 8/19/2022 11:42 AM EDT -----  Regarding: Question regarding LIQUID-BASED PAP, SCREENING  Good morning [facility] you are well  I received a call earlier this week regarding my test results and was asked about the antibiotics  I think it might be best to take them just in case  I originally said I wasnt having symptoms however I think I might be cautious and take them anyway  Can you call in the medication for me?      Thank you so much   Jeremiah Alva

## 2022-08-22 ENCOUNTER — OFFICE VISIT (OUTPATIENT)
Dept: OBGYN CLINIC | Facility: CLINIC | Age: 47
End: 2022-08-22
Payer: COMMERCIAL

## 2022-08-22 VITALS
BODY MASS INDEX: 21.63 KG/M2 | SYSTOLIC BLOOD PRESSURE: 131 MMHG | HEART RATE: 89 BPM | HEIGHT: 64 IN | DIASTOLIC BLOOD PRESSURE: 80 MMHG

## 2022-08-22 DIAGNOSIS — G57.12 MERALGIA PARAESTHETICA, LEFT: ICD-10-CM

## 2022-08-22 DIAGNOSIS — M25.552 PAIN IN LEFT HIP: Primary | ICD-10-CM

## 2022-08-22 PROCEDURE — 99213 OFFICE O/P EST LOW 20 MIN: CPT | Performed by: ORTHOPAEDIC SURGERY

## 2022-08-22 NOTE — PROGRESS NOTES
Assessment:   Diagnosis ICD-10-CM Associated Orders   1  Pain in left hip  M25 552 XR hip/pelv 2-3 vws left if performed     MRI arthrogram left hip   2  Meralgia paraesthetica, left  G57 12 MRI arthrogram left hip       Plan:    Patient reports today for an initial evaluation of her left hip, known meralgia paraesthetica  Patient may continue use of NSAIDs / tylenol for symptomatic relief  X-ray series taken and reviewed today, 08/22/2022  MRI was ordered for follow-up testing  Patient may follow-up with us once the MRI is complete  The patient was given the opportunity to ask questions  Patient is in understanding and in agreement with this treatment plan  The patient has constant pain along her left hip groin and lateral aspect  There is no evidence of any trochanteric bursitis  An MRI was ordered to look for an occult process  Return back once this is complete  If her condition changes, she will not hesitate to let us know    To do next visit:  Return if symptoms worsen or fail to improve, for Recheck after MRI is complete  The above stated was discussed in layman's terms and the patient expressed understanding  All questions were answered to the patient's satisfaction  Scribe Attestation    I,:  Daphne Chin am acting as a scribe while in the presence of the attending physician :       I,:  Mert Finch, DO personally performed the services described in this documentation    as scribed in my presence :             Subjective:   Claus Kee is a 42 y o  female who presents today for an Initial evaluation of Her left hip due to chronic pain for about one month  Patient is doing well but reports ongoing pain in the anterolateral aspect of her hip and groin  She reports pain when laying on her left side  She reports a burning sensation but denies numbness, tingling, fevers, chills or shortness of breath   She currently takes ibuprofen for pain but she states that it does not take all her pain away  She denies any locking or catching  Review of systems negative unless otherwise specified in HPI  Review of Systems   Constitutional: Negative for chills and fever  HENT: Negative for ear pain and sore throat  Eyes: Negative for pain and visual disturbance  Respiratory: Negative for cough and shortness of breath  Cardiovascular: Negative for chest pain and palpitations  Gastrointestinal: Negative for abdominal pain and vomiting  Genitourinary: Negative for dysuria and hematuria  Musculoskeletal: Negative for arthralgias and back pain  Skin: Negative for color change and rash  Neurological: Negative for seizures and syncope  All other systems reviewed and are negative  Past Medical History:   Diagnosis Date    Internal derangement of knee joint, left     resolved 10/04/2017    Lyme disease     Varicella        Past Surgical History:   Procedure Laterality Date    BREAST SURGERY      [place] HYSTEROSCOPY,W/ENDO BX N/A 1/31/2020    Procedure: DILATATION AND CURETTAGE (D&C) WITH HYSTEROSCOPY;  Surgeon: Aquiles Rodriguez MD;  [facility];  Service: Gynecology    WA HYSTEROSCOPY,W/ENDOMETRIAL ABLATION N/A 1/31/2020    Procedure: ABLATION ENDOMETRIAL Merle Branch;  Surgeon: Aquiles Rodriguez MD;  [facility];  Service: Gynecology    WA LAP,RMV  ADNEXAL STRUCTURE Bilateral 1/31/2020    Procedure: SALPINGECTOMY, LAPAROSCOPIC BILATERAL;  Surgeon: Aquiles Rodriguez MD;  [facility];  Service: Gynecology    [place] REMOVE INTRAUTERINE DEVICE N/A 1/31/2020    Procedure: REMOVAL OF INTRAUTERINE DEVICE (IUD);   Surgeon: Aquiles Rodriguez MD;  [facility];  Service: Gynecology    WISDOM TOOTH EXTRACTION         Family History   Problem Relation Age of Onset    Thyroid disease Mother     Thyroid disease Sister     Heart disease Maternal Grandmother         cardiac disorder    Thyroid disease Maternal Grandmother     No Known Problems Paternal Grandmother     No Known Problems Maternal Aunt        Social History     Occupational History    Occupation:      Comment: at [employer]    Smoking status: Current Every Day Smoker     Packs/day: 1 00     Years: 20 00     Pack years: 20 00     Types: Cigarettes    Smokeless tobacco: Never Used   Vaping Use    Vaping Use: Some days    Substances: Nicotine, Flavoring   Substance and Sexual Activity    Alcohol use: Yes     Comment: socially    Drug use: No    Sexual activity: Not Currently     Partners: Male     Birth control/protection: Female Sterilization     Comment: ablation and tubal         Current Outpatient Medications:     Acetaminophen (TYLENOL PO), Take by mouth, Disp: , Rfl:     Ascorbic Acid (VITAMIN C PO), Take by mouth, Disp: , Rfl:     Cholecalciferol (VITAMIN D3) 1000 units CAPS, Daily, Disp: , Rfl:     ibuprofen (MOTRIN) 600 mg tablet, Take 1 tablet (600 mg total) by mouth every 6 (six) hours as needed for moderate pain, Disp: 30 tablet, Rfl: 0    MAGNESIUM PO, Take by mouth, Disp: , Rfl:     metroNIDAZOLE (FLAGYL) 500 mg tablet, Take 1 tablet (500 mg total) by mouth every 12 (twelve) hours for 5 days, Disp: 10 tablet, Rfl: 0    Multiple Vitamin (MULTIVITAMIN) tablet, Take 1 tablet by mouth daily, Disp: , Rfl:     Multiple Vitamins-Minerals (AIRBORNE PO), Take by mouth, Disp: , Rfl:     Allergies   Allergen Reactions    Penicillins      Childhood allergy          Vitals:    08/22/22 0758   BP: 131/80   Pulse: 89       Objective:    Left Hip -   Patient presents with no anatomical deformity  Ambulates with steady gait pattern  Uses no assistive devices  Nontender over lumbar midline  - over paraspinal musculature  - greater trochanter / trochanteric bursa  - PSIS, - piriformis / glute medius  + Anterior hip joint/ groin  Normal ROM    Knee flexor and extensor mechanism intact  2+ TP and DP pulses with brisk capillary refill to the toes  Sural, saphenous, tibial, superficial and deep peroneal motor and sensory distribution intact  Sensation to light touch     Diagnostics, reviewed and taken today if performed as documented: The attending physician has personally reviewed the pertinent films in PACS and interpretation is as follows:     Left hip X-Ray from 08/22/2022 were reviewed and show:  No fractures or dislocations  Joint space is preserved    Procedures, if performed today:      None performed    Portions of the record may have been created with voice recognition software  Occasional wrong word or "sound a like" substitutions may have occurred due to the inherent limitations of voice recognition software  Read the chart carefully and recognize, using context, where substitutions have occurred

## 2022-08-22 NOTE — PATIENT INSTRUCTIONS
Diagnosis ICD-10-CM Associated Orders   1  Pain in left hip  M25 552 XR hip/pelv 2-3 vws left if performed      No follow-ups on file

## 2022-08-25 ENCOUNTER — TELEPHONE (OUTPATIENT)
Dept: OBGYN CLINIC | Facility: CLINIC | Age: 47
End: 2022-08-25

## 2022-08-25 DIAGNOSIS — M25.552 PAIN IN LEFT HIP: Primary | ICD-10-CM

## 2022-08-25 DIAGNOSIS — G57.12 MERALGIA PARAESTHETICA, LEFT: ICD-10-CM

## 2022-08-25 NOTE — TELEPHONE ENCOUNTER
Spoke to Deyanira Elliott   Told her the PT script was put in the chart  She will set up an appointment to a [facility] closest to her home

## 2022-10-11 PROBLEM — J06.9 VIRAL UPPER RESPIRATORY TRACT INFECTION: Status: RESOLVED | Noted: 2022-04-15 | Resolved: 2022-10-11

## 2022-12-30 ENCOUNTER — HOSPITAL ENCOUNTER (OUTPATIENT)
Dept: RADIOLOGY | Facility: CLINIC | Age: 47
End: 2022-12-30

## 2022-12-30 VITALS — HEIGHT: 63 IN | WEIGHT: 123 LBS | BODY MASS INDEX: 21.79 KG/M2

## 2022-12-30 DIAGNOSIS — Z12.31 ENCOUNTER FOR SCREENING MAMMOGRAM FOR MALIGNANT NEOPLASM OF BREAST: ICD-10-CM

## 2022-12-31 ENCOUNTER — LAB (OUTPATIENT)
Dept: LAB | Facility: HOSPITAL | Age: 47
End: 2022-12-31

## 2022-12-31 ENCOUNTER — APPOINTMENT (OUTPATIENT)
Dept: LAB | Facility: HOSPITAL | Age: 47
End: 2022-12-31

## 2022-12-31 DIAGNOSIS — Z01.818 OTHER SPECIFIED PRE-OPERATIVE EXAMINATION: ICD-10-CM

## 2022-12-31 LAB
ANION GAP SERPL CALCULATED.3IONS-SCNC: 0 MMOL/L (ref 4–13)
BUN SERPL-MCNC: 10 MG/DL (ref 5–25)
CALCIUM SERPL-MCNC: 8.7 MG/DL (ref 8.3–10.1)
CHLORIDE SERPL-SCNC: 107 MMOL/L (ref 96–108)
CO2 SERPL-SCNC: 32 MMOL/L (ref 21–32)
CREAT SERPL-MCNC: 0.79 MG/DL (ref 0.6–1.3)
GFR SERPL CREATININE-BSD FRML MDRD: 90 ML/MIN/1.73SQ M
GLUCOSE P FAST SERPL-MCNC: 77 MG/DL (ref 65–99)
POTASSIUM SERPL-SCNC: 4.1 MMOL/L (ref 3.5–5.3)
SODIUM SERPL-SCNC: 139 MMOL/L (ref 135–147)

## 2023-01-06 LAB
ATRIAL RATE: 76 BPM
P AXIS: 28 DEGREES
PR INTERVAL: 118 MS
QRS AXIS: 30 DEGREES
QRSD INTERVAL: 82 MS
QT INTERVAL: 392 MS
QTC INTERVAL: 441 MS
T WAVE AXIS: 31 DEGREES
VENTRICULAR RATE: 76 BPM

## 2023-03-09 ENCOUNTER — TELEPHONE (OUTPATIENT)
Dept: OBGYN CLINIC | Facility: MEDICAL CENTER | Age: 48
End: 2023-03-09

## 2023-03-09 ENCOUNTER — TELEPHONE (OUTPATIENT)
Dept: OBGYN CLINIC | Facility: CLINIC | Age: 48
End: 2023-03-09

## 2023-03-09 ENCOUNTER — APPOINTMENT (OUTPATIENT)
Dept: LAB | Facility: CLINIC | Age: 48
End: 2023-03-09

## 2023-03-09 DIAGNOSIS — R31.9 URINARY TRACT INFECTION WITH HEMATURIA, SITE UNSPECIFIED: Primary | ICD-10-CM

## 2023-03-09 DIAGNOSIS — R35.0 FREQUENT URINATION: Primary | ICD-10-CM

## 2023-03-09 DIAGNOSIS — R35.0 FREQUENT URINATION: ICD-10-CM

## 2023-03-09 DIAGNOSIS — N39.0 URINARY TRACT INFECTION WITH HEMATURIA, SITE UNSPECIFIED: Primary | ICD-10-CM

## 2023-03-09 LAB
BACTERIA UR QL AUTO: ABNORMAL /HPF
BILIRUB UR QL STRIP: NEGATIVE
BUDDING YEAST: PRESENT
CLARITY UR: ABNORMAL
COLOR UR: ABNORMAL
GLUCOSE UR STRIP-MCNC: NEGATIVE MG/DL
HGB UR QL STRIP.AUTO: NEGATIVE
KETONES UR STRIP-MCNC: NEGATIVE MG/DL
LEUKOCYTE ESTERASE UR QL STRIP: ABNORMAL
NITRITE UR QL STRIP: NEGATIVE
NON-SQ EPI CELLS URNS QL MICRO: ABNORMAL /HPF
PH UR STRIP.AUTO: 7.5 [PH]
PROT UR STRIP-MCNC: NEGATIVE MG/DL
RBC #/AREA URNS AUTO: ABNORMAL /HPF
SP GR UR STRIP.AUTO: 1.01 (ref 1–1.03)
UROBILINOGEN UR STRIP-ACNC: <2 MG/DL
WBC #/AREA URNS AUTO: ABNORMAL /HPF

## 2023-03-09 RX ORDER — NITROFURANTOIN 25; 75 MG/1; MG/1
100 CAPSULE ORAL 2 TIMES DAILY
Qty: 14 CAPSULE | Refills: 0 | Status: SHIPPED | OUTPATIENT
Start: 2023-03-09 | End: 2023-03-16

## 2023-03-09 NOTE — TELEPHONE ENCOUNTER
Pt is calling to see if we can put orders in for her to go to the lab to obtain a urinalysis  Pt states she may have a UTI and is experiencing frequent urination and burning  Please advise

## 2023-03-09 NOTE — TELEPHONE ENCOUNTER
Pt is calling back to discuss lab results from today that are abnormal   Patient questioning whether she needs to start medication or not  Please advise

## 2023-03-09 NOTE — TELEPHONE ENCOUNTER
Patient is having some pelvic pressure  Is wondering if she can get medication to start until culture comes back as the ua was abnormal

## 2023-03-11 LAB — BACTERIA UR CULT: ABNORMAL

## 2023-04-03 ENCOUNTER — VBI (OUTPATIENT)
Dept: ADMINISTRATIVE | Facility: OTHER | Age: 48
End: 2023-04-03

## 2023-04-22 ENCOUNTER — APPOINTMENT (OUTPATIENT)
Dept: LAB | Facility: HOSPITAL | Age: 48
End: 2023-04-22

## 2023-04-22 DIAGNOSIS — R53.83 FATIGUE, UNSPECIFIED TYPE: ICD-10-CM

## 2023-04-22 DIAGNOSIS — N95.1 MENOPAUSAL SYMPTOMS: ICD-10-CM

## 2023-04-22 DIAGNOSIS — R63.5 WEIGHT GAIN: ICD-10-CM

## 2023-04-22 LAB
CHOLEST SERPL-MCNC: 174 MG/DL
ESTRADIOL SERPL-MCNC: 75 PG/ML
FSH SERPL-ACNC: 24.8 MIU/ML
HDLC SERPL-MCNC: 51 MG/DL
LDLC SERPL CALC-MCNC: 102 MG/DL (ref 0–100)
NONHDLC SERPL-MCNC: 123 MG/DL
PROGEST SERPL-MCNC: 2.5 NG/ML
T3FREE SERPL-MCNC: 2.57 PG/ML (ref 2.3–4.2)
T4 FREE SERPL-MCNC: 1.1 NG/DL (ref 0.76–1.46)
TESTOST SERPL-MCNC: 45 NG/DL
TRIGL SERPL-MCNC: 106 MG/DL
TSH SERPL DL<=0.05 MIU/L-ACNC: 1.25 UIU/ML (ref 0.45–4.5)

## 2023-04-23 LAB
DHEA-S SERPL-MCNC: 247 UG/DL (ref 41.2–243.7)
THYROPEROXIDASE AB SERPL-ACNC: 10 IU/ML (ref 0–34)

## 2023-04-25 LAB — ALDOST SERPL-MCNC: 5.9 NG/DL (ref 0–30)

## 2023-05-17 ENCOUNTER — OFFICE VISIT (OUTPATIENT)
Dept: OBGYN CLINIC | Facility: CLINIC | Age: 48
End: 2023-05-17

## 2023-05-17 VITALS — BODY MASS INDEX: 22.92 KG/M2 | SYSTOLIC BLOOD PRESSURE: 120 MMHG | WEIGHT: 129.4 LBS | DIASTOLIC BLOOD PRESSURE: 64 MMHG

## 2023-05-17 DIAGNOSIS — R53.82 CHRONIC FATIGUE: Primary | ICD-10-CM

## 2023-05-18 NOTE — PROGRESS NOTES
"Assessment/Plan:       Diagnoses and all orders for this visit:    Chronic fatigue        1) Review of labs note: a) Ovarian axis: Perimenopausal values FSH, estradiol/PG; b) thyroid: entirely normal;c) Adrenal axis: elevated DHEAS at 247 ( optimal 100-200) indicating acute nature of adrenal stress  Testosterone quite good at 45, aldosterone fine  Cortisol not obtained  2) Discussed stages of \"adrenal fatigue\" or activation, she is in Stage 1  Due to recent and profound stressors  I reminded her that she is still grieving  3) Pillars of adrenal healing reviewed:  A) Mental health  She feels she is healing appropriately, does not desire therapy or other medical therapy  Scheduled to go on a few trips this summer with girlfriends to relax and unwind  Good  B) Nutrition: avoid ketosis, intermittent fasting  She does not eat breakfast due to early nature of job  Discussed options, protein with hard boiled egg, nuts, protein bar, smoothie  Weight gain is really not a huge concern  C) Exercise: regular walking, low impact to start  She has much exercise equipment  Start slow; workout partner to hold accountable? D) Sleep: this appears fine  IT is is totally fine to unwind and relax on weekends after being on the go so much  She is grieving appropriately  4) Do not think she needs HRT at this point  5) Offered Cortisol Manager at night to reduce further cortisol production from DHEAS  She will try  Will only need this short term  6) Consider other somatic modalities: massage, accupuncture  7) Follow up prn     This was a 50 minute visit with greater than 50% of time spent in face to face counseling and coordination of care  Subjective:      Patient ID: Jesus Hartley is a 41 y o  female  Jessie Weinstein returns for hormone consult  I saw her last month with complaints of intermittent fatigue, especially on weekends when she can hardly get off the couch, occasional hot flash, mild weight gain 5 lbs     S/p endometrial " ablation, so menopausal status uncertain  Stressors include recent death of her   She admits to keeping herself very busy during the week, but when she has a chance to relax on weekends, it  [employer] and feels very tired  I offered ovarian/thyroid/adrenal axis testing with 24 hr salivary cortisol  She ordered the kit but found it too confusing so threw it away  Diet: Bkfst: none, coffee  Lunch: [place] food with shrimp/broccoli, small amt fried rice  Dinner: cream cheese and crackers  Sometimes cereal with yogurt, berries  Does not cook often as she lives by herself  Review of Systems   Constitutional: Positive for fatigue and unexpected weight change  Gastrointestinal: Negative  Endocrine: Positive for heat intolerance  Genitourinary: Negative  Musculoskeletal: Negative  Psychiatric/Behavioral: Negative for dysphoric mood and sleep disturbance  Objective:      /64 (BP Location: Left arm, Patient Position: Sitting, Cuff Size: Standard)   Wt 58 7 kg (129 lb 6 4 oz)   LMP  (LMP Unknown)   BMI 22 92 kg/m²          Physical Exam  Constitutional:       Appearance: Normal appearance  Neurological:      Mental Status: She is alert

## 2023-05-23 ENCOUNTER — VBI (OUTPATIENT)
Dept: ADMINISTRATIVE | Facility: OTHER | Age: 48
End: 2023-05-23

## 2023-05-31 ENCOUNTER — OFFICE VISIT (OUTPATIENT)
Dept: URGENT CARE | Facility: CLINIC | Age: 48
End: 2023-05-31

## 2023-05-31 VITALS
SYSTOLIC BLOOD PRESSURE: 126 MMHG | RESPIRATION RATE: 18 BRPM | TEMPERATURE: 98.7 F | HEART RATE: 94 BPM | DIASTOLIC BLOOD PRESSURE: 74 MMHG | OXYGEN SATURATION: 98 %

## 2023-05-31 DIAGNOSIS — J01.00 ACUTE NON-RECURRENT MAXILLARY SINUSITIS: Primary | ICD-10-CM

## 2023-05-31 RX ORDER — AZITHROMYCIN 250 MG/1
TABLET, FILM COATED ORAL
Qty: 6 TABLET | Refills: 0 | Status: SHIPPED | OUTPATIENT
Start: 2023-05-31 | End: 2023-06-04

## 2023-05-31 NOTE — PROGRESS NOTES
[facility]        NAME: Aaron Schwartz is a 41 y o  female  : 1975    MRN: [id number]  DATE: May 31, 2023  TIME: 7:13 PM    Assessment and Plan   Acute non-recurrent maxillary sinusitis [J01 00]  1  Acute non-recurrent maxillary sinusitis  azithromycin (ZITHROMAX) 250 mg tablet        Acute symptomatic not responding to conservative treatments  Associated with sinus pressure/pain, pnd, st, cough  Denies fever, sob/chest pain  Educated that typically 90% sinusitis is viral in origin- no need for antibiotic until reach 7-10 day chasidy  Will recommend increase fluids, nasal saline rinses, vit c, otc sinus medication, and will provide zpack if no improvement by this weekend  Educated on s/e and proper use  F/u with pcp with no improvement or worsening  Patient Instructions       Follow up with PCP in 3-5 days  Proceed to  ER if symptoms worsen  Chief Complaint     Chief Complaint   Patient presents with   • Nasal Congestion         History of Present Illness       Patient arrives with c/o sinus pressure/pain, pnd, st, bilateral eye pain and bilat ear pain, and cough  Denies sob, chest pain, fever  Has tried otc medication w/o relief  Review of Systems   Review of Systems   Constitutional: Negative for activity change, appetite change, chills, fatigue and fever  HENT: Positive for congestion, ear pain (bilat), postnasal drip, sinus pressure, sinus pain and sore throat  Negative for rhinorrhea and sneezing  Eyes: Negative for visual disturbance  Respiratory: Positive for cough  Negative for chest tightness, shortness of breath and wheezing  Cardiovascular: Negative for chest pain and palpitations  Gastrointestinal: Negative for abdominal pain, constipation, diarrhea, nausea and vomiting  Musculoskeletal: Negative for arthralgias and myalgias  Skin: Negative for color change, pallor and rash  Neurological: Positive for headaches   Negative for dizziness, weakness and light-headedness  Hematological: Negative for adenopathy  Psychiatric/Behavioral: Negative for agitation and confusion           Current Medications       Current Outpatient Medications:   •  Acetaminophen (TYLENOL PO), Take by mouth, Disp: , Rfl:   •  Ascorbic Acid (VITAMIN C PO), Take by mouth, Disp: , Rfl:   •  azithromycin (ZITHROMAX) 250 mg tablet, Take 2 tablets today then 1 tablet daily x 4 days, Disp: 6 tablet, Rfl: 0  •  Cholecalciferol (VITAMIN D3) 1000 units CAPS, Daily, Disp: , Rfl:   •  Cyanocobalamin (VITAMIN B-12 PO), Take by mouth, Disp: , Rfl:   •  ibuprofen (MOTRIN) 600 mg tablet, Take 1 tablet (600 mg total) by mouth every 6 (six) hours as needed for moderate pain, Disp: 30 tablet, Rfl: 0  •  MAGNESIUM PO, Take by mouth, Disp: , Rfl:   •  Multiple Vitamin (MULTIVITAMIN) tablet, Take 1 tablet by mouth daily, Disp: , Rfl:   •  Multiple Vitamins-Minerals (AIRBORNE PO), Take by mouth, Disp: , Rfl:     Current Allergies     Allergies as of 05/31/2023 - Reviewed 05/31/2023   Allergen Reaction Noted   • Penicillins  06/11/2012            The following portions of the patient's history were reviewed and updated as appropriate: allergies, current medications, past family history, past medical history, past social history, past surgical history and problem list      Past Medical History:   Diagnosis Date   • Internal derangement of knee joint, left     resolved 10/04/2017   • Lyme disease    • Varicella        Past Surgical History:   Procedure Laterality Date   • UT HYSTEROSCOPY BX ENDOMETRIUM&/POLYPC W/WO D&C N/A 01/31/2020    Procedure: DILATATION AND CURETTAGE (D&C) WITH HYSTEROSCOPY;  Surgeon: Ngoc Holloway MD;  [facility];  Service: Gynecology   • UT HYSTEROSCOPY ENDOMETRIAL ABLATION N/A 01/31/2020    Procedure: Liseth Diana;  Surgeon: Ngoc Holloway MD;  [facility];  Service: Gynecology   • UT LAPAROSCOPY W/RMVL ADNEXAL STRUCTURES Bilateral 01/31/2020    Procedure: SALPINGECTOMY, LAPAROSCOPIC BILATERAL;  Surgeon: Ngoc Holloway MD;  [facility];  Service: Gynecology   • ID REMOVAL INTRAUTERINE DEVICE IUD N/A 01/31/2020    Procedure: REMOVAL OF INTRAUTERINE DEVICE (IUD); Surgeon: Ngoc Holloway MD;  [facility];  Service: Gynecology   • WISDOM TOOTH EXTRACTION         Family History   Problem Relation Age of Onset   • Thyroid disease Mother    • Thyroid disease Sister    • Heart disease Maternal Grandmother         cardiac disorder   • Thyroid disease Maternal Grandmother    • No Known Problems Paternal Grandmother    • No Known Problems Maternal Aunt    • Thyroid disease Sister          Medications have been verified  Objective   /74   Pulse 94   Temp 98 7 °F (37 1 °C) (Oral)   Resp 18   LMP  (LMP Unknown)   SpO2 98%   No LMP recorded (lmp unknown)  Patient has had an ablation  Physical Exam     Physical Exam  Vitals and nursing note reviewed  Constitutional:       General: She is not in acute distress  Appearance: Normal appearance  She is not ill-appearing or diaphoretic  HENT:      Head: Normocephalic and atraumatic  Right Ear: Tympanic membrane, ear canal and external ear normal  There is no impacted cerumen  Left Ear: Tympanic membrane, ear canal and external ear normal  There is no impacted cerumen  Nose: Congestion present  No rhinorrhea  Right Sinus: Maxillary sinus tenderness present  Left Sinus: Maxillary sinus tenderness present  Mouth/Throat:      Mouth: Mucous membranes are moist       Pharynx: Oropharynx is clear  Posterior oropharyngeal erythema present  Eyes:      General: No scleral icterus  Right eye: No discharge  Left eye: No discharge  Conjunctiva/sclera: Conjunctivae normal    Cardiovascular:      Rate and Rhythm: Normal rate and regular rhythm     Pulmonary:      Effort: Pulmonary effort is normal  No respiratory distress  Breath sounds: Normal breath sounds  No stridor  No wheezing, rhonchi or rales  Musculoskeletal:         General: Normal range of motion  Cervical back: Normal range of motion  Lymphadenopathy:      Cervical: No cervical adenopathy  Skin:     Coloration: Skin is not jaundiced or pale  Findings: No bruising, erythema or rash  Neurological:      General: No focal deficit present  Mental Status: She is alert and oriented to person, place, and time  Psychiatric:         Mood and Affect: Mood normal          Behavior: Behavior normal          Thought Content:  Thought content normal          Judgment: Judgment normal

## 2023-09-14 ENCOUNTER — APPOINTMENT (EMERGENCY)
Dept: RADIOLOGY | Facility: HOSPITAL | Age: 48
End: 2023-09-14
Payer: COMMERCIAL

## 2023-09-14 ENCOUNTER — HOSPITAL ENCOUNTER (EMERGENCY)
Facility: HOSPITAL | Age: 48
Discharge: HOME | End: 2023-09-14
Attending: EMERGENCY MEDICINE
Payer: COMMERCIAL

## 2023-09-14 VITALS
DIASTOLIC BLOOD PRESSURE: 84 MMHG | TEMPERATURE: 98.4 F | OXYGEN SATURATION: 96 % | SYSTOLIC BLOOD PRESSURE: 167 MMHG | HEART RATE: 93 BPM | RESPIRATION RATE: 18 BRPM

## 2023-09-14 DIAGNOSIS — R00.2 PALPITATIONS: Primary | ICD-10-CM

## 2023-09-14 DIAGNOSIS — R07.9 CHEST PAIN, UNSPECIFIED TYPE: ICD-10-CM

## 2023-09-14 DIAGNOSIS — R51.9 HEADACHE: ICD-10-CM

## 2023-09-14 LAB
ALBUMIN SERPL BCP-MCNC: 4.7 G/DL (ref 3.5–5)
ALP SERPL-CCNC: 72 U/L (ref 34–104)
ALT SERPL W P-5'-P-CCNC: 12 U/L (ref 7–52)
ANION GAP SERPL CALCULATED.3IONS-SCNC: 5 MMOL/L
AST SERPL W P-5'-P-CCNC: 16 U/L (ref 13–39)
BASOPHILS # BLD AUTO: 0.06 THOUSANDS/ÂΜL (ref 0–0.1)
BASOPHILS NFR BLD AUTO: 1 % (ref 0–1)
BILIRUB SERPL-MCNC: 0.44 MG/DL (ref 0.2–1)
BUN SERPL-MCNC: 14 MG/DL (ref 5–25)
CALCIUM SERPL-MCNC: 10.1 MG/DL (ref 8.4–10.2)
CARDIAC TROPONIN I PNL SERPL HS: <2 NG/L
CHLORIDE SERPL-SCNC: 101 MMOL/L (ref 96–108)
CO2 SERPL-SCNC: 31 MMOL/L (ref 21–32)
CREAT SERPL-MCNC: 0.94 MG/DL (ref 0.6–1.3)
EOSINOPHIL # BLD AUTO: 0.21 THOUSAND/ÂΜL (ref 0–0.61)
EOSINOPHIL NFR BLD AUTO: 2 % (ref 0–6)
ERYTHROCYTE [DISTWIDTH] IN BLOOD BY AUTOMATED COUNT: 13.3 % (ref 11.6–15.1)
FLUAV RNA RESP QL NAA+PROBE: NEGATIVE
FLUBV RNA RESP QL NAA+PROBE: NEGATIVE
GFR SERPL CREATININE-BSD FRML MDRD: 73 ML/MIN/1.73SQ M
GLUCOSE SERPL-MCNC: 115 MG/DL (ref 65–140)
HCT VFR BLD AUTO: 44.7 % (ref 34.8–46.1)
HGB BLD-MCNC: 15.3 G/DL (ref 11.5–15.4)
IMM GRANULOCYTES # BLD AUTO: 0.04 THOUSAND/UL (ref 0–0.2)
IMM GRANULOCYTES NFR BLD AUTO: 0 % (ref 0–2)
LYMPHOCYTES # BLD AUTO: 3.68 THOUSANDS/ÂΜL (ref 0.6–4.47)
LYMPHOCYTES NFR BLD AUTO: 37 % (ref 14–44)
MCH RBC QN AUTO: 33.1 PG (ref 26.8–34.3)
MCHC RBC AUTO-ENTMCNC: 34.2 G/DL (ref 31.4–37.4)
MCV RBC AUTO: 97 FL (ref 82–98)
MONOCYTES # BLD AUTO: 0.7 THOUSAND/ÂΜL (ref 0.17–1.22)
MONOCYTES NFR BLD AUTO: 7 % (ref 4–12)
NEUTROPHILS # BLD AUTO: 5.19 THOUSANDS/ÂΜL (ref 1.85–7.62)
NEUTS SEG NFR BLD AUTO: 53 % (ref 43–75)
NRBC BLD AUTO-RTO: 0 /100 WBCS
PLATELET # BLD AUTO: 312 THOUSANDS/UL (ref 149–390)
PMV BLD AUTO: 9.6 FL (ref 8.9–12.7)
POTASSIUM SERPL-SCNC: 3.8 MMOL/L (ref 3.5–5.3)
PROT SERPL-MCNC: 7.5 G/DL (ref 6.4–8.4)
RBC # BLD AUTO: 4.62 MILLION/UL (ref 3.81–5.12)
RSV RNA RESP QL NAA+PROBE: NEGATIVE
SARS-COV-2 RNA RESP QL NAA+PROBE: NEGATIVE
SODIUM SERPL-SCNC: 137 MMOL/L (ref 135–147)
TSH SERPL DL<=0.05 MIU/L-ACNC: 0.93 UIU/ML (ref 0.45–4.5)
WBC # BLD AUTO: 9.88 THOUSAND/UL (ref 4.31–10.16)

## 2023-09-14 PROCEDURE — 84484 ASSAY OF TROPONIN QUANT: CPT

## 2023-09-14 PROCEDURE — 84443 ASSAY THYROID STIM HORMONE: CPT

## 2023-09-14 PROCEDURE — 99285 EMERGENCY DEPT VISIT HI MDM: CPT | Performed by: EMERGENCY MEDICINE

## 2023-09-14 PROCEDURE — 0241U HB NFCT DS VIR RESP RNA 4 TRGT: CPT

## 2023-09-14 PROCEDURE — 93005 ELECTROCARDIOGRAM TRACING: CPT

## 2023-09-14 PROCEDURE — 36415 COLL VENOUS BLD VENIPUNCTURE: CPT

## 2023-09-14 PROCEDURE — 80053 COMPREHEN METABOLIC PANEL: CPT

## 2023-09-14 PROCEDURE — 99285 EMERGENCY DEPT VISIT HI MDM: CPT

## 2023-09-14 PROCEDURE — 85025 COMPLETE CBC W/AUTO DIFF WBC: CPT

## 2023-09-14 PROCEDURE — 71046 X-RAY EXAM CHEST 2 VIEWS: CPT

## 2023-09-14 NOTE — ED PROVIDER NOTES
History  Chief Complaint   Patient presents with   • Palpitations     Pt presents with chest pressure on her left side and feels palpitaions since yesterday     71-year-old female with no significant past medical history presents with palpitations. Patient states 7-day history of periodic palpitations, left-sided chest pressure, headache that randomly occur throughout the day without exacerbating factors. Attempted Advil for the headache on previous days with relief of palpitations, chest pressure, and headaches. Patient states today symptoms were worse than normal, started in the morning while she was at work then resolved for couple hours and returned. Previous history of similar symptoms 18 months ago with passing her , associated with increased stress, currently denies being stressed out. Family history of thyroid disease. Denies alcohol use, recreational drug use. Pack-a-day tobacco use since 27years old. Currently no symptoms. Denies fevers, chills, shortness of breath, abdominal pain, nausea, vomiting, weakness, FND, diaphoresis, leg pain, leg swelling, recent surgery, history of cancer, hormone use, history of VTE. Prior to Admission Medications   Prescriptions Last Dose Informant Patient Reported? Taking?    Acetaminophen (TYLENOL PO)  Self Yes No   Sig: Take by mouth   Ascorbic Acid (VITAMIN C PO)  Self Yes No   Sig: Take by mouth   Cholecalciferol (VITAMIN D3) 1000 units CAPS  Self Yes No   Sig: Daily   Cyanocobalamin (VITAMIN B-12 PO)  Self Yes No   Sig: Take by mouth   MAGNESIUM PO  Self Yes No   Sig: Take by mouth   Multiple Vitamin (MULTIVITAMIN) tablet  Self Yes No   Sig: Take 1 tablet by mouth daily   Multiple Vitamins-Minerals (AIRBORNE PO)  Self Yes No   Sig: Take by mouth   ibuprofen (MOTRIN) 600 mg tablet  Self No No   Sig: Take 1 tablet (600 mg total) by mouth every 6 (six) hours as needed for moderate pain      Facility-Administered Medications: None       Past Medical History:   Diagnosis Date   • Internal derangement of knee joint, left     resolved 10/04/2017   • Lyme disease    • Varicella        Past Surgical History:   Procedure Laterality Date   • OK HYSTEROSCOPY BX ENDOMETRIUM&/POLYPC W/WO D&C N/A 01/31/2020    Procedure: DILATATION AND CURETTAGE (D&C) WITH HYSTEROSCOPY;  Surgeon: Aida Duncan MD;  [facility];  Service: Gynecology   • OK HYSTEROSCOPY ENDOMETRIAL ABLATION N/A 01/31/2020    Procedure: Ella Foreign;  Surgeon: Aida Duncan MD;  [facility];  Service: Gynecology   • OK LAPAROSCOPY W/RMVL ADNEXAL STRUCTURES Bilateral 01/31/2020    Procedure: SALPINGECTOMY, LAPAROSCOPIC BILATERAL;  Surgeon: Aida Duncan MD;  [facility];  Service: Gynecology   • OK REMOVAL INTRAUTERINE DEVICE IUD N/A 01/31/2020    Procedure: REMOVAL OF INTRAUTERINE DEVICE (IUD); Surgeon: Aida Duncan MD;  [facility];  Service: Gynecology   • WISDOM TOOTH EXTRACTION         Family History   Problem Relation Age of Onset   • Thyroid disease Mother    • Thyroid disease Sister    • Heart disease Maternal Grandmother         cardiac disorder   • Thyroid disease Maternal Grandmother    • No Known Problems Paternal Grandmother    • No Known Problems Maternal Aunt    • Thyroid disease Sister      I have reviewed and agree with the history as documented.     E-Cigarette/Vaping   • E-Cigarette Use Current Some Day User      E-Cigarette/Vaping Substances   • Nicotine Yes    • THC No    • CBD No    • Flavoring Yes    • Other No    • Unknown No      Social History     Tobacco Use   • Smoking status: Every Day     Packs/day: 0.50     Years: 20.00     Total pack years: 10.00     Types: Cigarettes   • Smokeless tobacco: Never   Vaping Use   • Vaping Use: Some days   • Substances: Nicotine, Flavoring   Substance Use Topics   • Alcohol use: Yes     Comment: socially   • Drug use: No        Review of Systems   All other systems reviewed and are negative. Physical Exam  ED Triage Vitals   Temperature Pulse Respirations Blood Pressure SpO2   09/14/23 1654 09/14/23 1652 09/14/23 1652 09/14/23 1652 09/14/23 1652   98.4 °F (36.9 °C) 97 18 167/84 96 %      Temp Source Heart Rate Source Patient Position - Orthostatic VS BP Location FiO2 (%)   09/14/23 1652 09/14/23 1652 09/14/23 1652 09/14/23 1652 --   Oral Monitor Sitting Right arm       Pain Score       09/14/23 1652       3             Orthostatic Vital Signs  Vitals:    09/14/23 1652 09/14/23 1700   BP: 167/84 167/84   Pulse: 97 93   Patient Position - Orthostatic VS: Sitting        Physical Exam  Vitals and nursing note reviewed. Constitutional:       General: She is not in acute distress. Appearance: Normal appearance. She is normal weight. She is not ill-appearing, toxic-appearing or diaphoretic. HENT:      Head: Normocephalic and atraumatic. Right Ear: External ear normal.      Left Ear: External ear normal.      Nose: Nose normal.      Mouth/Throat:      Mouth: Mucous membranes are moist.      Pharynx: Oropharynx is clear. Eyes:      General: No scleral icterus. Right eye: No discharge. Left eye: No discharge. Extraocular Movements: Extraocular movements intact. Conjunctiva/sclera: Conjunctivae normal.      Pupils: Pupils are equal, round, and reactive to light. Cardiovascular:      Rate and Rhythm: Normal rate and regular rhythm. Pulses: Normal pulses. Heart sounds: Normal heart sounds. No murmur heard. No friction rub. No gallop. Pulmonary:      Effort: Pulmonary effort is normal. No respiratory distress. Breath sounds: Normal breath sounds. No stridor. No wheezing, rhonchi or rales. Chest:      Chest wall: No tenderness. Abdominal:      General: There is no distension. Palpations: Abdomen is soft. There is no mass. Tenderness: There is no abdominal tenderness. There is no guarding or rebound. Hernia: No hernia is present. Musculoskeletal:         General: No swelling, tenderness, deformity or signs of injury. Normal range of motion. Cervical back: Normal range of motion and neck supple. No rigidity or tenderness. Right lower leg: No edema. Left lower leg: No edema. Skin:     General: Skin is warm and dry. Capillary Refill: Capillary refill takes less than 2 seconds. Coloration: Skin is not cyanotic, jaundiced or pale. Findings: No bruising, erythema, lesion, petechiae or rash. Neurological:      General: No focal deficit present. Mental Status: She is alert and oriented to person, place, and time. Mental status is at baseline. Cranial Nerves: No cranial nerve deficit. Gait: Gait normal.      Comments: -Face symmetrical and tongue midline. Normal speech.  -Cranial nerves II-XII intact         ED Medications  Medications - No data to display    Diagnostic Studies  Results Reviewed     Procedure Component Value Units Date/Time    FLU/RSV/COVID - if FLU/RSV clinically relevant [[id number]]  (Normal) Collected: 09/14/23 1714    Lab Status: Final result Specimen: Nares from Nose Updated: 09/14/23 1848     SARS-CoV-2 Negative     INFLUENZA A PCR Negative     INFLUENZA B PCR Negative     RSV PCR Negative    Narrative:      FOR PEDIATRIC PATIENTS - copy/paste COVID Guidelines URL to browser: [email or web address]    SARS-CoV-2 assay is a Nucleic Acid Amplification assay intended for the  qualitative detection of nucleic acid from SARS-CoV-2 in nasopharyngeal  swabs. Results are for the presumptive identification of SARS-CoV-2 RNA. Positive results are indicative of infection with SARS-CoV-2, the virus  causing COVID-19, but do not rule out bacterial infection or co-infection  with other viruses.  Laboratories within the [facility] and its  territories are required to report all positive results to the appropriate  public health authorities. Negative results do not preclude SARS-CoV-2  infection and should not be used as the sole basis for treatment or other  patient management decisions. Negative results must be combined with  clinical observations, patient history, and epidemiological information. This test has not been FDA cleared or approved. This test has been authorized by FDA under an Emergency Use Authorization  (EUA). This test is only authorized for the duration of time the  declaration that circumstances exist justifying the authorization of the  emergency use of an in vitro diagnostic tests for detection of SARS-CoV-2  virus and/or diagnosis of COVID-19 infection under section 564(b)(1) of  the Act, 21 U. S.C. 413SRY-7(B)(6), unless the authorization is terminated  or revoked sooner. The test has been validated but independent review by FDA  and CLIA is pending. Test performed using Cepheid GeneXpert: This RT-PCR assay targets N2,  a region unique to SARS-CoV-2. A conserved region in the E-gene was chosen  for pan-Sarbecovirus detection which includes SARS-CoV-2. According to CMS-2020-01-R, this platform meets the definition of high-throughput technology.     TSH, 3rd generation with Free T4 reflex [[id number]]  (Normal) Collected: 09/14/23 1714    Lab Status: Final result Specimen: Blood from Arm, Left Updated: 09/14/23 1757     TSH 3RD GENERATON 0.934 uIU/mL     HS Troponin 0hr (reflex protocol) [[id number]]  (Normal) Collected: 09/14/23 1714    Lab Status: Final result Specimen: Blood from Arm, Left Updated: 09/14/23 1748     hs TnI 0hr <2 ng/L     Comprehensive metabolic panel [[id number]] Collected: 09/14/23 1714    Lab Status: Final result Specimen: Blood from Arm, Left Updated: 09/14/23 1742     Sodium 137 mmol/L      Potassium 3.8 mmol/L      Chloride 101 mmol/L      CO2 31 mmol/L      ANION GAP 5 mmol/L      BUN 14 mg/dL      Creatinine 0.94 mg/dL      Glucose 115 mg/dL      Calcium 10.1 mg/dL      AST 16 U/L      ALT 12 U/L      Alkaline Phosphatase 72 U/L      Total Protein 7.5 g/dL      Albumin 4.7 g/dL      Total Bilirubin 0.44 mg/dL      eGFR 73 ml/min/1.73sq m     Narrative:      [place] guidelines for Chronic Kidney Disease (CKD):   •  Stage 1 with normal or high GFR (GFR > 90 mL/min/1.73 square meters)  •  Stage 2 Mild CKD (GFR = 60-89 mL/min/1.73 square meters)  •  Stage 3A Moderate CKD (GFR = 45-59 mL/min/1.73 square meters)  •  Stage 3B Moderate CKD (GFR = 30-44 mL/min/1.73 square meters)  •  Stage 4 Severe CKD (GFR = 15-29 mL/min/1.73 square meters)  •  Stage 5 End Stage CKD (GFR <15 mL/min/1.73 square meters)  Note: GFR calculation is accurate only with a steady state creatinine    CBC and differential [[id number]] Collected: 09/14/23 1714    Lab Status: Final result Specimen: Blood from Arm, Left Updated: 09/14/23 1730     WBC 9.88 Thousand/uL      RBC 4.62 Million/uL      Hemoglobin 15.3 g/dL      Hematocrit 44.7 %      MCV 97 fL      MCH 33.1 pg      MCHC 34.2 g/dL      RDW 13.3 %      MPV 9.6 fL      Platelets 866 Thousands/uL      nRBC 0 /100 WBCs      Neutrophils Relative 53 %      Immat GRANS % 0 %      Lymphocytes Relative 37 %      Monocytes Relative 7 %      Eosinophils Relative 2 %      Basophils Relative 1 %      Neutrophils Absolute 5.19 Thousands/µL      Immature Grans Absolute 0.04 Thousand/uL      Lymphocytes Absolute 3.68 Thousands/µL      Monocytes Absolute 0.70 Thousand/µL      Eosinophils Absolute 0.21 Thousand/µL      Basophils Absolute 0.06 Thousands/µL                  XR chest 2 views   ED Interpretation by Tres Gutierres MD (09/14 1758)   No acute cardiopulmonary process.              Procedures  Procedures      ED Course  ED Course as of 09/14/23 2117   Thu Sep 14, 2023   1720 EKG personally interpreted by me, normal sinus rhythm rate of 94, normal CA interval, normal QRS interval, QTc 457, no ST elevations, no ST depressions, no ischemic changes, no delta wave, no long QT, no epsilon wave, no Brugada pattern, when compared to previous EKG on December 31, 2022 no significant changes noted. 1732 CBC and differential  WNL   1758 Labs WNL                     PERC Rule for PE    Flowsheet Row Most Recent Value   PERC Rule for PE    Age >=50 0 Filed at: 09/14/2023 1710   HR >=100 0 Filed at: 09/14/2023 1710   O2 Sat on room air < 95% 0 Filed at: 09/14/2023 1710   History of PE or DVT 0 Filed at: 09/14/2023 1710   Recent trauma or surgery 0 Filed at: 09/14/2023 1710   Hemoptysis 0 Filed at: 09/14/2023 1710   Exogenous estrogen 0 Filed at: 09/14/2023 1710   Unilateral leg swelling 0 Filed at: 09/14/2023 1710   PERC Rule for PE Results 0 Filed at: 09/14/2023 1710                 HEART score:    History 0=Slightly or non-suspicious   ECG 0=Normal   Age 1= > 45 - <65 years   Risk Factors 1= 1 or 2 risk factors   Troponin 0= Less than or equal to 12 ng/L   Total 2                Medical Decision Making  70-year-old female presents with chest pain. Differential include but not limited to ACS, GERD, esophagitis, pneumonia  EKG, labs, chest x-ray ordered. EKG without ischemic changes, abnormal labs, chest x-ray normal, heart score 2, PERC negative. Discussed results with patient. Continued concern for cardiac etiology, referral to cardiology given. Advised to follow-up with primary care physician. Discharged home to self-care with strict return precautions continued or worsening symptoms, signs and symptoms of ACS. Patient understanding and agreement with plan. Amount and/or Complexity of Data Reviewed  Labs: ordered. Decision-making details documented in ED Course. Radiology: ordered and independent interpretation performed.             Disposition  Final diagnoses:   Palpitations   Chest pain, unspecified type   Headache     Time reflects when diagnosis was documented in both MDM as applicable and the Disposition within this note     Time User Action Codes Description Comment    9/14/2023  5:59 PM Jared Priest Add [R00.2] Palpitations     9/14/2023  5:59 PM Jared Priest Add [R07.9] Chest pain, unspecified type     9/14/2023  5:59 PM Jeannette Terry Add [R51.9] Headache       ED Disposition     ED Disposition   Discharge    Condition   Stable    Date/Time   Thu Sep 14, 2023  5:59 PM    Comment   Diogo Richardson discharge to home/self care. Follow-up Information     Follow up With Specialties Details Why Contact Info Additional Information    Gucci Miranda DO Internal Medicine, Family Medicine Schedule an appointment as soon as possible for a visit  As needed [address] [facility] Emergency Department Emergency Medicine Go to  If symptoms worsen [address] Emergency Department, [place], [place], [address] Cardiology Schedule an appointment as soon as possible for a visit  As needed [address] [address]  [address] [place] [address] Cardiology [address] [place], [address] [place], [place], [place]          Discharge Medication List as of 9/14/2023  6:01 PM      CONTINUE these medications which have NOT CHANGED    Details   Acetaminophen (TYLENOL PO) Take by mouth, Historical Med      Ascorbic Acid (VITAMIN C PO) Take by mouth, Historical Med      Cholecalciferol (VITAMIN D3) 1000 units CAPS Daily, Historical Med      Cyanocobalamin (VITAMIN B-12 PO) Take by mouth, Historical Med      ibuprofen (MOTRIN) 600 mg tablet Take 1 tablet (600 mg total) by mouth every 6 (six) hours as needed for moderate pain, Starting Fri 1/31/2020, Normal      MAGNESIUM PO Take by mouth, Historical Med      Multiple Vitamin (MULTIVITAMIN) tablet Take 1 tablet by mouth daily, Historical Med      Multiple Vitamins-Minerals (AIRBORNE PO) Take by mouth, Historical Med               PDMP Review       Value Time User    PDMP Reviewed  Yes 1/31/2020 10:39 AM Jason Mchugh MD           ED Provider  Attending physically available and evaluated Theresa Aguilar. I managed the patient along with the ED Attending.     Electronically Signed by         Jennifer Prater MD  09/14/23 4223

## 2023-09-14 NOTE — Clinical Note
Dahiana Mancuso was seen and treated in our emergency department on 9/14/2023. No restrictions            Diagnosis:     Maricruz Verduzco  . She may return on this date: 09/16/2023         If you have any questions or concerns, please don't hesitate to call.       Karolina Reynoso MD    ______________________________           _______________          _______________  Hospital Representative                              Date                                Time

## 2023-09-14 NOTE — DISCHARGE INSTRUCTIONS
-Please f/u w/ cardiology as needed. Attached is information regarding their service. A referral has also been placed.   -Please return for continued or worsening symptoms, cold sweating, fainting, shortness of breath.

## 2023-09-15 LAB
ATRIAL RATE: 94 BPM
P AXIS: 73 DEGREES
PR INTERVAL: 112 MS
QRS AXIS: 82 DEGREES
QRSD INTERVAL: 78 MS
QT INTERVAL: 366 MS
QTC INTERVAL: 457 MS
T WAVE AXIS: 58 DEGREES
VENTRICULAR RATE: 94 BPM

## 2023-09-15 PROCEDURE — 93010 ELECTROCARDIOGRAM REPORT: CPT | Performed by: INTERNAL MEDICINE

## 2023-09-15 NOTE — ED ATTENDING ATTESTATION
9/14/2023  IOsbaldo MD, saw and evaluated the patient. I have discussed the patient with the resident/non-physician practitioner and agree with the resident's/non-physician practitioner's findings, Plan of Care, and MDM as documented in the resident's/non-physician practitioner's note, except where noted. All available labs and Radiology studies were reviewed. I was present for key portions of any procedure(s) performed by the resident/non-physician practitioner and I was immediately available to provide assistance. At this point I agree with the current assessment done in the Emergency Department.   I have conducted an independent evaluation of this patient a history and physical is as follows:SEE H AND P ABOVE- AGREE WITH ER RESIDENT TX PLAN / DISPO    ED Course  ED Course as of 09/14/23 2128   Thu Sep 14, 2023   1755 Cxr pa/lat- normal mediastinum/cardiac silhouette - no free/sq air- no infiltrate- ptx- pulm edema- pleural effusions   1758 Er md note- 12 lead ecg reviewed by er md - nsr rate of 94- no signs of ischemia/ injury / r heart strain / adry/pericarditis comapred to previous 12/31/22- no sign changes          Critical Care Time  Procedures

## 2023-10-19 ENCOUNTER — TELEPHONE (OUTPATIENT)
Dept: CARDIOLOGY CLINIC | Facility: CLINIC | Age: 48
End: 2023-10-19

## 2023-10-19 NOTE — TELEPHONE ENCOUNTER
Karine called & stated that her  use to be Dr. GUTIERREZ's patient ( Sancho Katherine Calderon ). Karine would like to know if Dr. GUTIERREZ would take her on as a new patient. Referral was sent over for pt to establish with a cardiologist.   .    Karine can be reached at [phone]

## 2023-10-26 ENCOUNTER — RA CDI HCC (OUTPATIENT)
Dept: OTHER | Facility: HOSPITAL | Age: 48
End: 2023-10-26

## 2023-10-26 NOTE — PROGRESS NOTES
[address] [facility] coding opportunities       Chart reviewed, no opportunity found: CHART REVIEWED, NO OPPORTUNITY FOUND        Patients Insurance        Commercial Insurance: Abdelrahman Jacobs

## 2023-10-30 ENCOUNTER — OFFICE VISIT (OUTPATIENT)
Dept: FAMILY MEDICINE CLINIC | Facility: CLINIC | Age: 48
End: 2023-10-30
Payer: COMMERCIAL

## 2023-10-30 VITALS
BODY MASS INDEX: 22.43 KG/M2 | TEMPERATURE: 97.7 F | HEART RATE: 84 BPM | HEIGHT: 63 IN | OXYGEN SATURATION: 99 % | SYSTOLIC BLOOD PRESSURE: 112 MMHG | WEIGHT: 126.6 LBS | DIASTOLIC BLOOD PRESSURE: 72 MMHG

## 2023-10-30 DIAGNOSIS — J01.20 ACUTE NON-RECURRENT ETHMOIDAL SINUSITIS: Primary | ICD-10-CM

## 2023-10-30 DIAGNOSIS — R51.9 ACUTE NONINTRACTABLE HEADACHE, UNSPECIFIED HEADACHE TYPE: ICD-10-CM

## 2023-10-30 PROCEDURE — 99214 OFFICE O/P EST MOD 30 MIN: CPT | Performed by: FAMILY MEDICINE

## 2023-10-30 PROCEDURE — 3725F SCREEN DEPRESSION PERFORMED: CPT | Performed by: FAMILY MEDICINE

## 2023-10-30 RX ORDER — AZITHROMYCIN 250 MG/1
TABLET, FILM COATED ORAL
Qty: 6 TABLET | Refills: 0 | Status: SHIPPED | OUTPATIENT
Start: 2023-10-30 | End: 2023-11-04

## 2023-10-30 RX ORDER — CYCLOBENZAPRINE HCL 5 MG
2.5 TABLET ORAL
Qty: 20 TABLET | Refills: 0 | Status: SHIPPED | OUTPATIENT
Start: 2023-10-30

## 2023-10-30 NOTE — PROGRESS NOTES
Outpatient Progress Note    Assessment/Plan:    Problem List Items Addressed This Visit    None  Visit Diagnoses       Acute non-recurrent ethmoidal sinusitis    -  Primary    Relevant Medications    azithromycin (Zithromax) 250 mg tablet    Acute nonintractable headache, unspecified headache type        Relevant Medications    cyclobenzaprine (FLEXERIL) 5 mg tablet           Experiencing 1 week history of headache  Recent URI symptoms as well as migraine exacerbation  She also did have a recent bout of muscle spasm which she saw chiropractor for  Isolated in the left side, in the left temple and behind the left eye  Episode of pain behind the left eye while driving  Medical diagnosis include cluster headache, temporal arteritis, trigeminal neuralgia, muscle spasm involving the temporalis muscle  We will treat based on sinusitis involving the ethmoid sinus, azithromycin for total 5 days  The same time patient may try half a tablet to a full tablet of Flexeril 5 mg for muscle spasms  Please maintain adequate hydration and monitor his symptoms, if there is no significant improvement with your significant worsening symptoms such as persistent severe headache, headache that wakes you up from sleep or persistent change in vision please report to the ER for evaluation    Disposition:     I have spent a total time of 10 minutes on the day of the encounter for this patient including       Encounter provider: La Carr MD     Provider located at: [address]  [phone]     Recent Visits  No visits were found meeting these conditions.   Showing recent visits within past 7 days and meeting all other requirements  Today's Visits  Date Type Provider Dept   10/30/23 Office Visit La Carr MD  [facility]   Showing today's visits and meeting all other requirements  Future Appointments  No visits were found meeting these conditions. Showing future appointments within next 150 days and meeting all other requirements     Subjective:   Edinson Abdalla is a 41 y.o. female who is concerned about COVID-19. Patient's symptoms include fatigue, nasal congestion, rhinorrhea, sore throat, cough and headache (pressure around the ear on the left). Patient denies fever, chills, malaise, anosmia, loss of taste, shortness of breath, chest tightness, abdominal pain, nausea, vomiting, diarrhea and myalgias. - Date of symptom onset: 10/23/2023      COVID-19 vaccination status: Fully vaccinated with booster    Exposure:   Contact with a person who is under investigation (PUI) for or who is positive for COVID-19 within the last 14 days?: No    Hospitalized recently for fever and/or lower respiratory symptoms?: No      Currently a healthcare worker that is involved in direct patient care?: No      Works in a special setting where the risk of COVID-19 transmission may be high? (this may include long-term care, correctional and correction facilities; homeless shelters; assisted-living facilities and group homes.): No      Resident in a special setting where the risk of COVID-19 transmission may be high? (this may include long-term care, correctional and correction facilities; homeless shelters; assisted-living facilities and group homes.): No      Medication does not seem to improve symptoms  Pressure seems to alleviate symptoms  Baseline cluster headache   Some neck and shoulder pain 2 weeks ago    Lab Results   Component Value Date    SARSCOV2 Negative 09/14/2023    SARSCOV2 Lumiradx Sars-Cov-2 AG Test 04/29/2021       Review of Systems   Constitutional:  Positive for fatigue. Negative for chills and fever. HENT:  Positive for congestion, rhinorrhea and sore throat. Respiratory:  Positive for cough. Negative for chest tightness and shortness of breath. Gastrointestinal:  Negative for abdominal pain, diarrhea, nausea and vomiting.    Musculoskeletal: Negative for myalgias. Neurological:  Positive for headaches (pressure around the ear on the left). Current Outpatient Medications on File Prior to Visit   Medication Sig    Acetaminophen (TYLENOL PO) Take by mouth    Ascorbic Acid (VITAMIN C PO) Take by mouth    Cholecalciferol (VITAMIN D3) 1000 units CAPS Daily    Cyanocobalamin (VITAMIN B-12 PO) Take by mouth    ibuprofen (MOTRIN) 600 mg tablet Take 1 tablet (600 mg total) by mouth every 6 (six) hours as needed for moderate pain    MAGNESIUM PO Take by mouth    Multiple Vitamin (MULTIVITAMIN) tablet Take 1 tablet by mouth daily    Multiple Vitamins-Minerals (AIRBORNE PO) Take by mouth       Objective:    /72 (BP Location: Left arm, Patient Position: Sitting, Cuff Size: Standard)   Pulse 84   Temp 97.7 °F (36.5 °C)   Ht 5' 3" (1.6 m)   Wt 57.4 kg (126 lb 9.6 oz)   SpO2 99%   BMI 22.43 kg/m²      Physical Exam  Vitals reviewed. Constitutional:       General: She is not in acute distress. Appearance: Normal appearance. She is not ill-appearing, toxic-appearing or diaphoretic. HENT:      Head: Normocephalic. Right Ear: Tympanic membrane, ear canal and external ear normal. There is no impacted cerumen. Left Ear: Tympanic membrane, ear canal and external ear normal. There is no impacted cerumen. Cardiovascular:      Heart sounds: No murmur heard. Pulmonary:      Effort: Pulmonary effort is normal. No respiratory distress. Breath sounds: Normal breath sounds. Abdominal:      General: Abdomen is flat. Bowel sounds are normal. There is no distension. Palpations: Abdomen is soft. Musculoskeletal:         General: No swelling, deformity or signs of injury. Comments: No TMJ clicking or tenderness   Skin:     Capillary Refill: Capillary refill takes less than 2 seconds. Neurological:      General: No focal deficit present. Mental Status: She is alert.    Psychiatric:         Mood and Affect: Mood normal. Mary Chairez MD

## 2023-11-01 ENCOUNTER — CONSULT (OUTPATIENT)
Dept: CARDIOLOGY CLINIC | Facility: CLINIC | Age: 48
End: 2023-11-01
Payer: COMMERCIAL

## 2023-11-01 VITALS
RESPIRATION RATE: 16 BRPM | DIASTOLIC BLOOD PRESSURE: 72 MMHG | SYSTOLIC BLOOD PRESSURE: 108 MMHG | WEIGHT: 124 LBS | BODY MASS INDEX: 21.97 KG/M2 | HEIGHT: 63 IN | OXYGEN SATURATION: 97 % | HEART RATE: 93 BPM

## 2023-11-01 DIAGNOSIS — R07.89 CHEST DISCOMFORT: ICD-10-CM

## 2023-11-01 DIAGNOSIS — R00.2 PALPITATIONS: ICD-10-CM

## 2023-11-01 PROCEDURE — 99243 OFF/OP CNSLTJ NEW/EST LOW 30: CPT | Performed by: INTERNAL MEDICINE

## 2023-11-01 NOTE — PROGRESS NOTES
Cardiology Consultation     Ambika Rai  [id number]  1975  [address] [id number]  This patient presents for cardiology consultation and evaluation. Patient has been having symptoms of palpitations on and off for the past few months. Patient also had chest pain and went to the emergency room recently. Patient does not have any history of coronary artery disease or MI in the past.  She also denies any history of hypertension diabetes or hyperlipidemia. Patient does have history of smoking 1 pack/day for the past many years and has a hard time quitting it. She denies any history of presyncope syncope. She plans to be more active after her cardiac evaluation. 1. Palpitations  Ambulatory referral to Cardiology      2.  Chest discomfort  Ambulatory referral to Cardiology        Patient Active Problem List   Diagnosis    Atypical chest pain    Esophageal reflux    Migraine headache    Tobacco dependence    Carpal tunnel syndrome of left wrist    Bartholin cyst     Past Medical History:   Diagnosis Date    Internal derangement of knee joint, left     resolved 10/04/2017    Lyme disease     Varicella      Social History     Socioeconomic History    Marital status: Single     Spouse name: Not on file    Number of children: 1    Years of education: Not on file    Highest education level: Not on file   Occupational History    Occupation:      Comment: at [employer]   Tobacco Use    Smoking status: Every Day     Packs/day: 0.50     Years: 20.00     Total pack years: 10.00     Types: Cigarettes    Smokeless tobacco: Never   Vaping Use    Vaping Use: Some days    Substances: Nicotine, Flavoring   Substance and Sexual Activity    Alcohol use: Yes     Comment: socially    Drug use: No    Sexual activity: Not Currently     Partners: Male     Birth control/protection: Female Sterilization     Comment: ablation and tubal   Other Topics Concern    Not on file   Social History Narrative    Not on file     Social Determinants of Health     Financial Resource Strain: Not on file   Food Insecurity: Not on file   Transportation Needs: Not on file   Physical Activity: Not on file   Stress: Not on file   Social Connections: Not on file   Intimate Partner Violence: Not on file   Housing Stability: Not on file      Family History   Problem Relation Age of Onset    Thyroid disease Mother     Thyroid disease Sister     Heart disease Maternal Grandmother         cardiac disorder    Thyroid disease Maternal Grandmother     No Known Problems Paternal Grandmother     No Known Problems Maternal Aunt     Thyroid disease Sister      Past Surgical History:   Procedure Laterality Date    WI HYSTEROSCOPY BX ENDOMETRIUM&/POLYPC W/WO D&C N/A 01/31/2020    Procedure: DILATATION AND CURETTAGE (D&C) WITH HYSTEROSCOPY;  Surgeon: Devang Cohen MD;  [facility];  Service: Gynecology    WI HYSTEROSCOPY ENDOMETRIAL ABLATION N/A 01/31/2020    Procedure: ABLATION ENDOMETRIAL Francies Channel;  Surgeon: Devang Cohen MD;  [facility];  Service: Gynecology    WI LAPAROSCOPY W/RMVL ADNEXAL STRUCTURES Bilateral 01/31/2020    Procedure: SALPINGECTOMY, LAPAROSCOPIC BILATERAL;  Surgeon: Devang Cohen MD;  [facility];  Service: Gynecology    WI REMOVAL INTRAUTERINE DEVICE IUD N/A 01/31/2020    Procedure: REMOVAL OF INTRAUTERINE DEVICE (IUD); Surgeon: Devang Cohen MD;  [facility];  Service: Gynecology    WISDOM TOOTH EXTRACTION         Current Outpatient Medications:     Acetaminophen (TYLENOL PO), Take by mouth, Disp: , Rfl:     Ascorbic Acid (VITAMIN C PO), Take by mouth, Disp: , Rfl:     azithromycin (Zithromax) 250 mg tablet, Take 2 tablets (500 mg total) by mouth daily for 1 day, THEN 1 tablet (250 mg total) daily for 4 days. , Disp: 6 tablet, Rfl: 0 Cholecalciferol (VITAMIN D3) 1000 units CAPS, Daily, Disp: , Rfl:     Cyanocobalamin (VITAMIN B-12 PO), Take by mouth, Disp: , Rfl:     cyclobenzaprine (FLEXERIL) 5 mg tablet, Take 0.5 tablets (2.5 mg total) by mouth daily at bedtime, Disp: 20 tablet, Rfl: 0    ibuprofen (MOTRIN) 600 mg tablet, Take 1 tablet (600 mg total) by mouth every 6 (six) hours as needed for moderate pain, Disp: 30 tablet, Rfl: 0    MAGNESIUM PO, Take by mouth, Disp: , Rfl:     Misc Natural Products (Cortisol Manager) TABS, Take 1 tablet by mouth every other day, Disp: , Rfl:     Multiple Vitamin (MULTIVITAMIN) tablet, Take 1 tablet by mouth daily, Disp: , Rfl:     Multiple Vitamins-Minerals (AIRBORNE PO), Take by mouth, Disp: , Rfl:   Allergies   Allergen Reactions    Penicillins Other (See Comments)     Childhood allergy     Vitals:    11/01/23 0826   BP: 108/72   BP Location: Left arm   Patient Position: Sitting   Cuff Size: Standard   Pulse: 93   Resp: 16   SpO2: 97%   Weight: 56.2 kg (124 lb)   Height: 5' 3" (1.6 m)       Labs:  Admission on 09/14/2023, Discharged on 09/14/2023   Component Date Value    Ventricular Rate 09/14/2023 94     Atrial Rate 09/14/2023 94     MD Interval 09/14/2023 112     QRSD Interval 09/14/2023 78     QT Interval 09/14/2023 366     QTC Interval 09/14/2023 457     P Axis 09/14/2023 73     QRS Axis 09/14/2023 82     T Wave [place] 09/14/2023 58     WBC 09/14/2023 9.88     RBC 09/14/2023 4.62     Hemoglobin 09/14/2023 15.3     Hematocrit 09/14/2023 44.7     MCV 09/14/2023 97     MCH 09/14/2023 33.1     MCHC 09/14/2023 34.2     RDW 09/14/2023 13.3     MPV 09/14/2023 9.6     Platelets 97/61/8058 312     nRBC 09/14/2023 0     Neutrophils Relative 09/14/2023 53     Immat GRANS % 09/14/2023 0     Lymphocytes Relative 09/14/2023 37     Monocytes Relative 09/14/2023 7     Eosinophils Relative 09/14/2023 2     Basophils Relative 09/14/2023 1     Neutrophils Absolute 09/14/2023 5.19     Immature Grans Absolute 09/14/2023 0.04     Lymphocytes Absolute 09/14/2023 3.68     Monocytes Absolute 09/14/2023 0.70     Eosinophils Absolute 09/14/2023 0.21     Basophils Absolute 09/14/2023 0.06     Sodium 09/14/2023 137     Potassium 09/14/2023 3.8     Chloride 09/14/2023 101     CO2 09/14/2023 31     ANION GAP 09/14/2023 5     BUN 09/14/2023 14     Creatinine 09/14/2023 0.94     Glucose 09/14/2023 115     Calcium 09/14/2023 10.1     AST 09/14/2023 16     ALT 09/14/2023 12     Alkaline Phosphatase 09/14/2023 72     Total Protein 09/14/2023 7.5     Albumin 09/14/2023 4.7     Total Bilirubin 09/14/2023 0.44     eGFR 09/14/2023 73     SARS-CoV-2 09/14/2023 Negative     INFLUENZA A PCR 09/14/2023 Negative     INFLUENZA B PCR 09/14/2023 Negative     RSV PCR 09/14/2023 Negative     hs TnI 0hr 09/14/2023 <2     TSH 3RD GENERATON 09/14/2023 0.934      Imaging: No results found. Review of Systems:  Review of Systems  REVIEW OF SYSTEMS:  Constitutional:  Denies fever or chills   Eyes:  Denies change in visual acuity   HENT:  Denies nasal congestion or sore throat   Respiratory:  Denies cough or shortness of breath   Cardiovascular: Chest discomfort and palpitations  GI:  Denies abdominal pain, nausea, vomiting, bloody stools or diarrhea   :  Denies dysuria, frequency, difficulty in micturition and nocturia  Musculoskeletal:  Denies back pain or joint pain   Neurologic:  Denies headache, focal weakness or sensory changes   Endocrine:  Denies polyuria or polydipsia   Lymphatic:  Denies swollen glands   Psychiatric:  Denies depression or anxiety    Physical Exam:  Physical Exam  PHYSICAL EXAM:  General:  Patient is not in acute distress   Head: Normocephalic, Atraumatic. HEENT:  Both pupils normal-size atraumatic, normocephalic, nonicteric  Neck:  JVP not raised. Trachea central. No carotid bruit  Respiratory:  normal breath sounds no crackles. no rhonchi  Cardiovascular:  Regular rate and rhythm no S3 no murmurs  GI:  Abdomen soft nontender.  No organomegaly. Lymphatic:  No cervical or inguinal lymphadenopathy  Neurologic:  Patient is awake alert, oriented . Grossly nonfocal   extremities no edema    EKG done in the emergency room showed sinus rhythm with nonspecific T wave abnormality        Discussion/Summary: This patient has symptoms of intermittent palpitations of unclear etiology. Possible etiologies were discussed with the patient. TSH was normal in September 2023. Patient will be scheduled for Holter monitor to assess for any significant arrhythmias. Patient also has symptoms of chest discomfort of unclear etiology. Patient will be scheduled for diagnostic stress test to assess for exercise capacity as well as ischemia. Sensitivity and specificity of stress test discussed with patient. Symptoms watch her from cardiac standpoint which would indicate the need for further cardiac evaluation also discussed. Lipid panel in April 2023 showed cholesterol 174 HDL 51 and . Echocardiogram will be done to evaluate ejection fraction valves and rule out any evidence of structural heart disease. Patient strongly counseled to quit smoking to prevent future cardiovascular events. Follow-up in 2 to 3 months or earlier as needed. Patient is agreeable with the plan of care.

## 2023-11-09 ENCOUNTER — HOSPITAL ENCOUNTER (OUTPATIENT)
Dept: NON INVASIVE DIAGNOSTICS | Facility: CLINIC | Age: 48
Discharge: HOME | End: 2023-11-09
Payer: COMMERCIAL

## 2023-11-09 VITALS
DIASTOLIC BLOOD PRESSURE: 78 MMHG | HEIGHT: 63 IN | HEART RATE: 100 BPM | WEIGHT: 124 LBS | BODY MASS INDEX: 21.97 KG/M2 | OXYGEN SATURATION: 99 % | SYSTOLIC BLOOD PRESSURE: 118 MMHG

## 2023-11-09 VITALS
HEIGHT: 63 IN | BODY MASS INDEX: 21.97 KG/M2 | DIASTOLIC BLOOD PRESSURE: 72 MMHG | HEART RATE: 84 BPM | WEIGHT: 124 LBS | SYSTOLIC BLOOD PRESSURE: 108 MMHG

## 2023-11-09 DIAGNOSIS — R07.89 CHEST DISCOMFORT: ICD-10-CM

## 2023-11-09 DIAGNOSIS — R00.2 PALPITATIONS: ICD-10-CM

## 2023-11-09 LAB
AORTIC ROOT: 3.1 CM
APICAL FOUR CHAMBER EJECTION FRACTION: 59 %
CHEST PAIN STATEMENT: NORMAL
E WAVE DECELERATION TIME: 215 MS
E/A RATIO: 0.83
FRACTIONAL SHORTENING: 34 (ref 28–44)
INTERVENTRICULAR SEPTUM IN DIASTOLE (PARASTERNAL SHORT AXIS VIEW): 0.7 CM
INTERVENTRICULAR SEPTUM: 0.7 CM (ref 0.6–1.1)
LAAS-AP2: 10.6 CM2
LAAS-AP4: 11.1 CM2
LEFT ATRIUM AREA SYSTOLE SINGLE PLANE A4C: 10.2 CM2
LEFT ATRIUM VOLUME (MOD BIPLANE): 22 ML
LEFT ATRIUM VOLUME INDEX (MOD BIPLANE): 13.9 ML/M2
LEFT INTERNAL DIMENSION IN SYSTOLE: 2.9 CM (ref 2.1–4)
LEFT VENTRICULAR INTERNAL DIMENSION IN DIASTOLE: 4.4 CM (ref 3.5–6)
LEFT VENTRICULAR POSTERIOR WALL IN END DIASTOLE: 0.6 CM
LEFT VENTRICULAR STROKE VOLUME: 56 ML
LVSV (TEICH): 56 ML
MAX DIASTOLIC BP: 80 MMHG
MAX HR PERCENT: 85 %
MAX HR: 150 BPM
MAX PREDICTED HEART RATE: 175 BPM
MV E'TISSUE VEL-SEP: 11 CM/S
MV PEAK A VEL: 1 M/S
MV PEAK E VEL: 83 CM/S
MV STENOSIS PRESSURE HALF TIME: 62 MS
MV VALVE AREA P 1/2 METHOD: 3.55
PROTOCOL NAME: NORMAL
RATE PRESSURE PRODUCT: NORMAL
REASON FOR TERMINATION: NORMAL
RIGHT ATRIUM AREA SYSTOLE A4C: 6.8 CM2
RIGHT VENTRICLE ID DIMENSION: 2.4 CM
SL CV LEFT ATRIUM LENGTH A2C: 4.6 CM
SL CV LV EF: 60
SL CV PED ECHO LEFT VENTRICLE DIASTOLIC VOLUME (MOD BIPLANE) 2D: 88 ML
SL CV PED ECHO LEFT VENTRICLE SYSTOLIC VOLUME (MOD BIPLANE) 2D: 31 ML
SL CV STRESS RECOVERY BP: NORMAL MMHG
SL CV STRESS RECOVERY HR: 102 BPM
SL CV STRESS RECOVERY O2 SAT: 98 %
SL CV STRESS STAGE REACHED: 3
STRESS ANGINA INDEX: 0
STRESS BASELINE BP: NORMAL MMHG
STRESS BASELINE HR: 100 BPM
STRESS O2 SAT REST: 99 %
STRESS PEAK HR: 150 BPM
STRESS POST ESTIMATED WORKLOAD: 10.1 METS
STRESS POST EXERCISE DUR MIN: 9 MIN
STRESS POST EXERCISE DUR MIN: 9 MIN
STRESS POST EXERCISE DUR SEC: 1 SEC
STRESS POST EXERCISE DUR SEC: 1 SEC
STRESS POST O2 SAT PEAK: 99 %
STRESS POST PEAK BP: 150 MMHG
STRESS POST PEAK HR: 169 BPM
STRESS POST PEAK SYSTOLIC BP: 150 MMHG
TARGET HR FORMULA: NORMAL
TEST INDICATION: NORMAL
TR MAX PG: 26 MMHG
TR PEAK VELOCITY: 2.5 M/S
TRICUSPID ANNULAR PLANE SYSTOLIC EXCURSION: 2.3 CM
TRICUSPID VALVE PEAK REGURGITATION VELOCITY: 2.53 M/S

## 2023-11-09 PROCEDURE — 93225 XTRNL ECG REC<48 HRS REC: CPT

## 2023-11-09 PROCEDURE — 93306 TTE W/DOPPLER COMPLETE: CPT

## 2023-11-09 PROCEDURE — 93016 CV STRESS TEST SUPVJ ONLY: CPT | Performed by: INTERNAL MEDICINE

## 2023-11-09 PROCEDURE — 93017 CV STRESS TEST TRACING ONLY: CPT

## 2023-11-09 PROCEDURE — 93306 TTE W/DOPPLER COMPLETE: CPT | Performed by: INTERNAL MEDICINE

## 2023-11-09 PROCEDURE — 93018 CV STRESS TEST I&R ONLY: CPT | Performed by: INTERNAL MEDICINE

## 2023-11-09 PROCEDURE — 93226 XTRNL ECG REC<48 HR SCAN A/R: CPT

## 2023-11-10 ENCOUNTER — TELEPHONE (OUTPATIENT)
Dept: CARDIOLOGY CLINIC | Facility: CLINIC | Age: 48
End: 2023-11-10

## 2023-11-10 NOTE — TELEPHONE ENCOUNTER
----- Message from Kelli Olsen MD sent at 11/9/2023  3:54 PM EST -----  Please call the patient. Echocardiogram shows normal ejection fraction with no significant valvular abnormalities. Stress test shows good functional aerobic capacity with no evidence of ischemia. Thank you.

## 2023-11-14 PROCEDURE — 93227 XTRNL ECG REC<48 HR R&I: CPT | Performed by: INTERNAL MEDICINE

## 2023-11-16 LAB
MAX HR PERCENT: 85 %
MAX HR: 150 BPM
STRESS BASELINE BP: NORMAL MMHG
STRESS BASELINE HR: 85 BPM
STRESS POST ESTIMATED WORKLOAD: 10.1 METS
STRESS POST PEAK HR: 169 BPM
STRESS POST PEAK SYSTOLIC BP: 150 MMHG

## 2023-12-04 ENCOUNTER — TELEPHONE (OUTPATIENT)
Dept: CARDIOLOGY CLINIC | Facility: CLINIC | Age: 48
End: 2023-12-04

## 2023-12-04 NOTE — TELEPHONE ENCOUNTER
----- Message from Tracy Banuelos MD sent at 12/2/2023  4:54 PM EST -----  Please call the patient. Holter monitor overall unremarkable. Some palpitations correlated with PVCs. Would not recommend any treatment at this time unless she is symptomatic.

## 2023-12-04 NOTE — TELEPHONE ENCOUNTER
Spoke to pt and relayed Dr. Nikolay Felton response, pt verbalized understanding. Pt wanted to know if she should still keep her appointment  for January.

## 2023-12-11 DIAGNOSIS — N95.1 PERIMENOPAUSAL SYMPTOMS: Primary | ICD-10-CM

## 2023-12-11 RX ORDER — PROGESTERONE 100 MG/1
100 CAPSULE ORAL
Qty: 30 CAPSULE | Refills: 11 | Status: SHIPPED | OUTPATIENT
Start: 2023-12-11

## 2024-01-02 DIAGNOSIS — N95.1 PERIMENOPAUSAL SYMPTOMS: ICD-10-CM

## 2024-01-02 RX ORDER — PROGESTERONE 100 MG/1
100 CAPSULE ORAL
Qty: 90 CAPSULE | Refills: 4 | Status: SHIPPED | OUTPATIENT
Start: 2024-01-02

## 2024-02-26 ENCOUNTER — OFFICE VISIT (OUTPATIENT)
Dept: OBGYN CLINIC | Facility: HOSPITAL | Age: 49
End: 2024-02-26
Payer: COMMERCIAL

## 2024-02-26 VITALS — WEIGHT: 125 LBS | BODY MASS INDEX: 21.34 KG/M2 | HEIGHT: 64 IN

## 2024-02-26 DIAGNOSIS — G56.02 CARPAL TUNNEL SYNDROME OF LEFT WRIST: Primary | ICD-10-CM

## 2024-02-26 DIAGNOSIS — G56.23 CUBITAL TUNNEL SYNDROME OF BOTH UPPER EXTREMITIES: ICD-10-CM

## 2024-02-26 DIAGNOSIS — G56.01 CARPAL TUNNEL SYNDROME ON RIGHT: ICD-10-CM

## 2024-02-26 PROCEDURE — 99215 OFFICE O/P EST HI 40 MIN: CPT | Performed by: SURGERY

## 2024-02-26 RX ORDER — CHLORHEXIDINE GLUCONATE 4 G/100ML
SOLUTION TOPICAL DAILY PRN
OUTPATIENT
Start: 2024-02-26

## 2024-02-26 RX ORDER — SODIUM CHLORIDE 9 MG/ML
125 INJECTION, SOLUTION INTRAVENOUS CONTINUOUS
OUTPATIENT
Start: 2024-02-26

## 2024-02-26 RX ORDER — CHLORHEXIDINE GLUCONATE ORAL RINSE 1.2 MG/ML
15 SOLUTION DENTAL ONCE
OUTPATIENT
Start: 2024-02-26 | End: 2024-02-26

## 2024-02-26 NOTE — H&P
Annemarie العراقي M.D.  Attending, Orthopaedic Surgery  Hand, Wrist, and Elbow Surgery  [facility]      ORTHOPAEDIC HAND, WRIST, AND ELBOW OFFICE  VISIT       ASSESSMENT/PLAN:      48 y.o. female with bilateral carpal tunnel left worse then right, and suspicion for bilateral cubital tunnel. In regards to cubital tunnel bilateral US of elbow ordered for further evaluation.     In regards to her left carpal tunnel, EMG reviewed, clinical exam performed. Based on patients symptoms and previous treatments a left carpal tunnel release is warranted. If EMG of left elbow confirms cubital tunnel a cubital tunnel release will be performed at the same time. Pre and post operative expectations were reviewed, risks and benefits discussed. Consent was signed in clinic today, she will meet with my surgery scheduler today. We will see her back in clinic post operatively.     The patient verbalized understanding of exam findings and treatment plan. We engaged in the shared decision-making process and treatment options were discussed at length with the patient. Surgical and conservative management discussed today along with risks and benefits.    Diagnoses and all orders for this visit:    Carpal tunnel syndrome of left wrist  -     Cancel: US MSK limited; Future    Cubital tunnel syndrome of both upper extremities  -     Cancel: US MSK limited; Future  -     US MSK limited; Future    Carpal tunnel syndrome on right  -     US MSK limited; Future        Follow Up:  Return for Post op .    To Do Next Visit:    and Re-evaluation of current issue      General Discussions:  Carpal Tunnel Syndrome: The anatomy and physiology of carpal tunnel syndrome was discussed with the patient today.  Increase pressure localized under the transverse carpal ligament can cause pain, numbness, tingling, or dysesthesias within the median nerve distribution as well as feelings of fatigue, clumsiness, or awkwardness.  These symptoms  typically occur at night and worse in the morning upon waking.  Eventually, untreated carpal tunnel syndrome can result in weakness and permanent loss of muscle within the thenar compartment of the hand.  Treatment options were discussed with the patient.  Conservative treatment includes nocturnal resting splints to keep the nerve in a neutral position, ergonomic changes within the work or home environment, activity modification, and tendon gliding exercises. Vitamin B6 one tablet daily over the counter may helpful to reduce symptoms.   Steroid injections within the carpal canal can help a majority of patients, however this is often self-limited in a majority of patients.  Surgical intervention to divide the transverse carpal ligament typically results in a long-lasting relief of the patient's complaints, with the recurrence rate of less than 1%.                                                                                                                                                                                   Operative Discussions:  Open Carpal Tunnel Release:   The anatomy and physiology of carpal tunnel syndrome was discussed with the patient today.  Increase pressure localized under the transverse carpal ligament can cause pain, numbness, tingling, or dysesthesias within the median nerve distribution as well as feelings of fatigue, clumsiness, or awkwardness.  These symptoms typically occur at night and worse in the morning upon waking.  Eventually, untreated carpal tunnel syndrome can result in weakness and permanent loss of muscle within the thenar compartment of the hand.  Treatment options were discussed with the patient.  Conservative treatment includes nocturnal resting splints to keep the nerve in a neutral position, ergonomic changes within the work or home environment, activity modification, and tendon gliding exercises.  Vitamin B6 one tablet daily over the counter may helpful to reduce  symptoms.  Steroid injections within the carpal canal can help a majority of patients, however this is often self-limited in a majority of patients.  Surgical intervention to divide the transverse carpal ligament typically results in a long-lasting relief of the patient's complaints, with the recurrence rate of less than 1%. The patient has elected to undergo an open carpal tunnel release.  The palmar incision technique was discussed in the office with the patient today.   In the postoperative period, light activities are allowed immediately, driving is allowed when narcotic medication has stopped, and the bandages may be removed and incision may get wet after 2 days.  Heavy activities (lifting more than approximately 10 pounds) will be allowed after follow up appointment in 1-2 weeks.  While night symptoms (waking from sleep, pain, and discomfort in the hands) generally improves rapidly, the numbness and tingling as well as the strength will slowly improve over weeks to months depending on the chronicity and severity of the carpal tunnel syndrome.  Pillar pain and scar discomfort were discussed with the patient which are self-limiting conditions.  The risks of bleeding and infection from the surgery are less than 1%.  Risk of recurrence is approximately 0.5%.  The risks of nerve injury or nerve damage or damage to the blood vessels is approximately 1 in 1200. The patient has an understanding of the above mentioned discussion.The risks and benefits of the procedure were explained to the patient, which include, but are not limited to: Bleeding, infection, recurrence, pain, scar, damage to tendons, damage to nerves, and damage to blood vessels, failure to give desired results and complications related to anesthesia.  These risks, along with alternative conservative treatment options, and postoperative protocols were voiced back and understood by the patient.  All questions were answered to the patient's satisfaction.   The patient agrees to comply with a standard postoperative protocol, and is willing to proceed.  Education was provided via written and auditory forms.  There were no barriers to learning. Written handouts regarding wound care, incision and scar care, and general preoperative information was provided to the patient.  Prior to surgery, the patient may be requested to stop all anti-inflammatory medications.  Prophylactic aspirin, Plavix, and Coumadin may be allowed to be continued.  Medications including vitamin E., ginkgo, and fish oil are requested to be stopped approximately one week prior to surgery.  Hypertensive medications and beta blockers, if taken, s      ____________________________________________________________________________________________________________________________________________      CHIEF COMPLAINT:  Chief Complaint   Patient presents with    Right Hand - Carpal Tunnel    Left Hand - Carpal Tunnel       SUBJECTIVE:  Karine Downs is a 48 y.o. year old LHD female who presents for initial evaluation of bilateral hand pain, numbness, and tingling. Patient states she first noticed symptoms in the left hand in 2019. She underwent an EMG LUE confirming carpal tunnel. Patient wore night time splints and states her symptoms resolved until recently. Patient notes numbness, tingling, and pain to bilateral hands most severe at night and while driving. Symptoms are in index, long, and ring fingers. She describes nocturnal symptoms despite splint use. Weakness and difficulty gripping with left hand.        Pain/symptom timing:  Worse during the day when active  Pain/symptom context:  Worse with activites and work  Pain/symptom modifying factors:  Rest makes better, activities make worse  Pain/symptom associated signs/symptoms: none    Prior treatment   NSAIDsYes   Injections No   Bracing/Orthotics Yes    Physical Therapy No     I have personally reviewed all the relevant PMH, PSH, SH, FH, Medications and  allergies      PAST MEDICAL HISTORY:  Past Medical History:   Diagnosis Date    Internal derangement of knee joint, left     resolved 10/04/2017    Lyme disease     Varicella        PAST SURGICAL HISTORY:  Past Surgical History:   Procedure Laterality Date    KS HYSTEROSCOPY BX ENDOMETRIUM&/POLYPC W/WO D&C N/A 01/31/2020    Procedure: DILATATION AND CURETTAGE (D&C) WITH HYSTEROSCOPY;  Surgeon: Mirlande Fisher MD;  [facility];  Service: Gynecology    KS HYSTEROSCOPY ENDOMETRIAL ABLATION N/A 01/31/2020    Procedure: ABLATION ENDOMETRIAL NOVASURE;  Surgeon: Mirlande Fisher MD;  [facility];  Service: Gynecology    KS LAPAROSCOPY W/RMVL ADNEXAL STRUCTURES Bilateral 01/31/2020    Procedure: SALPINGECTOMY, LAPAROSCOPIC BILATERAL;  Surgeon: Mirlande Fisher MD;  [facility];  Service: Gynecology    KS REMOVAL INTRAUTERINE DEVICE IUD N/A 01/31/2020    Procedure: REMOVAL OF INTRAUTERINE DEVICE (IUD);  Surgeon: Mirlande Fisher MD;  [facility];  Service: Gynecology    WISDOM TOOTH EXTRACTION         FAMILY HISTORY:  Family History   Problem Relation Age of Onset    Thyroid disease Mother     Thyroid disease Sister     Heart disease Maternal Grandmother         cardiac disorder    Thyroid disease Maternal Grandmother     No Known Problems Paternal Grandmother     No Known Problems Maternal Aunt     Thyroid disease Sister        SOCIAL HISTORY:  Social History     Tobacco Use    Smoking status: Every Day     Current packs/day: 0.50     Average packs/day: 0.5 packs/day for 20.0 years (10.0 ttl pk-yrs)     Types: Cigarettes    Smokeless tobacco: Never   Vaping Use    Vaping status: Some Days    Substances: Nicotine, Flavoring   Substance Use Topics    Alcohol use: Yes     Comment: socially    Drug use: No       MEDICATIONS:    Current Outpatient Medications:     Acetaminophen (TYLENOL PO), Take by mouth, Disp: , Rfl:     Ascorbic Acid (VITAMIN C PO), Take by mouth, Disp:  , Rfl:     Cholecalciferol (VITAMIN D3) 1000 units CAPS, Daily, Disp: , Rfl:     Cyanocobalamin (VITAMIN B-12 PO), Take by mouth, Disp: , Rfl:     cyclobenzaprine (FLEXERIL) 5 mg tablet, Take 0.5 tablets (2.5 mg total) by mouth daily at bedtime, Disp: 20 tablet, Rfl: 0    ibuprofen (MOTRIN) 600 mg tablet, Take 1 tablet (600 mg total) by mouth every 6 (six) hours as needed for moderate pain, Disp: 30 tablet, Rfl: 0    MAGNESIUM PO, Take by mouth, Disp: , Rfl:     Misc Natural Products (Cortisol Manager) TABS, Take 1 tablet by mouth every other day, Disp: , Rfl:     Multiple Vitamin (MULTIVITAMIN) tablet, Take 1 tablet by mouth daily, Disp: , Rfl:     Multiple Vitamins-Minerals (AIRBORNE PO), Take by mouth, Disp: , Rfl:     Progesterone 100 MG CAPS, TAKE 100 MG BY MOUTH AT BEDTIME, Disp: 90 capsule, Rfl: 4    ALLERGIES:  Allergies   Allergen Reactions    Penicillins Other (See Comments)     Childhood allergy           REVIEW OF SYSTEMS:  Review of Systems   Constitutional:  Negative for chills and fever.   HENT:  Negative for ear pain and sore throat.    Eyes:  Negative for pain and visual disturbance.   Respiratory:  Negative for cough and shortness of breath.    Cardiovascular:  Negative for chest pain and palpitations.   Gastrointestinal:  Negative for abdominal pain and vomiting.   Genitourinary:  Negative for dysuria and hematuria.   Musculoskeletal:  Negative for arthralgias and back pain.   Skin:  Negative for color change and rash.   Neurological:  Negative for seizures and syncope.   All other systems reviewed and are negative.      VITALS:  There were no vitals filed for this visit.    LABS:  HgA1c:   Lab Results   Component Value Date    HGBA1C 5.1 04/19/2018     BMP:   Lab Results   Component Value Date    CALCIUM 10.1 09/14/2023    K 3.8 09/14/2023    CO2 31 09/14/2023     09/14/2023    BUN 14 09/14/2023    CREATININE 0.94 09/14/2023        _____________________________________________________  PHYSICAL EXAMINATION:  General: well developed and well nourished, alert, oriented times 3, and appears comfortable  Psychiatric: Normal  HEENT: Normocephalic, Atraumatic Trachea Midline, No torticollis  Pulmonary: No audible wheezing or respiratory distress   Abdomen/GI: Non tender, non distended   Cardiovascular: No pitting edema, 2+ radial pulse   Skin: No masses, erythema, lacerations, fluctation, ulcerations  Neurovascular: Sensation Intact to the Median, Ulnar, Radial Nerve, Motor Intact to the Median, Ulnar, Radial Nerve, and Pulses Intact  Musculoskeletal: Normal, except as noted in detailed exam and in HPI.      MUSCULOSKELETAL EXAMINATION:    right Hand -    Patient presents with (no) obvious anatomical deformity  Skin is warm and dry to touch with no signs of erythema, ecchymosis, or infection  No soft tissue swelling or effusion noted  Full FDS, FDP, extensor mechanisms are intact  No rotational deformity with composite finger flexion  (+) phalen's  (-) tinels at the wrist   (+) tinels at the elbow  Demonstrates normal wrist, elbow, and shoulder motion  Forearm compartments are soft and supple  2+ distal radial pulse with brisk capillary refill to the fingers  Radial, median, and ulnar motor and sensory distribution intact  Sensations light to touch intact distally    left Hand -    Patient presents with (no) obvious anatomical deformity  Skin is warm and dry to touch with no signs of erythema, ecchymosis, or infection  No soft tissue swelling or effusion noted  Full FDS, FDP, extensor mechanisms are intact  No rotational deformity with composite finger flexion  Thenar atrophy noted   (+) phalen's  (-) tinels at the wrist  Demonstrates normal wrist, elbow, and shoulder motion  Forearm compartments are soft and supple  2+ distal radial pulse with brisk capillary refill to the fingers  Radial, median, and ulnar motor and sensory distribution  intact  Sensations light to touch intact distally    ___________________________________________________  STUDIES REVIEWED:  No new studies obtained today         PROCEDURES PERFORMED:  Procedures  No Procedures performed today    _____________________________________________________      Scribe Attestation      I,:  Lydia Tate am acting as a scribe while in the presence of the attending physician.:       I,:  Annemarie العراقي MD personally performed the services described in this documentation    as scribed in my presence.:

## 2024-02-26 NOTE — H&P
Annemarie العراقي M.D.  Attending, Orthopaedic Surgery  Hand, Wrist, and Elbow Surgery  [facility]      ORTHOPAEDIC HAND, WRIST, AND ELBOW OFFICE  VISIT       ASSESSMENT/PLAN:      48 y.o. female with bilateral carpal tunnel left worse then right, and suspicion for bilateral cubital tunnel. In regards to cubital tunnel bilateral US of elbow ordered for further evaluation.     In regards to her left carpal tunnel, EMG reviewed, clinical exam performed. Based on patients symptoms and previous treatments a left carpal tunnel release is warranted. If EMG of left elbow confirms cubital tunnel a cubital tunnel release will be performed at the same time. Pre and post operative expectations were reviewed, risks and benefits discussed. Consent was signed in clinic today, she will meet with my surgery scheduler today. We will see her back in clinic post operatively.     The patient verbalized understanding of exam findings and treatment plan. We engaged in the shared decision-making process and treatment options were discussed at length with the patient. Surgical and conservative management discussed today along with risks and benefits.    Diagnoses and all orders for this visit:    Carpal tunnel syndrome of left wrist  -     Cancel: US MSK limited; Future    Cubital tunnel syndrome of both upper extremities  -     Cancel: US MSK limited; Future  -     US MSK limited; Future    Carpal tunnel syndrome on right  -     US MSK limited; Future        Follow Up:  Return for Post op .    To Do Next Visit:    and Re-evaluation of current issue      General Discussions:  Carpal Tunnel Syndrome: The anatomy and physiology of carpal tunnel syndrome was discussed with the patient today.  Increase pressure localized under the transverse carpal ligament can cause pain, numbness, tingling, or dysesthesias within the median nerve distribution as well as feelings of fatigue, clumsiness, or awkwardness.  These symptoms  typically occur at night and worse in the morning upon waking.  Eventually, untreated carpal tunnel syndrome can result in weakness and permanent loss of muscle within the thenar compartment of the hand.  Treatment options were discussed with the patient.  Conservative treatment includes nocturnal resting splints to keep the nerve in a neutral position, ergonomic changes within the work or home environment, activity modification, and tendon gliding exercises. Vitamin B6 one tablet daily over the counter may helpful to reduce symptoms.   Steroid injections within the carpal canal can help a majority of patients, however this is often self-limited in a majority of patients.  Surgical intervention to divide the transverse carpal ligament typically results in a long-lasting relief of the patient's complaints, with the recurrence rate of less than 1%.                                                                                                                                                                                   Operative Discussions:  Open Carpal Tunnel Release:   The anatomy and physiology of carpal tunnel syndrome was discussed with the patient today.  Increase pressure localized under the transverse carpal ligament can cause pain, numbness, tingling, or dysesthesias within the median nerve distribution as well as feelings of fatigue, clumsiness, or awkwardness.  These symptoms typically occur at night and worse in the morning upon waking.  Eventually, untreated carpal tunnel syndrome can result in weakness and permanent loss of muscle within the thenar compartment of the hand.  Treatment options were discussed with the patient.  Conservative treatment includes nocturnal resting splints to keep the nerve in a neutral position, ergonomic changes within the work or home environment, activity modification, and tendon gliding exercises.  Vitamin B6 one tablet daily over the counter may helpful to reduce  symptoms.  Steroid injections within the carpal canal can help a majority of patients, however this is often self-limited in a majority of patients.  Surgical intervention to divide the transverse carpal ligament typically results in a long-lasting relief of the patient's complaints, with the recurrence rate of less than 1%. The patient has elected to undergo an open carpal tunnel release.  The palmar incision technique was discussed in the office with the patient today.   In the postoperative period, light activities are allowed immediately, driving is allowed when narcotic medication has stopped, and the bandages may be removed and incision may get wet after 2 days.  Heavy activities (lifting more than approximately 10 pounds) will be allowed after follow up appointment in 1-2 weeks.  While night symptoms (waking from sleep, pain, and discomfort in the hands) generally improves rapidly, the numbness and tingling as well as the strength will slowly improve over weeks to months depending on the chronicity and severity of the carpal tunnel syndrome.  Pillar pain and scar discomfort were discussed with the patient which are self-limiting conditions.  The risks of bleeding and infection from the surgery are less than 1%.  Risk of recurrence is approximately 0.5%.  The risks of nerve injury or nerve damage or damage to the blood vessels is approximately 1 in 1200. The patient has an understanding of the above mentioned discussion.The risks and benefits of the procedure were explained to the patient, which include, but are not limited to: Bleeding, infection, recurrence, pain, scar, damage to tendons, damage to nerves, and damage to blood vessels, failure to give desired results and complications related to anesthesia.  These risks, along with alternative conservative treatment options, and postoperative protocols were voiced back and understood by the patient.  All questions were answered to the patient's satisfaction.   The patient agrees to comply with a standard postoperative protocol, and is willing to proceed.  Education was provided via written and auditory forms.  There were no barriers to learning. Written handouts regarding wound care, incision and scar care, and general preoperative information was provided to the patient.  Prior to surgery, the patient may be requested to stop all anti-inflammatory medications.  Prophylactic aspirin, Plavix, and Coumadin may be allowed to be continued.  Medications including vitamin E., ginkgo, and fish oil are requested to be stopped approximately one week prior to surgery.  Hypertensive medications and beta blockers, if taken, s      ____________________________________________________________________________________________________________________________________________      CHIEF COMPLAINT:  Chief Complaint   Patient presents with    Right Hand - Carpal Tunnel    Left Hand - Carpal Tunnel       SUBJECTIVE:  Karine Downs is a 48 y.o. year old LHD female who presents for initial evaluation of bilateral hand pain, numbness, and tingling. Patient states she first noticed symptoms in the left hand in 2019. She underwent an EMG LUE confirming carpal tunnel. Patient wore night time splints and states her symptoms resolved until recently. Patient notes numbness, tingling, and pain to bilateral hands most severe at night and while driving. Symptoms are in index, long, and ring fingers. She describes nocturnal symptoms despite splint use. Weakness and difficulty gripping with left hand.        Pain/symptom timing:  Worse during the day when active  Pain/symptom context:  Worse with activites and work  Pain/symptom modifying factors:  Rest makes better, activities make worse  Pain/symptom associated signs/symptoms: none    Prior treatment   NSAIDsYes   Injections No   Bracing/Orthotics Yes    Physical Therapy No     I have personally reviewed all the relevant PMH, PSH, SH, FH, Medications and  allergies      PAST MEDICAL HISTORY:  Past Medical History:   Diagnosis Date    Internal derangement of knee joint, left     resolved 10/04/2017    Lyme disease     Varicella        PAST SURGICAL HISTORY:  Past Surgical History:   Procedure Laterality Date    MS HYSTEROSCOPY BX ENDOMETRIUM&/POLYPC W/WO D&C N/A 01/31/2020    Procedure: DILATATION AND CURETTAGE (D&C) WITH HYSTEROSCOPY;  Surgeon: Mirlande Fisher MD;  Location: [facility];  Service: Gynecology    MS HYSTEROSCOPY ENDOMETRIAL ABLATION N/A 01/31/2020    Procedure: ABLATION ENDOMETRIAL NOVASURE;  Surgeon: Mirlande Fisher MD;  Location: [facility];  Service: Gynecology    MS LAPAROSCOPY W/RMVL ADNEXAL STRUCTURES Bilateral 01/31/2020    Procedure: SALPINGECTOMY, LAPAROSCOPIC BILATERAL;  Surgeon: Mirlande Fisher MD;  Location: [facility];  Service: Gynecology    MS REMOVAL INTRAUTERINE DEVICE IUD N/A 01/31/2020    Procedure: REMOVAL OF INTRAUTERINE DEVICE (IUD);  Surgeon: Mirlande Fisher MD;  Location: [facility];  Service: Gynecology    WISDOM TOOTH EXTRACTION         FAMILY HISTORY:  Family History   Problem Relation Age of Onset    Thyroid disease Mother     Thyroid disease Sister     Heart disease Maternal Grandmother         cardiac disorder    Thyroid disease Maternal Grandmother     No Known Problems Paternal Grandmother     No Known Problems Maternal Aunt     Thyroid disease Sister        SOCIAL HISTORY:  Social History     Tobacco Use    Smoking status: Every Day     Current packs/day: 0.50     Average packs/day: 0.5 packs/day for 20.0 years (10.0 ttl pk-yrs)     Types: Cigarettes    Smokeless tobacco: Never   Vaping Use    Vaping status: Some Days    Substances: Nicotine, Flavoring   Substance Use Topics    Alcohol use: Yes     Comment: socially    Drug use: No       MEDICATIONS:    Current Outpatient Medications:     Acetaminophen (TYLENOL PO), Take by mouth, Disp: , Rfl:     Ascorbic Acid (VITAMIN C PO), Take by mouth, Disp:  , Rfl:     Cholecalciferol (VITAMIN D3) 1000 units CAPS, Daily, Disp: , Rfl:     Cyanocobalamin (VITAMIN B-12 PO), Take by mouth, Disp: , Rfl:     cyclobenzaprine (FLEXERIL) 5 mg tablet, Take 0.5 tablets (2.5 mg total) by mouth daily at bedtime, Disp: 20 tablet, Rfl: 0    ibuprofen (MOTRIN) 600 mg tablet, Take 1 tablet (600 mg total) by mouth every 6 (six) hours as needed for moderate pain, Disp: 30 tablet, Rfl: 0    MAGNESIUM PO, Take by mouth, Disp: , Rfl:     Misc Natural Products (Cortisol Manager) TABS, Take 1 tablet by mouth every other day, Disp: , Rfl:     Multiple Vitamin (MULTIVITAMIN) tablet, Take 1 tablet by mouth daily, Disp: , Rfl:     Multiple Vitamins-Minerals (AIRBORNE PO), Take by mouth, Disp: , Rfl:     Progesterone 100 MG CAPS, TAKE 100 MG BY MOUTH AT BEDTIME, Disp: 90 capsule, Rfl: 4    ALLERGIES:  Allergies   Allergen Reactions    Penicillins Other (See Comments)     Childhood allergy           REVIEW OF SYSTEMS:  Review of Systems   Constitutional:  Negative for chills and fever.   HENT:  Negative for ear pain and sore throat.    Eyes:  Negative for pain and visual disturbance.   Respiratory:  Negative for cough and shortness of breath.    Cardiovascular:  Negative for chest pain and palpitations.   Gastrointestinal:  Negative for abdominal pain and vomiting.   Genitourinary:  Negative for dysuria and hematuria.   Musculoskeletal:  Negative for arthralgias and back pain.   Skin:  Negative for color change and rash.   Neurological:  Negative for seizures and syncope.   All other systems reviewed and are negative.      VITALS:  There were no vitals filed for this visit.    LABS:  HgA1c:   Lab Results   Component Value Date    HGBA1C 5.1 04/19/2018     BMP:   Lab Results   Component Value Date    CALCIUM 10.1 09/14/2023    K 3.8 09/14/2023    CO2 31 09/14/2023     09/14/2023    BUN 14 09/14/2023    CREATININE 0.94 09/14/2023        _____________________________________________________  PHYSICAL EXAMINATION:  General: well developed and well nourished, alert, oriented times 3, and appears comfortable  Psychiatric: Normal  HEENT: Normocephalic, Atraumatic Trachea Midline, No torticollis  Pulmonary: No audible wheezing or respiratory distress   Abdomen/GI: Non tender, non distended   Cardiovascular: No pitting edema, 2+ radial pulse   Skin: No masses, erythema, lacerations, fluctation, ulcerations  Neurovascular: Sensation Intact to the Median, Ulnar, Radial Nerve, Motor Intact to the Median, Ulnar, Radial Nerve, and Pulses Intact  Musculoskeletal: Normal, except as noted in detailed exam and in HPI.      MUSCULOSKELETAL EXAMINATION:    right Hand -    Patient presents with (no) obvious anatomical deformity  Skin is warm and dry to touch with no signs of erythema, ecchymosis, or infection  No soft tissue swelling or effusion noted  Full FDS, FDP, extensor mechanisms are intact  No rotational deformity with composite finger flexion  (+) phalen's  (-) tinels at the wrist   (+) tinels at the elbow  Demonstrates normal wrist, elbow, and shoulder motion  Forearm compartments are soft and supple  2+ distal radial pulse with brisk capillary refill to the fingers  Radial, median, and ulnar motor and sensory distribution intact  Sensations light to touch intact distally    left Hand -    Patient presents with (no) obvious anatomical deformity  Skin is warm and dry to touch with no signs of erythema, ecchymosis, or infection  No soft tissue swelling or effusion noted  Full FDS, FDP, extensor mechanisms are intact  No rotational deformity with composite finger flexion  Thenar atrophy noted   (+) phalen's  (-) tinels at the wrist  Demonstrates normal wrist, elbow, and shoulder motion  Forearm compartments are soft and supple  2+ distal radial pulse with brisk capillary refill to the fingers  Radial, median, and ulnar motor and sensory distribution  intact  Sensations light to touch intact distally    ___________________________________________________  STUDIES REVIEWED:  2019 L NCS/EMG reviewed c/w left carpal tunnel syndrome, no evidence of cervical radiculopathy or ulnar neuropathy         PROCEDURES PERFORMED:  Procedures  No Procedures performed today    _____________________________________________________      Scribe Attestation      I,:  Lydia Tate am acting as a scribe while in the presence of the attending physician.:       I,:  Annemarie العراقي MD personally performed the services described in this documentation    as scribed in my presence.:

## 2024-02-26 NOTE — H&P (VIEW-ONLY)
Annemarie العراقي M.D.  Attending, Orthopaedic Surgery  Hand, Wrist, and Elbow Surgery  [facility]      ORTHOPAEDIC HAND, WRIST, AND ELBOW OFFICE  VISIT       ASSESSMENT/PLAN:      48 y.o. female with bilateral carpal tunnel left worse then right, and suspicion for bilateral cubital tunnel. In regards to cubital tunnel bilateral US of elbow ordered for further evaluation.     In regards to her left carpal tunnel, EMG reviewed, clinical exam performed. Based on patients symptoms and previous treatments a left carpal tunnel release is warranted. If EMG of left elbow confirms cubital tunnel a cubital tunnel release will be performed at the same time. Pre and post operative expectations were reviewed, risks and benefits discussed. Consent was signed in clinic today, she will meet with my surgery scheduler today. We will see her back in clinic post operatively.     The patient verbalized understanding of exam findings and treatment plan. We engaged in the shared decision-making process and treatment options were discussed at length with the patient. Surgical and conservative management discussed today along with risks and benefits.    Diagnoses and all orders for this visit:    Carpal tunnel syndrome of left wrist  -     Cancel: US MSK limited; Future    Cubital tunnel syndrome of both upper extremities  -     Cancel: US MSK limited; Future  -     US MSK limited; Future    Carpal tunnel syndrome on right  -     US MSK limited; Future        Follow Up:  Return for Post op .    To Do Next Visit:    and Re-evaluation of current issue      General Discussions:  Carpal Tunnel Syndrome: The anatomy and physiology of carpal tunnel syndrome was discussed with the patient today.  Increase pressure localized under the transverse carpal ligament can cause pain, numbness, tingling, or dysesthesias within the median nerve distribution as well as feelings of fatigue, clumsiness, or awkwardness.  These symptoms  typically occur at night and worse in the morning upon waking.  Eventually, untreated carpal tunnel syndrome can result in weakness and permanent loss of muscle within the thenar compartment of the hand.  Treatment options were discussed with the patient.  Conservative treatment includes nocturnal resting splints to keep the nerve in a neutral position, ergonomic changes within the work or home environment, activity modification, and tendon gliding exercises. Vitamin B6 one tablet daily over the counter may helpful to reduce symptoms.   Steroid injections within the carpal canal can help a majority of patients, however this is often self-limited in a majority of patients.  Surgical intervention to divide the transverse carpal ligament typically results in a long-lasting relief of the patient's complaints, with the recurrence rate of less than 1%.                                                                                                                                                                                   Operative Discussions:  Open Carpal Tunnel Release:   The anatomy and physiology of carpal tunnel syndrome was discussed with the patient today.  Increase pressure localized under the transverse carpal ligament can cause pain, numbness, tingling, or dysesthesias within the median nerve distribution as well as feelings of fatigue, clumsiness, or awkwardness.  These symptoms typically occur at night and worse in the morning upon waking.  Eventually, untreated carpal tunnel syndrome can result in weakness and permanent loss of muscle within the thenar compartment of the hand.  Treatment options were discussed with the patient.  Conservative treatment includes nocturnal resting splints to keep the nerve in a neutral position, ergonomic changes within the work or home environment, activity modification, and tendon gliding exercises.  Vitamin B6 one tablet daily over the counter may helpful to reduce  symptoms.  Steroid injections within the carpal canal can help a majority of patients, however this is often self-limited in a majority of patients.  Surgical intervention to divide the transverse carpal ligament typically results in a long-lasting relief of the patient's complaints, with the recurrence rate of less than 1%. The patient has elected to undergo an open carpal tunnel release.  The palmar incision technique was discussed in the office with the patient today.   In the postoperative period, light activities are allowed immediately, driving is allowed when narcotic medication has stopped, and the bandages may be removed and incision may get wet after 2 days.  Heavy activities (lifting more than approximately 10 pounds) will be allowed after follow up appointment in 1-2 weeks.  While night symptoms (waking from sleep, pain, and discomfort in the hands) generally improves rapidly, the numbness and tingling as well as the strength will slowly improve over weeks to months depending on the chronicity and severity of the carpal tunnel syndrome.  Pillar pain and scar discomfort were discussed with the patient which are self-limiting conditions.  The risks of bleeding and infection from the surgery are less than 1%.  Risk of recurrence is approximately 0.5%.  The risks of nerve injury or nerve damage or damage to the blood vessels is approximately 1 in 1200. The patient has an understanding of the above mentioned discussion.The risks and benefits of the procedure were explained to the patient, which include, but are not limited to: Bleeding, infection, recurrence, pain, scar, damage to tendons, damage to nerves, and damage to blood vessels, failure to give desired results and complications related to anesthesia.  These risks, along with alternative conservative treatment options, and postoperative protocols were voiced back and understood by the patient.  All questions were answered to the patient's satisfaction.   The patient agrees to comply with a standard postoperative protocol, and is willing to proceed.  Education was provided via written and auditory forms.  There were no barriers to learning. Written handouts regarding wound care, incision and scar care, and general preoperative information was provided to the patient.  Prior to surgery, the patient may be requested to stop all anti-inflammatory medications.  Prophylactic aspirin, Plavix, and Coumadin may be allowed to be continued.  Medications including vitamin E., ginkgo, and fish oil are requested to be stopped approximately one week prior to surgery.  Hypertensive medications and beta blockers, if taken, s      ____________________________________________________________________________________________________________________________________________      CHIEF COMPLAINT:  Chief Complaint   Patient presents with    Right Hand - Carpal Tunnel    Left Hand - Carpal Tunnel       SUBJECTIVE:  Karine Downs is a 48 y.o. year old LHD female who presents for initial evaluation of bilateral hand pain, numbness, and tingling. Patient states she first noticed symptoms in the left hand in 2019. She underwent an EMG LUE confirming carpal tunnel. Patient wore night time splints and states her symptoms resolved until recently. Patient notes numbness, tingling, and pain to bilateral hands most severe at night and while driving. Symptoms are in index, long, and ring fingers. She describes nocturnal symptoms despite splint use. Weakness and difficulty gripping with left hand.        Pain/symptom timing:  Worse during the day when active  Pain/symptom context:  Worse with activites and work  Pain/symptom modifying factors:  Rest makes better, activities make worse  Pain/symptom associated signs/symptoms: none    Prior treatment   NSAIDsYes   Injections No   Bracing/Orthotics Yes    Physical Therapy No     I have personally reviewed all the relevant PMH, PSH, SH, FH, Medications and  allergies      PAST MEDICAL HISTORY:  Past Medical History:   Diagnosis Date    Internal derangement of knee joint, left     resolved 10/04/2017    Lyme disease     Varicella        PAST SURGICAL HISTORY:  Past Surgical History:   Procedure Laterality Date    MN HYSTEROSCOPY BX ENDOMETRIUM&/POLYPC W/WO D&C N/A 01/31/2020    Procedure: DILATATION AND CURETTAGE (D&C) WITH HYSTEROSCOPY;  Surgeon: Mirlande Fisher MD;  Location: [facility];  Service: Gynecology    MN HYSTEROSCOPY ENDOMETRIAL ABLATION N/A 01/31/2020    Procedure: ABLATION ENDOMETRIAL NOVASURE;  Surgeon: Mirlande Fisher MD;  Location: [facility];  Service: Gynecology    MN LAPAROSCOPY W/RMVL ADNEXAL STRUCTURES Bilateral 01/31/2020    Procedure: SALPINGECTOMY, LAPAROSCOPIC BILATERAL;  Surgeon: Mirlande Fisher MD;  Location: [facility];  Service: Gynecology    MN REMOVAL INTRAUTERINE DEVICE IUD N/A 01/31/2020    Procedure: REMOVAL OF INTRAUTERINE DEVICE (IUD);  Surgeon: Mirlande Fisher MD;  Location: [facility];  Service: Gynecology    WISDOM TOOTH EXTRACTION         FAMILY HISTORY:  Family History   Problem Relation Age of Onset    Thyroid disease Mother     Thyroid disease Sister     Heart disease Maternal Grandmother         cardiac disorder    Thyroid disease Maternal Grandmother     No Known Problems Paternal Grandmother     No Known Problems Maternal Aunt     Thyroid disease Sister        SOCIAL HISTORY:  Social History     Tobacco Use    Smoking status: Every Day     Current packs/day: 0.50     Average packs/day: 0.5 packs/day for 20.0 years (10.0 ttl pk-yrs)     Types: Cigarettes    Smokeless tobacco: Never   Vaping Use    Vaping status: Some Days    Substances: Nicotine, Flavoring   Substance Use Topics    Alcohol use: Yes     Comment: socially    Drug use: No       MEDICATIONS:    Current Outpatient Medications:     Acetaminophen (TYLENOL PO), Take by mouth, Disp: , Rfl:     Ascorbic Acid (VITAMIN C PO), Take by mouth, Disp:  , Rfl:     Cholecalciferol (VITAMIN D3) 1000 units CAPS, Daily, Disp: , Rfl:     Cyanocobalamin (VITAMIN B-12 PO), Take by mouth, Disp: , Rfl:     cyclobenzaprine (FLEXERIL) 5 mg tablet, Take 0.5 tablets (2.5 mg total) by mouth daily at bedtime, Disp: 20 tablet, Rfl: 0    ibuprofen (MOTRIN) 600 mg tablet, Take 1 tablet (600 mg total) by mouth every 6 (six) hours as needed for moderate pain, Disp: 30 tablet, Rfl: 0    MAGNESIUM PO, Take by mouth, Disp: , Rfl:     Misc Natural Products (Cortisol Manager) TABS, Take 1 tablet by mouth every other day, Disp: , Rfl:     Multiple Vitamin (MULTIVITAMIN) tablet, Take 1 tablet by mouth daily, Disp: , Rfl:     Multiple Vitamins-Minerals (AIRBORNE PO), Take by mouth, Disp: , Rfl:     Progesterone 100 MG CAPS, TAKE 100 MG BY MOUTH AT BEDTIME, Disp: 90 capsule, Rfl: 4    ALLERGIES:  Allergies   Allergen Reactions    Penicillins Other (See Comments)     Childhood allergy           REVIEW OF SYSTEMS:  Review of Systems   Constitutional:  Negative for chills and fever.   HENT:  Negative for ear pain and sore throat.    Eyes:  Negative for pain and visual disturbance.   Respiratory:  Negative for cough and shortness of breath.    Cardiovascular:  Negative for chest pain and palpitations.   Gastrointestinal:  Negative for abdominal pain and vomiting.   Genitourinary:  Negative for dysuria and hematuria.   Musculoskeletal:  Negative for arthralgias and back pain.   Skin:  Negative for color change and rash.   Neurological:  Negative for seizures and syncope.   All other systems reviewed and are negative.      VITALS:  There were no vitals filed for this visit.    LABS:  HgA1c:   Lab Results   Component Value Date    HGBA1C 5.1 04/19/2018     BMP:   Lab Results   Component Value Date    CALCIUM 10.1 09/14/2023    K 3.8 09/14/2023    CO2 31 09/14/2023     09/14/2023    BUN 14 09/14/2023    CREATININE 0.94 09/14/2023        _____________________________________________________  PHYSICAL EXAMINATION:  General: well developed and well nourished, alert, oriented times 3, and appears comfortable  Psychiatric: Normal  HEENT: Normocephalic, Atraumatic Trachea Midline, No torticollis  Pulmonary: No audible wheezing or respiratory distress   Abdomen/GI: Non tender, non distended   Cardiovascular: No pitting edema, 2+ radial pulse   Skin: No masses, erythema, lacerations, fluctation, ulcerations  Neurovascular: Sensation Intact to the Median, Ulnar, Radial Nerve, Motor Intact to the Median, Ulnar, Radial Nerve, and Pulses Intact  Musculoskeletal: Normal, except as noted in detailed exam and in HPI.      MUSCULOSKELETAL EXAMINATION:    right Hand -    Patient presents with (no) obvious anatomical deformity  Skin is warm and dry to touch with no signs of erythema, ecchymosis, or infection  No soft tissue swelling or effusion noted  Full FDS, FDP, extensor mechanisms are intact  No rotational deformity with composite finger flexion  (+) phalen's  (-) tinels at the wrist   (+) tinels at the elbow  Demonstrates normal wrist, elbow, and shoulder motion  Forearm compartments are soft and supple  2+ distal radial pulse with brisk capillary refill to the fingers  Radial, median, and ulnar motor and sensory distribution intact  Sensations light to touch intact distally    left Hand -    Patient presents with (no) obvious anatomical deformity  Skin is warm and dry to touch with no signs of erythema, ecchymosis, or infection  No soft tissue swelling or effusion noted  Full FDS, FDP, extensor mechanisms are intact  No rotational deformity with composite finger flexion  Thenar atrophy noted   (+) phalen's  (-) tinels at the wrist  Demonstrates normal wrist, elbow, and shoulder motion  Forearm compartments are soft and supple  2+ distal radial pulse with brisk capillary refill to the fingers  Radial, median, and ulnar motor and sensory distribution  intact  Sensations light to touch intact distally    ___________________________________________________  STUDIES REVIEWED:  2019 L NCS/EMG reviewed c/w left carpal tunnel syndrome, no evidence of cervical radiculopathy or ulnar neuropathy         PROCEDURES PERFORMED:  Procedures  No Procedures performed today    _____________________________________________________      Scribe Attestation      I,:  Lydia Tate am acting as a scribe while in the presence of the attending physician.:       I,:  Annemarie العراقي MD personally performed the services described in this documentation    as scribed in my presence.:

## 2024-02-26 NOTE — PROGRESS NOTES
Annemarie العراقي M.D.  Attending, Orthopaedic Surgery  Hand, Wrist, and Elbow Surgery  [facility]      ORTHOPAEDIC HAND, WRIST, AND ELBOW OFFICE  VISIT       ASSESSMENT/PLAN:      48 y.o. female with bilateral carpal tunnel left worse then right, and suspicion for bilateral cubital tunnel. In regards to cubital tunnel bilateral US of elbow ordered for further evaluation.     In regards to her left carpal tunnel, EMG reviewed, clinical exam performed. Based on patients symptoms and previous treatments a left carpal tunnel release is warranted. If EMG of left elbow confirms cubital tunnel a cubital tunnel release will be performed at the same time. Pre and post operative expectations were reviewed, risks and benefits discussed. Consent was signed in clinic today, she will meet with my surgery scheduler today. We will see her back in clinic post operatively.     The patient verbalized understanding of exam findings and treatment plan. We engaged in the shared decision-making process and treatment options were discussed at length with the patient. Surgical and conservative management discussed today along with risks and benefits.    Diagnoses and all orders for this visit:    Carpal tunnel syndrome of left wrist  -     Cancel: US MSK limited; Future    Cubital tunnel syndrome of both upper extremities  -     Cancel: US MSK limited; Future  -     US MSK limited; Future    Carpal tunnel syndrome on right  -     US MSK limited; Future        Follow Up:  Return for Post op .    To Do Next Visit:    and Re-evaluation of current issue      General Discussions:  Carpal Tunnel Syndrome: The anatomy and physiology of carpal tunnel syndrome was discussed with the patient today.  Increase pressure localized under the transverse carpal ligament can cause pain, numbness, tingling, or dysesthesias within the median nerve distribution as well as feelings of fatigue, clumsiness, or awkwardness.  These symptoms  typically occur at night and worse in the morning upon waking.  Eventually, untreated carpal tunnel syndrome can result in weakness and permanent loss of muscle within the thenar compartment of the hand.  Treatment options were discussed with the patient.  Conservative treatment includes nocturnal resting splints to keep the nerve in a neutral position, ergonomic changes within the work or home environment, activity modification, and tendon gliding exercises. Vitamin B6 one tablet daily over the counter may helpful to reduce symptoms.   Steroid injections within the carpal canal can help a majority of patients, however this is often self-limited in a majority of patients.  Surgical intervention to divide the transverse carpal ligament typically results in a long-lasting relief of the patient's complaints, with the recurrence rate of less than 1%.                                                                                                                                                                                   Operative Discussions:  Open Carpal Tunnel Release:   The anatomy and physiology of carpal tunnel syndrome was discussed with the patient today.  Increase pressure localized under the transverse carpal ligament can cause pain, numbness, tingling, or dysesthesias within the median nerve distribution as well as feelings of fatigue, clumsiness, or awkwardness.  These symptoms typically occur at night and worse in the morning upon waking.  Eventually, untreated carpal tunnel syndrome can result in weakness and permanent loss of muscle within the thenar compartment of the hand.  Treatment options were discussed with the patient.  Conservative treatment includes nocturnal resting splints to keep the nerve in a neutral position, ergonomic changes within the work or home environment, activity modification, and tendon gliding exercises.  Vitamin B6 one tablet daily over the counter may helpful to reduce  symptoms.  Steroid injections within the carpal canal can help a majority of patients, however this is often self-limited in a majority of patients.  Surgical intervention to divide the transverse carpal ligament typically results in a long-lasting relief of the patient's complaints, with the recurrence rate of less than 1%. The patient has elected to undergo an open carpal tunnel release.  The palmar incision technique was discussed in the office with the patient today.   In the postoperative period, light activities are allowed immediately, driving is allowed when narcotic medication has stopped, and the bandages may be removed and incision may get wet after 2 days.  Heavy activities (lifting more than approximately 10 pounds) will be allowed after follow up appointment in 1-2 weeks.  While night symptoms (waking from sleep, pain, and discomfort in the hands) generally improves rapidly, the numbness and tingling as well as the strength will slowly improve over weeks to months depending on the chronicity and severity of the carpal tunnel syndrome.  Pillar pain and scar discomfort were discussed with the patient which are self-limiting conditions.  The risks of bleeding and infection from the surgery are less than 1%.  Risk of recurrence is approximately 0.5%.  The risks of nerve injury or nerve damage or damage to the blood vessels is approximately 1 in 1200. The patient has an understanding of the above mentioned discussion.The risks and benefits of the procedure were explained to the patient, which include, but are not limited to: Bleeding, infection, recurrence, pain, scar, damage to tendons, damage to nerves, and damage to blood vessels, failure to give desired results and complications related to anesthesia.  These risks, along with alternative conservative treatment options, and postoperative protocols were voiced back and understood by the patient.  All questions were answered to the patient's satisfaction.   The patient agrees to comply with a standard postoperative protocol, and is willing to proceed.  Education was provided via written and auditory forms.  There were no barriers to learning. Written handouts regarding wound care, incision and scar care, and general preoperative information was provided to the patient.  Prior to surgery, the patient may be requested to stop all anti-inflammatory medications.  Prophylactic aspirin, Plavix, and Coumadin may be allowed to be continued.  Medications including vitamin E., ginkgo, and fish oil are requested to be stopped approximately one week prior to surgery.  Hypertensive medications and beta blockers, if taken, s      ____________________________________________________________________________________________________________________________________________      CHIEF COMPLAINT:  Chief Complaint   Patient presents with    Right Hand - Carpal Tunnel    Left Hand - Carpal Tunnel       SUBJECTIVE:  Karine Downs is a 48 y.o. year old LHD female who presents for initial evaluation of bilateral hand pain, numbness, and tingling. Patient states she first noticed symptoms in the left hand in 2019. She underwent an EMG LUE confirming carpal tunnel. Patient wore night time splints and states her symptoms resolved until recently. Patient notes numbness, tingling, and pain to bilateral hands most severe at night and while driving. Symptoms are in index, long, and ring fingers. She describes nocturnal symptoms despite splint use. Weakness and difficulty gripping with left hand.        Pain/symptom timing:  Worse during the day when active  Pain/symptom context:  Worse with activites and work  Pain/symptom modifying factors:  Rest makes better, activities make worse  Pain/symptom associated signs/symptoms: none    Prior treatment   NSAIDsYes   Injections No   Bracing/Orthotics Yes    Physical Therapy No     I have personally reviewed all the relevant PMH, PSH, SH, FH, Medications and  allergies      PAST MEDICAL HISTORY:  Past Medical History:   Diagnosis Date    Internal derangement of knee joint, left     resolved 10/04/2017    Lyme disease     Varicella        PAST SURGICAL HISTORY:  Past Surgical History:   Procedure Laterality Date    WI HYSTEROSCOPY BX ENDOMETRIUM&/POLYPC W/WO D&C N/A 01/31/2020    Procedure: DILATATION AND CURETTAGE (D&C) WITH HYSTEROSCOPY;  Surgeon: Mirlande Fisher MD;  Location: [facility];  Service: Gynecology    WI HYSTEROSCOPY ENDOMETRIAL ABLATION N/A 01/31/2020    Procedure: ABLATION ENDOMETRIAL NOVASURE;  Surgeon: Mirlande Fisher MD;  Location: [facility];  Service: Gynecology    WI LAPAROSCOPY W/RMVL ADNEXAL STRUCTURES Bilateral 01/31/2020    Procedure: SALPINGECTOMY, LAPAROSCOPIC BILATERAL;  Surgeon: Mirlande Fisher MD;  Location: [facility];  Service: Gynecology    WI REMOVAL INTRAUTERINE DEVICE IUD N/A 01/31/2020    Procedure: REMOVAL OF INTRAUTERINE DEVICE (IUD);  Surgeon: Mirlande Fisher MD;  Location: [facility];  Service: Gynecology    WISDOM TOOTH EXTRACTION         FAMILY HISTORY:  Family History   Problem Relation Age of Onset    Thyroid disease Mother     Thyroid disease Sister     Heart disease Maternal Grandmother         cardiac disorder    Thyroid disease Maternal Grandmother     No Known Problems Paternal Grandmother     No Known Problems Maternal Aunt     Thyroid disease Sister        SOCIAL HISTORY:  Social History     Tobacco Use    Smoking status: Every Day     Current packs/day: 0.50     Average packs/day: 0.5 packs/day for 20.0 years (10.0 ttl pk-yrs)     Types: Cigarettes    Smokeless tobacco: Never   Vaping Use    Vaping status: Some Days    Substances: Nicotine, Flavoring   Substance Use Topics    Alcohol use: Yes     Comment: socially    Drug use: No       MEDICATIONS:    Current Outpatient Medications:     Acetaminophen (TYLENOL PO), Take by mouth, Disp: , Rfl:     Ascorbic Acid (VITAMIN C PO), Take by mouth, Disp:  , Rfl:     Cholecalciferol (VITAMIN D3) 1000 units CAPS, Daily, Disp: , Rfl:     Cyanocobalamin (VITAMIN B-12 PO), Take by mouth, Disp: , Rfl:     cyclobenzaprine (FLEXERIL) 5 mg tablet, Take 0.5 tablets (2.5 mg total) by mouth daily at bedtime, Disp: 20 tablet, Rfl: 0    ibuprofen (MOTRIN) 600 mg tablet, Take 1 tablet (600 mg total) by mouth every 6 (six) hours as needed for moderate pain, Disp: 30 tablet, Rfl: 0    MAGNESIUM PO, Take by mouth, Disp: , Rfl:     Misc Natural Products (Cortisol Manager) TABS, Take 1 tablet by mouth every other day, Disp: , Rfl:     Multiple Vitamin (MULTIVITAMIN) tablet, Take 1 tablet by mouth daily, Disp: , Rfl:     Multiple Vitamins-Minerals (AIRBORNE PO), Take by mouth, Disp: , Rfl:     Progesterone 100 MG CAPS, TAKE 100 MG BY MOUTH AT BEDTIME, Disp: 90 capsule, Rfl: 4    ALLERGIES:  Allergies   Allergen Reactions    Penicillins Other (See Comments)     Childhood allergy           REVIEW OF SYSTEMS:  Review of Systems   Constitutional:  Negative for chills and fever.   HENT:  Negative for ear pain and sore throat.    Eyes:  Negative for pain and visual disturbance.   Respiratory:  Negative for cough and shortness of breath.    Cardiovascular:  Negative for chest pain and palpitations.   Gastrointestinal:  Negative for abdominal pain and vomiting.   Genitourinary:  Negative for dysuria and hematuria.   Musculoskeletal:  Negative for arthralgias and back pain.   Skin:  Negative for color change and rash.   Neurological:  Negative for seizures and syncope.   All other systems reviewed and are negative.      VITALS:  There were no vitals filed for this visit.    LABS:  HgA1c:   Lab Results   Component Value Date    HGBA1C 5.1 04/19/2018     BMP:   Lab Results   Component Value Date    CALCIUM 10.1 09/14/2023    K 3.8 09/14/2023    CO2 31 09/14/2023     09/14/2023    BUN 14 09/14/2023    CREATININE 0.94 09/14/2023        _____________________________________________________  PHYSICAL EXAMINATION:  General: well developed and well nourished, alert, oriented times 3, and appears comfortable  Psychiatric: Normal  HEENT: Normocephalic, Atraumatic Trachea Midline, No torticollis  Pulmonary: No audible wheezing or respiratory distress   Abdomen/GI: Non tender, non distended   Cardiovascular: No pitting edema, 2+ radial pulse   Skin: No masses, erythema, lacerations, fluctation, ulcerations  Neurovascular: Sensation Intact to the Median, Ulnar, Radial Nerve, Motor Intact to the Median, Ulnar, Radial Nerve, and Pulses Intact  Musculoskeletal: Normal, except as noted in detailed exam and in HPI.      MUSCULOSKELETAL EXAMINATION:    right Hand -    Patient presents with (no) obvious anatomical deformity  Skin is warm and dry to touch with no signs of erythema, ecchymosis, or infection  No soft tissue swelling or effusion noted  Full FDS, FDP, extensor mechanisms are intact  No rotational deformity with composite finger flexion  (+) phalen's  (-) tinels at the wrist   (+) tinels at the elbow  Demonstrates normal wrist, elbow, and shoulder motion  Forearm compartments are soft and supple  2+ distal radial pulse with brisk capillary refill to the fingers  Radial, median, and ulnar motor and sensory distribution intact  Sensations light to touch intact distally    left Hand -    Patient presents with (no) obvious anatomical deformity  Skin is warm and dry to touch with no signs of erythema, ecchymosis, or infection  No soft tissue swelling or effusion noted  Full FDS, FDP, extensor mechanisms are intact  No rotational deformity with composite finger flexion  Thenar atrophy noted   (+) phalen's  (-) tinels at the wrist  Demonstrates normal wrist, elbow, and shoulder motion  Forearm compartments are soft and supple  2+ distal radial pulse with brisk capillary refill to the fingers  Radial, median, and ulnar motor and sensory distribution  intact  Sensations light to touch intact distally    ___________________________________________________  STUDIES REVIEWED:  2019 L NCS/EMG reviewed c/w left carpal tunnel syndrome, no evidence of cervical radiculopathy or ulnar neuropathy         PROCEDURES PERFORMED:  Procedures  No Procedures performed today    _____________________________________________________      Scribe Attestation      I,:  Lydia Tate am acting as a scribe while in the presence of the attending physician.:       I,:  Annemarie العراقي MD personally performed the services described in this documentation    as scribed in my presence.:

## 2024-02-28 ENCOUNTER — HOSPITAL ENCOUNTER (OUTPATIENT)
Dept: ULTRASOUND IMAGING | Facility: HOSPITAL | Age: 49
Discharge: HOME | End: 2024-02-28
Attending: SURGERY
Payer: COMMERCIAL

## 2024-02-28 DIAGNOSIS — G56.23 CUBITAL TUNNEL SYNDROME OF BOTH UPPER EXTREMITIES: ICD-10-CM

## 2024-02-28 DIAGNOSIS — G56.01 CARPAL TUNNEL SYNDROME ON RIGHT: ICD-10-CM

## 2024-02-28 PROCEDURE — 76882 US LMTD JT/FCL EVL NVASC XTR: CPT

## 2024-03-01 ENCOUNTER — ANESTHESIA EVENT (OUTPATIENT)
Age: 49
End: 2024-03-01
Payer: COMMERCIAL

## 2024-03-07 PROBLEM — G56.23 CUBITAL TUNNEL SYNDROME OF BOTH UPPER EXTREMITIES: Status: ACTIVE | Noted: 2024-03-07

## 2024-03-07 NOTE — DISCHARGE INSTR - AVS FIRST PAGE
Post Operative Instructions    You have had surgery on your arm today, please read and follow the information below:  Elevate your hand above your elbow during the next 24-48 hours to help with swelling.  Place your hand and arm over your head with motion at your shoulder three times a day.  Do not apply any cream/ointment/oil to your incisions including antibiotics.  Do not soak your hands in standing water (dishwater, tubs, Jacuzzi's, pools, etc.) until given permission (typically 2-3 weeks after injury)    Call the office if you notice any:  Increased numbness or tingling of your hand or fingers that is not relieved with elevation.  Increasing pain that is not controlled with medication.  Difficulty chewing, breathing, swallowing.  Chest pains or shortness of breath.  Fever over 101.4 degrees.    Bandage: Please keep bandages clean and dry. Remove bandage after 5 days. Once bandages are removed you may wash hands with soap and water. Short showers are okay as well, but please avoid soaking the hand as described above (ie no pools, baths, dirty dish water, hot tubs, ocean/lake water, etc). Sutures will be removed in the office at your first follow up visit, please do not remove them yourself.    Please do NOT put any topical agents on the surgical wound including neosporin, peroxide, tea tree oil, vitamin E, etc. as these can delay wound healing.    Motion: Move fingers into a fist 5 times a day, DO NOT move any splinted fingers.    Weight bearing status: Avoid heavy lifting (>5 pounds) with the extremity that was operated on until follow up appointment. Normal activities of daily living are OK.    Ice: Ice for 10 minutes every hour as needed for swelling x 24 hours.    Sling: Please use your sling while your arm is numb from the block. When your arm is FULLY awake again, you no longer need this and may use your sling as needed for comfort. While using the sling, make sure to move your shoulder throughout the day  to prevent stiffness here.     Pain medication:   Naproxen 220 mg two times a day (this is over the counter!)  *do not take this medication if you were told by your doctor that you cannot take anti-inflammatories or NSAIDS  Tylenol Extended Release 650 mg every 8 hours (this is over the counter!)   Norco/Hydrocodone one tab every 6 hours ONLY AS NEEDED for severe pain        Follow-up Appointment: 10-14 days with Dr العراقي        Please call the office if you have any questions or concerns regarding your post-operative care.

## 2024-03-07 NOTE — PRE-PROCEDURE INSTRUCTIONS
Pre-Surgery Instructions:   Medication Instructions    Ascorbic Acid (VITAMIN C PO) Avoid 1 week prior to surgery  -took 3/6    Cholecalciferol (VITAMIN D3) 1000 units CAPS Avoid 1 week prior to surgery  -took 3/6    Cyanocobalamin (VITAMIN B-12 PO) Avoid 1 week prior to surgery  -took 3/6    MAGNESIUM PO Avoid 1 week prior to surgery  -took 3/6    Misc Natural Products (Cortisol Manager) TABS Avoid 1 week prior to surgery  -took 3/6    Multiple Vitamin (MULTIVITAMIN) tablet Avoid 1 week prior to surgery  -took 3/6    Multiple Vitamins-Minerals (AIRBORNE PO) Avoid 1 week prior to surgery  -took 3/6    Progesterone 100 MG CAPS Do not take day of surgery; continue as prescribed excluding DOS     Medication instructions for day surgery reviewed. Please use only a sip of water to take your instructed medications. Avoid all over the counter vitamins, supplements and NSAIDS for one week prior to surgery per anesthesia guidelines. Tylenol is ok to take as needed.     You will receive a call one business day prior to surgery with an arrival time and hospital directions. If your surgery is scheduled on a Monday, the hospital will be calling you on the Friday prior to your surgery. If you have not heard from anyone by 8pm, please call the hospital supervisor through the hospital  at [phone]. ([facility] [phone] or [facility] [phone]).    Do not eat or drink anything after midnight the night before your surgery, including candy, mints, lifesavers, or chewing gum. Do not drink alcohol 24hrs before your surgery. Try not to smoke at least 24hrs before your surgery.       Follow the pre surgery showering instructions as listed in the “My Surgical Experience Booklet” or otherwise provided by your surgeon's office. Do not use a blade to shave the surgical area 1 week before surgery. It is okay to use a clean electric clippers up to 24 hours before surgery. Do not apply any lotions, creams, including makeup,  cologne, deodorant, or perfumes after showering on the day of your surgery. Do not use dry shampoo, hair spray, hair gel, or any type of hair products.     No contact lenses, eye make-up, or artificial eyelashes. Remove nail polish, including gel polish, and any artificial, gel, or acrylic nails if possible. Remove all jewelry including rings and body piercing jewelry.     Wear causal clothing that is easy to take on and off. Consider your type of surgery.    Keep any valuables, jewelry, piercings at home. Please bring any specially ordered equipment (sling, braces) if indicated.    Arrange for a responsible person to drive you to and from the hospital on the day of your surgery. Please confirm the visitor policy for the day of your procedure when you receive your phone call with an arrival time.     Call the surgeon's office with any new illnesses, exposures, or additional questions prior to surgery.    Please reference your “My Surgical Experience Booklet” for additional information to prepare for your upcoming surgery.

## 2024-03-08 ENCOUNTER — HOSPITAL ENCOUNTER (OUTPATIENT)
Age: 49
Setting detail: OUTPATIENT SURGERY
Discharge: HOME | End: 2024-03-08
Attending: SURGERY | Admitting: SURGERY
Payer: COMMERCIAL

## 2024-03-08 ENCOUNTER — ANESTHESIA (OUTPATIENT)
Age: 49
End: 2024-03-08
Payer: COMMERCIAL

## 2024-03-08 VITALS
OXYGEN SATURATION: 96 % | WEIGHT: 127.4 LBS | HEART RATE: 92 BPM | SYSTOLIC BLOOD PRESSURE: 138 MMHG | DIASTOLIC BLOOD PRESSURE: 84 MMHG | BODY MASS INDEX: 22.57 KG/M2 | TEMPERATURE: 98.3 F | HEIGHT: 63 IN | RESPIRATION RATE: 22 BRPM

## 2024-03-08 DIAGNOSIS — Z47.89 AFTERCARE FOLLOWING SURGERY OF THE MUSCULOSKELETAL SYSTEM: ICD-10-CM

## 2024-03-08 DIAGNOSIS — G56.23 CUBITAL TUNNEL SYNDROME OF BOTH UPPER EXTREMITIES: Primary | ICD-10-CM

## 2024-03-08 DIAGNOSIS — G56.02 CARPAL TUNNEL SYNDROME OF LEFT WRIST: ICD-10-CM

## 2024-03-08 LAB
EXT PREGNANCY TEST URINE: NEGATIVE
EXT. CONTROL: NORMAL

## 2024-03-08 PROCEDURE — 15736 MUSCLE-SKIN GRAFT ARM: CPT | Performed by: PHYSICIAN ASSISTANT

## 2024-03-08 PROCEDURE — 15736 MUSCLE-SKIN GRAFT ARM: CPT | Performed by: SURGERY

## 2024-03-08 PROCEDURE — 64718 REVISE ULNAR NERVE AT ELBOW: CPT | Performed by: SURGERY

## 2024-03-08 PROCEDURE — 64718 REVISE ULNAR NERVE AT ELBOW: CPT | Performed by: PHYSICIAN ASSISTANT

## 2024-03-08 PROCEDURE — 64721 CARPAL TUNNEL SURGERY: CPT | Performed by: SURGERY

## 2024-03-08 PROCEDURE — 64721 CARPAL TUNNEL SURGERY: CPT | Performed by: PHYSICIAN ASSISTANT

## 2024-03-08 PROCEDURE — 81025 URINE PREGNANCY TEST: CPT | Performed by: SURGERY

## 2024-03-08 PROCEDURE — C9290 INJ, BUPIVACAINE LIPOSOME: HCPCS | Performed by: STUDENT IN AN ORGANIZED HEALTH CARE EDUCATION/TRAINING PROGRAM

## 2024-03-08 RX ORDER — MAGNESIUM HYDROXIDE 1200 MG/15ML
LIQUID ORAL AS NEEDED
Status: DISCONTINUED | OUTPATIENT
Start: 2024-03-08 | End: 2024-03-08 | Stop reason: HOSPADM

## 2024-03-08 RX ORDER — SODIUM CHLORIDE 9 MG/ML
125 INJECTION, SOLUTION INTRAVENOUS CONTINUOUS
Status: DISCONTINUED | OUTPATIENT
Start: 2024-03-08 | End: 2024-03-08 | Stop reason: HOSPADM

## 2024-03-08 RX ORDER — CHLORHEXIDINE GLUCONATE 4 G/100ML
SOLUTION TOPICAL DAILY PRN
Status: DISCONTINUED | OUTPATIENT
Start: 2024-03-08 | End: 2024-03-08 | Stop reason: HOSPADM

## 2024-03-08 RX ORDER — FENTANYL CITRATE 50 UG/ML
INJECTION, SOLUTION INTRAMUSCULAR; INTRAVENOUS AS NEEDED
Status: DISCONTINUED | OUTPATIENT
Start: 2024-03-08 | End: 2024-03-08

## 2024-03-08 RX ORDER — ONDANSETRON 2 MG/ML
4 INJECTION INTRAMUSCULAR; INTRAVENOUS ONCE AS NEEDED
Status: DISCONTINUED | OUTPATIENT
Start: 2024-03-08 | End: 2024-03-08 | Stop reason: HOSPADM

## 2024-03-08 RX ORDER — HYDROCODONE BITARTRATE AND ACETAMINOPHEN 5; 325 MG/1; MG/1
1 TABLET ORAL EVERY 6 HOURS PRN
Status: DISCONTINUED | OUTPATIENT
Start: 2024-03-08 | End: 2024-03-08 | Stop reason: HOSPADM

## 2024-03-08 RX ORDER — DEXAMETHASONE SODIUM PHOSPHATE 4 MG/ML
INJECTION, SOLUTION INTRA-ARTICULAR; INTRALESIONAL; INTRAMUSCULAR; INTRAVENOUS; SOFT TISSUE
Status: COMPLETED | OUTPATIENT
Start: 2024-03-08 | End: 2024-03-08

## 2024-03-08 RX ORDER — HYDROCODONE BITARTRATE AND ACETAMINOPHEN 5; 325 MG/1; MG/1
1 TABLET ORAL EVERY 6 HOURS PRN
Qty: 12 TABLET | Refills: 0 | Status: SHIPPED | OUTPATIENT
Start: 2024-03-08 | End: 2024-03-15

## 2024-03-08 RX ORDER — BUPIVACAINE HYDROCHLORIDE 5 MG/ML
INJECTION, SOLUTION EPIDURAL; INTRACAUDAL
Status: DISCONTINUED | OUTPATIENT
Start: 2024-03-08 | End: 2024-03-08

## 2024-03-08 RX ORDER — MIDAZOLAM HYDROCHLORIDE 2 MG/2ML
INJECTION, SOLUTION INTRAMUSCULAR; INTRAVENOUS
Status: COMPLETED | OUTPATIENT
Start: 2024-03-08 | End: 2024-03-08

## 2024-03-08 RX ORDER — FENTANYL CITRATE/PF 50 MCG/ML
25 SYRINGE (ML) INJECTION
Status: COMPLETED | OUTPATIENT
Start: 2024-03-08 | End: 2024-03-08

## 2024-03-08 RX ORDER — BUPIVACAINE HYDROCHLORIDE 5 MG/ML
INJECTION, SOLUTION EPIDURAL; INTRACAUDAL
Status: COMPLETED | OUTPATIENT
Start: 2024-03-08 | End: 2024-03-08

## 2024-03-08 RX ORDER — PROPOFOL 10 MG/ML
INJECTION, EMULSION INTRAVENOUS AS NEEDED
Status: DISCONTINUED | OUTPATIENT
Start: 2024-03-08 | End: 2024-03-08

## 2024-03-08 RX ORDER — CEFAZOLIN SODIUM 2 G/50ML
2000 SOLUTION INTRAVENOUS ONCE
Status: COMPLETED | OUTPATIENT
Start: 2024-03-08 | End: 2024-03-08

## 2024-03-08 RX ORDER — CHLORHEXIDINE GLUCONATE ORAL RINSE 1.2 MG/ML
15 SOLUTION DENTAL ONCE
Status: COMPLETED | OUTPATIENT
Start: 2024-03-08 | End: 2024-03-08

## 2024-03-08 RX ADMIN — FENTANYL CITRATE 50 MCG: 50 INJECTION INTRAMUSCULAR; INTRAVENOUS at 08:48

## 2024-03-08 RX ADMIN — PROPOFOL 80 MCG/KG/MIN: 10 INJECTION, EMULSION INTRAVENOUS at 08:40

## 2024-03-08 RX ADMIN — BUPIVACAINE HYDROCHLORIDE 10 ML: 5 INJECTION, SOLUTION EPIDURAL; INTRACAUDAL at 11:00

## 2024-03-08 RX ADMIN — CEFAZOLIN SODIUM 2000 MG: 2 SOLUTION INTRAVENOUS at 08:39

## 2024-03-08 RX ADMIN — SODIUM CHLORIDE 125 ML/HR: 0.9 INJECTION, SOLUTION INTRAVENOUS at 07:14

## 2024-03-08 RX ADMIN — BUPIVACAINE HYDROCHLORIDE 10 ML: 5 INJECTION, SOLUTION EPIDURAL; INTRACAUDAL; PERINEURAL at 07:55

## 2024-03-08 RX ADMIN — SODIUM CHLORIDE 125 ML/HR: 0.9 INJECTION, SOLUTION INTRAVENOUS at 09:55

## 2024-03-08 RX ADMIN — FENTANYL CITRATE 25 MCG: 50 INJECTION INTRAMUSCULAR; INTRAVENOUS at 10:20

## 2024-03-08 RX ADMIN — MIDAZOLAM 2 MG: 1 INJECTION INTRAMUSCULAR; INTRAVENOUS at 07:50

## 2024-03-08 RX ADMIN — CHLORHEXIDINE GLUCONATE 0.12% ORAL RINSE 15 ML: 1.2 LIQUID ORAL at 07:15

## 2024-03-08 RX ADMIN — FENTANYL CITRATE 25 MCG: 50 INJECTION INTRAMUSCULAR; INTRAVENOUS at 10:33

## 2024-03-08 RX ADMIN — DEXAMETHASONE SODIUM PHOSPHATE 4 MG: 4 INJECTION INTRA-ARTICULAR; INTRALESIONAL; INTRAMUSCULAR; INTRAVENOUS; SOFT TISSUE at 07:55

## 2024-03-08 RX ADMIN — FENTANYL CITRATE 25 MCG: 50 INJECTION INTRAMUSCULAR; INTRAVENOUS at 10:45

## 2024-03-08 RX ADMIN — FENTANYL CITRATE 25 MCG: 50 INJECTION INTRAMUSCULAR; INTRAVENOUS at 09:55

## 2024-03-08 RX ADMIN — PROPOFOL 50 MG: 10 INJECTION, EMULSION INTRAVENOUS at 09:35

## 2024-03-08 RX ADMIN — FENTANYL CITRATE 50 MCG: 50 INJECTION INTRAMUSCULAR; INTRAVENOUS at 08:59

## 2024-03-08 RX ADMIN — PROPOFOL 50 MG: 10 INJECTION, EMULSION INTRAVENOUS at 08:39

## 2024-03-08 RX ADMIN — PROPOFOL 20 MG: 10 INJECTION, EMULSION INTRAVENOUS at 08:47

## 2024-03-08 RX ADMIN — BUPIVACAINE 20 ML: 13.3 INJECTION, SUSPENSION, LIPOSOMAL INFILTRATION at 07:55

## 2024-03-08 NOTE — ANESTHESIA POSTPROCEDURE EVALUATION
Post-Op Assessment Note    CV Status:  Stable  Pain Score: 0    Pain management: adequate       Mental Status:  Awake   Hydration Status:  Euvolemic   PONV Controlled:  Controlled   Airway Patency:  Patent     Post Op Vitals Reviewed: Yes    No anethesia notable event occurred.    Staff: CRNA               /74 (03/08/24 0945)    Temp 99 °F (37.2 °C) (03/08/24 0945)    Pulse 94 (03/08/24 0945)   Resp 17 (03/08/24 0945)    SpO2 97 % (03/08/24 0945)

## 2024-03-08 NOTE — INTERVAL H&P NOTE
H&P reviewed. After examining the patient I find no changes in the patients condition since the H&P was written . US reviewed . B/L ulnar nerve enlargement with some subluxation on left side only c/ w cubital tunnel B/L . Will proceed with left carpal and cubital tunnel releases , possible anterior transposition ulnar nerve with pedicle based adipose flap. Patient understands and is agreeable

## 2024-03-08 NOTE — ANESTHESIA PROCEDURE NOTES
Peripheral Block    Patient location during procedure: holding area  Start time: 3/8/2024 7:55 AM  Reason for block: at surgeon's request and post-op pain management  Staffing  Performed by: Ricky Huertas MD  Authorized by: Ricky Huertas MD    Preanesthetic Checklist  Completed: patient identified, IV checked, site marked, risks and benefits discussed, surgical consent, monitors and equipment checked, pre-op evaluation and timeout performed  Peripheral Block  Prep: ChloraPrep  Patient monitoring: frequent blood pressure checks, continuous pulse oximetry and heart rate  Block type: Supraclavicular  Laterality: left  Injection technique: single-shot  Procedures: ultrasound guided, Ultrasound guidance required for the procedure to increase accuracy and safety of medication placement and decrease risk of complications.  Ultrasound permanent image savedbupivacaine (PF) (MARCAINE) 0.5 % injection 20 mL - Perineural   10 mL - 3/8/2024 7:55:00 AM  bupivacaine liposomal (EXPAREL) 1.3 % injection 20 mL - Perineural   20 mL - 3/8/2024 7:55:00 AM  dexamethasone (DECADRON) injection 4 mg - Perineural   4 mg - 3/8/2024 7:55:00 AM  midazolam (VERSED) injection 0.5 mg - Intravenous   2 mg - 3/8/2024 7:50:00 AM  Needle  Needle type: Stimuplex   Needle gauge: 20 G  Needle length: 4 in  Needle localization: anatomical landmarks and ultrasound guidance  Assessment  Injection assessment: incremental injection, frequent aspiration, injected with ease, negative aspiration, negative for heart rate change, no paresthesia on injection, no symptoms of intraneural/intravenous injection and needle tip visualized at all times  Paresthesia pain: none  Post-procedure:  site cleaned  patient tolerated the procedure well with no immediate complications

## 2024-03-08 NOTE — OP NOTE
OPERATIVE REPORT  PATIENT NAME: Karine Downs  :  1975  MRN: [id number]  Pt Location: [facility]    SURGERY DATE: 24    Surgeons and Role:     * Annemarie العراقي MD - Primary     * Emy Saez PA-C - Assisting    Pre-Op Diagnosis:  Carpal tunnel syndrome of left wrist [G56.02]  Cubital tunnel syndrome of both upper extremities [G56.23]    Post-Op Diagnosis Codes:     * Carpal tunnel syndrome of left wrist [G56.02]     * Cubital tunnel syndrome of both upper extremities [G56.23]    Procedure(s):  RELEASE CUBITAL TUNNEL (Left)  RELEASE CARPAL TUNNEL (Left)  Anterior transposition of the ulnar nerve  Pedicle based fat flap for anterior transposition of the elbow (11613)    Specimen(s):  No specimens collected during this procedure.    Estimated Blood Loss:   Minimal    Anesthesia Type:   Regional with Sedation    IMPLANTS:  * No implants in log *    PERIOPERATIVE ANTIBIOTICS:    cefazolin, 2 grams    Tourniquet Time: 39 min medium hemaclear            Operative Indications:  The patient has a history of Carpal Tunnel Syndrome  left and Cubital Tunnel Syndrome, with subluxation  left that was recalcitrant to conservative management.  The decision was made to bring the patient to the operating room for Open Carpal Tunnel Release  left and Cubital Tunnel Release  left, anterior transposition with pedicle based adipose flap .  Risks of the procedure were explained which include, but are not limited to bleeding; infection; damage to nerves, arteries,veins, tendons; scar; pain; need for reoperation; failure to give desired result; and risks of anaesthesia.  All questions were answered to satisfaction and they were willing to proceed.         Operative Findings:  Hypertrophy transverse carpal ligament  Ulnar nerve subluxation prior to release  Right dislocation of the medial epicondyle after release necessitating anterior transposition    Complications:   None    Procedure and Technique:  After the patient, site,  and procedure were identified, the patient was brought into the operating room in a supine position.  Regional anaesthesia and sedation were provided.  The  left upper extremity was then prepped and drapped in a normal, sterile, orthopedic fashion.    A medium Hemoclear was applied in a sterile fashion    An anterior approach to the carpal tunnel was undertaken. A 1.2 cm incision was made in line with the fourth digit, proximal to Grimes's line.  The skin and the subcutaneous tissue were sharply incised.  Under loupe magnification, dissection was carried down to the palmar fascia and it was incised. The transverse carpal ligament was visualized and  Released distally with a #64 blade. A freer was was passed above and below the TCL in a retrograde fashion, freeing up any adhesions. A Knife Lite was used to release the proximal portion of the transverse carpal ligament under direct visualization.  Distally the superficial arch was identified.  A complete release was carried out and exploration of the carpal canal revealed no significant tenosynovitis. The median nerve and its branches were intact.        A 4 cm Incision was made with a 15 blade between medial epicondyle and olecranon.  A  branch of the MABC was identified distally and protected.  The ulnar nerve was identified at the level of the cubital tunnel.  The nerve was found to be subluxed.  The Ulnar nerve was traced 1st traced proximally.  Proximal release was performed under direct visualization  the overlying fascia was released with a scissor up to the level of the intermuscular septum which was incised.  Next dissection was taken proximally both with a scissor and with a 64 blade.  Care was taken preserve any crossing branches of the medial antebrachial cutaneous nerve.  Decompression was taken around the olecranon, and then to the FCU fascia.  The super all facial layer of the fascia was released with a knife and the 2 bellies of the FCU spread gently  with a scissor.  The deep fascia of the FCU was identified and with the nerve protected all times incised.  Release was taken just past the level of the 1st motor branch to the FCU.  Once a complete release was verified,  the Elbow was taken through a full range of motion and the nerve was found to completely sublux anteriorly over the medial epicondyle.  After the release, it was appreciated that the nerve had been significantly constricted at the level of the deep FCU fascia with flattening of the nerve     Further release was undertaken proximally. The intermuscular septum was identified and excised to prevent constriction after on the transposed nerve. The nerve was dissected from the underlying vasculature.    A pedicle, fascial fat flap at the medial elbow using the deep subcutaneous tissue based on the lateral perforating vessels was isolated from the superficial subcutaneous tissue layer using the super fascial fascial planes.  The flap was noted to be well-perfused following tourniquet deflation.  The flap was transposed posteriorly to the ulnar nerve after anterior transposition of the nerve was performed.  The flap was secured to the posterior subcutaneous tissue using an interrupted 3 0 Vicryl suture with care as to not incorporate the ulnar nerve or superficial sensory nerves within the repair site.  The ulnar nerve was visualized directly to glide freely without tethering or subluxation between the subcutaneous flap and the overlying subcutaneous tissues with elbow flexion and extension.      At the completion of the procedure, hemostasis was obtained with cautery and direct pressure.  The wounds were copiously irrigated with sterile solution.  The wounds were closed with Prolene, Monocryl, and Steri-strips.  Sterile dressings were applied, including Xeroform, gauze, tweeners, webril, ACE and Sling.  Please note, all sponge, needle, and instrument counts were correct prior to closure.  Domitila  magnification was utilized.  The patient tolerated the procedure well.     I was present for the entire procedure., A qualified resident physician was not available., and A physician assistant was required during the procedure for retraction, tissue handling, dissection and suturing.   The aid of Emy MENDENHALL was required in the approach dissection and handling of soft tissues with retraction and with closure during this cubital tunnel release.  Her aide was instrumental in protecting the medial antebrachial cutaneous branches  during the approach as well as a allowing  for adequate visualization through a minimally invasive incision      Patient Disposition:  PACU     SIGNATURE: Annemarie العراقي MD  DATE: 03/08/24  TIME: 9:27 AM

## 2024-03-08 NOTE — ANESTHESIA PREPROCEDURE EVALUATION
Procedure:  RELEASE CUBITAL TUNNEL (Left: Elbow)  RELEASE CARPAL TUNNEL (Left: Wrist)    Relevant Problems   CARDIO   (+) Atypical chest pain   (+) Migraine headache      GI/HEPATIC   (+) Esophageal reflux      NEURO/PSYCH   (+) Migraine headache      Nervous and Auditory   (+) Carpal tunnel syndrome of left wrist   (+) Cubital tunnel syndrome of both upper extremities      Genitourinary   (+) Bartholin cyst      Other   (+) Tobacco dependence      Lab Results   Component Value Date    SODIUM 137 09/14/2023    K 3.8 09/14/2023     09/14/2023    CO2 31 09/14/2023    AGAP 5 09/14/2023    BUN 14 09/14/2023    CREATININE 0.94 09/14/2023    GLUC 115 09/14/2023    GLUF 77 12/31/2022    CALCIUM 10.1 09/14/2023    AST 16 09/14/2023    ALT 12 09/14/2023    ALKPHOS 72 09/14/2023    TP 7.5 09/14/2023    TBILI 0.44 09/14/2023    EGFR 73 09/14/2023     Lab Results   Component Value Date    WBC 9.88 09/14/2023    HGB 15.3 09/14/2023    HCT 44.7 09/14/2023    MCV 97 09/14/2023     09/14/2023            Anesthesia Plan  ASA Score- 2     Anesthesia Type- regional with ASA Monitors.         Additional Monitors:     Airway Plan:            Plan Factors-Exercise tolerance (METS): >4 METS.    Chart reviewed. EKG reviewed. Imaging results reviewed. Existing labs reviewed. Patient summary reviewed.                  Induction- intravenous.    Postoperative Plan-     Informed Consent-

## 2024-03-08 NOTE — ANESTHESIA PROCEDURE NOTES
Peripheral Block    Patient location during procedure: PACU  Start time: 3/8/2024 11:00 AM  Reason for block: at surgeon's request and post-op pain management  Staffing  Performed by: Ricky Huertas MD  Authorized by: Ricky Huertas MD    Preanesthetic Checklist  Completed: patient identified, IV checked, site marked, risks and benefits discussed, surgical consent, monitors and equipment checked, pre-op evaluation and timeout performed  Peripheral Block  Patient position: sitting  Prep: ChloraPrep  Patient monitoring: frequent blood pressure checks, continuous pulse oximetry and heart rate  Block type: Supraclavicular  Laterality: left  Injection technique: single-shot  Procedures: ultrasound guided, Ultrasound guidance required for the procedure to increase accuracy and safety of medication placement and decrease risk of complications.  Ultrasound permanent image savedbupivacaine (PF) (MARCAINE) 0.5 % injection 20 mL - Perineural   10 mL - 3/8/2024 11:00:00 AM  Needle  Needle type: Stimuplex   Needle gauge: 22 G  Needle length: 2 in  Needle localization: anatomical landmarks and ultrasound guidance  Assessment  Injection assessment: incremental injection, frequent aspiration, injected with ease, negative aspiration, negative for heart rate change, no paresthesia on injection, no symptoms of intraneural/intravenous injection and needle tip visualized at all times  Paresthesia pain: none  Post-procedure:  site cleaned  patient tolerated the procedure well with no immediate complications

## 2024-03-15 ENCOUNTER — ANNUAL EXAM (OUTPATIENT)
Dept: OBGYN CLINIC | Facility: CLINIC | Age: 49
End: 2024-03-15
Payer: COMMERCIAL

## 2024-03-15 VITALS
WEIGHT: 127.8 LBS | DIASTOLIC BLOOD PRESSURE: 80 MMHG | HEIGHT: 64 IN | SYSTOLIC BLOOD PRESSURE: 120 MMHG | BODY MASS INDEX: 21.82 KG/M2

## 2024-03-15 DIAGNOSIS — Z01.419 ENCOUNTER FOR GYNECOLOGICAL EXAMINATION WITHOUT ABNORMAL FINDING: Primary | ICD-10-CM

## 2024-03-15 DIAGNOSIS — Z12.11 COLON CANCER SCREENING: ICD-10-CM

## 2024-03-15 DIAGNOSIS — Z12.31 ENCOUNTER FOR SCREENING MAMMOGRAM FOR MALIGNANT NEOPLASM OF BREAST: ICD-10-CM

## 2024-03-15 PROCEDURE — S0612 ANNUAL GYNECOLOGICAL EXAMINA: HCPCS | Performed by: OBSTETRICS & GYNECOLOGY

## 2024-03-15 RX ORDER — GUAIFENESIN/EPHEDRINE HCL 200-12.5MG
TABLET ORAL
COMMUNITY
Start: 2023-11-01

## 2024-03-15 RX ORDER — VIT C/B6/B5/MAGNESIUM/HERB 173 50-5-6-5MG
CAPSULE ORAL
COMMUNITY
Start: 2024-02-20

## 2024-03-15 NOTE — PROGRESS NOTES
Diagnoses and all orders for this visit:    Encounter for gynecological examination without abnormal finding    Encounter for screening mammogram for malignant neoplasm of breast  -     Mammo screening bilateral w 3d & cad; Future    Colon cancer screening  -     Ambulatory referral to Gastroenterology; Future    Other orders  -     5-Hydroxytryptophan (5-HTP) 100 MG CAPS  -     GLUCOSAMINE-CHONDROITIN-MSM-D PO  -     Turmeric (QC Tumeric Complex) 500 MG CAPS        Perineal hygiene reviewed   Weight bearing exercises minium of 150 mins/weekly advised.   Kegel exercises recommended daily, see AVS for instructions and recommendations  SBE encouraged, ASCCP guidelines reviewed. Condoms encouraged with all sexual activity to prevent STI's.   Gardisil vaccines recommended up to age 45  Calcium/ Vit D dietary requirements discussed,   Advised to call with any issues,  all concerns & questions addressed.   See provided information in your after visit summary     F/U Annually and PRN      Health Maintenance:    Last PAP: 2022  Neg/Neg  Next PAP Due:    Last Mammogram: 2022    Life time Nahum Schilling % 10.96, Density D extremely dense , Bi-Rads 2 Benign  Next Mammogram: order given    Last Colonoscopy: Not on file    advised age 47, referral given     Subjective    CC: Yearly Exam      Karine Downs is a 48 y.o. female here for an annual exam.   GYN hx includes:  breast implants 2021, ablation, Tubal, Bartholin's cyst , hysteroscopy, endometrial biopsy, salpingectomy abnormal uterine bleeding,     No personal Hx of breast, cervical, ovarian or colon CA.   Family hx of:  No GYN cancers  Medically stable, reports no changes in medical Hx, follows with PMD  Recent carpal tunnel release left hand   Daily smoker     No LMP recorded (lmp unknown). Patient has had an ablation.  Her menstrual cycles are rare.   Mentions night sweats. 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   She is not sexually active.(  passed away 2 years ago)    Her current method of contraception includes  tubal. Denies any issues with her BCM.  She does not want STD testing today.    Son 24     Works in [employer] for a [employer]     Past Medical History:   Diagnosis Date    Internal derangement of knee joint, left     resolved 10/04/2017    Lyme disease     PONV (postoperative nausea and vomiting)     Varicella      Past Surgical History:   Procedure Laterality Date    NM HYSTEROSCOPY BX ENDOMETRIUM&/POLYPC W/WO D&C N/A 01/31/2020    Procedure: DILATATION AND CURETTAGE (D&C) WITH HYSTEROSCOPY;  Surgeon: Mirlande Fisher MD;  [facility];  Service: Gynecology    NM HYSTEROSCOPY ENDOMETRIAL ABLATION N/A 01/31/2020    Procedure: ABLATION ENDOMETRIAL NOVASURE;  Surgeon: Mirlande Fisher MD;  [facility];  Service: Gynecology    NM LAPAROSCOPY W/RMVL ADNEXAL STRUCTURES Bilateral 01/31/2020    Procedure: SALPINGECTOMY, LAPAROSCOPIC BILATERAL;  Surgeon: Mirlande Fisher MD;  [facility];  Service: Gynecology    NM NEUROPLASTY &/TRANSPOS MEDIAN NRV CARPAL TUNNE Left 3/8/2024    Procedure: RELEASE CARPAL TUNNEL;  Surgeon: Annemarie العراقي MD;  Location: [facility];  Service: Orthopedics    NM NEUROPLASTY &/TRANSPOSITION ULNAR NERVE ELBOW Left 3/8/2024    Procedure: RELEASE CUBITAL TUNNEL with transposition and adipose flap;  Surgeon: Annemarie العراقي MD;  [facility];  Service: Orthopedics    NM REMOVAL INTRAUTERINE DEVICE IUD N/A 01/31/2020    Procedure: REMOVAL OF INTRAUTERINE DEVICE (IUD);  Surgeon: Mirlande Fisher MD;  [facility];  Service: Gynecology    WISDOM TOOTH EXTRACTION         Immunization History   Administered  Date(s) Administered    COVID-19 J&J ([employer]) vaccine 0.5 mL 04/10/2021    COVID-19 MODERNA VACC 0.5 ML IM 01/14/2022    Influenza, injectable, quadrivalent, preservative free 0.5 mL 10/29/2019       Family History   Problem Relation Age of Onset    Thyroid disease Mother     Thyroid disease Sister     Thyroid disease Sister     Heart disease Maternal Grandmother         cardiac disorder    Thyroid disease Maternal Grandmother     No Known Problems Paternal Grandmother     No Known Problems Maternal Aunt     Breast cancer Neg Hx     Colon cancer Neg Hx     Ovarian cancer Neg Hx     Cervical cancer Neg Hx     Uterine cancer Neg Hx      Social History     Tobacco Use    Smoking status: Every Day     Current packs/day: 0.50     Average packs/day: 0.5 packs/day for 20.0 years (10.0 ttl pk-yrs)     Types: Cigarettes    Smokeless tobacco: Never   Vaping Use    Vaping status: Some Days    Substances: Nicotine, Flavoring   Substance Use Topics    Alcohol use: Yes     Comment: socially    Drug use: No       Current Outpatient Medications:     5-Hydroxytryptophan (5-HTP) 100 MG CAPS, , Disp: , Rfl:     Ascorbic Acid (VITAMIN C PO), Take by mouth, Disp: , Rfl:     Cholecalciferol (VITAMIN D3) 1000 units CAPS, Daily, Disp: , Rfl:     Cyanocobalamin (VITAMIN B-12 PO), Take by mouth, Disp: , Rfl:     GLUCOSAMINE-CHONDROITIN-MSM-D PO, , Disp: , Rfl:     MAGNESIUM PO, Take by mouth, Disp: , Rfl:     Misc Natural Products (Cortisol Manager) TABS, Take 1 tablet by mouth every other day, Disp: , Rfl:     Multiple Vitamin (MULTIVITAMIN) tablet, Take 1 tablet by mouth daily, Disp: , Rfl:     Multiple Vitamins-Minerals (AIRBORNE PO), Take by mouth, Disp: , Rfl:     Progesterone 100 MG CAPS, TAKE 100 MG BY MOUTH AT BEDTIME, Disp: 90 capsule, Rfl: 4    Turmeric (QC Tumeric Complex) 500 MG CAPS, , Disp: , Rfl:   Patient Active Problem List    Diagnosis Date Noted    Cubital tunnel syndrome of both upper extremities 03/07/2024    Nancie  "cyst 2021    Carpal tunnel syndrome of left wrist     Tobacco dependence 2018    Atypical chest pain 10/04/2017    Esophageal reflux 2014    Migraine headache 2012       Allergies   Allergen Reactions    Penicillins Other (See Comments)     Childhood allergy       OB History    Para Term  AB Living   3 1 0 1 2 1   SAB IAB Ectopic Multiple Live Births           1      # Outcome Date GA Lbr Albino/2nd Weight Sex Delivery Anes PTL Lv   3   36w0d   M Vag-Spont   KIM   2 AB            1 AB                Vitals:    03/15/24 1358   BP: 120/80   BP Location: Right arm   Patient Position: Sitting   Cuff Size: Standard   Weight: 58 kg (127 lb 12.8 oz)   Height: 5' 3.5\" (1.613 m)     Body mass index is 22.28 kg/m².    Review of Systems     Constitutional: Negative for chills, fatigue, fever, headaches, visual disturbances, and unexpected weight change.   Respiratory: Negative for cough, & shortness of breath.  Cardiovascular: Negative for chest pain. .    Gastrointestinal: Negative for Abd pain, nausea & vomiting, constipation and diarrhea.   Genitourinary: Negative for difficulty urinating, dysuria, hematuria,  unusual vaginal bleeding or discharge  Skin: Negative skin changes    Physical Exam     Constitutional: Alert & Oriented x3, well-developed and well-nourished. No distress.   HENT: Atraumatic, Normocephalic, Conjunctivae clear  Neck: Normal range of motion. Neck supple. No thyromegaly, mass, nodules or tenderness  Pulmonary: Effort normal.   Abdominal: Soft. No tenderness or masses  Musculoskeletal: Normal ROM  Skin: Warm & Dry  Psychological: Normal mood, thought content, behavior & judgement     Breasts: Bilateral breast augmentation  Right: tissue soft without masses, tenderness, skin changes or nipple discharge. No areas of erythema or pain. No subclavicular, axillary, pectoral adenopathy  Left:  tissue soft without masses, tenderness, skin changes or nipple discharge. " No areas of erythema or pain. No subclavicular, axillary, pectoral adenopathy    Pelvic exam was performed with patient supine, lithotomy position.      Labia: Negative rash, tenderness, lesion or injury on the right labia.              Negative rash, tenderness, lesion or injury on the left labia.   Urethral meatus:  Negative for  tenderness, inflammation or discharge.   Uterus: not deviated, enlarged, fixed or tender.   Cervix: No CMT, no discharge or friability.   Right adnexa: no mass, no tenderness and no fullness.  Left adnexa: no mass, no tenderness and no fullness.   Vagina: No erythema, tenderness, masses, or foreign body in the vagina. No signs of injury around the vagina. No unusual vaginal discharge   Perineum without lesions, signs of injury, erythema or swelling.  Inguinal Canal:        Right: No inguinal adenopathy or hernia present.        Left: No inguinal adenopathy or hernia present.     OBGyn Exam

## 2024-03-15 NOTE — PATIENT INSTRUCTIONS
Breast Self Exam for Women   AMBULATORY CARE:   A breast self-exam (BSE)  is a way to check your breasts for lumps and other changes. Regular BSEs can help you know how your breasts normally look and feel. Most breast lumps or changes are not cancer, but you should always have them checked by a healthcare provider.  Why you should do a BSE:  Breast cancer is the most common type of cancer in women. Even if you have mammograms, you may still want to do a BSE regularly. If you know how your breasts normally feel and look, it may help you know when to contact your healthcare provider. Mammograms can miss some cancers. You may find a lump during a BSE that did not show up on a mammogram.  When you should do a BSE:  If you have periods, you may want to do your BSE 1 week after your period ends. This is the time when your breasts may be the least swollen, lumpy, or tender. You can do regular BSEs even if you are breastfeeding or have breast implants.  Call your doctor if:   You find any lumps or changes in your breasts.    You have breast pain or fluid coming from your nipples.    You have questions or concerns about your condition or care.    How to do a BSE:       Look at your breasts in a mirror.  Look at the size and shape of each breast and nipple. Check for swelling, lumps, dimpling, scaly skin, or other skin changes. Look for nipple changes, such as a nipple that is painful or beginning to pull inward. Gently squeeze both nipples and check to see if fluid (that is not breast milk) comes out of them. If you find any of these or other breast changes, contact your healthcare provider. Check your breasts while you sit or  the following 3 positions:    Hang your arms down at your sides.    Raise your hands and join them behind your head.    Put firm pressure with your hands on your hips. Bend slightly forward while you look at your breasts in the mirror.    Lie down and feel your breasts.  When you lie down,  your breast tissue spreads out evenly over your chest. This makes it easier for you to feel for lumps and anything that may not be normal for your breasts. Do a BSE on one breast at a time.    Place a small pillow or towel under your left shoulder.  Put your left arm behind your head.    Use the 3 middle fingers of your right hand.  Use your fingertip pads, on the top of your fingers. Your fingertip pad is the most sensitive part of your finger.    Use small circles to feel your breast tissue.  Use your fingertip pads to make dime-sized, overlapping circles on your breast and armpits. Use light, medium, and firm pressure. First, press lightly. Second, press with medium pressure to feel a little deeper into the breast. Last, use firm pressure to feel deep within your breast.    Examine your entire breast area.  Examine the breast area from above the breast to below the breast where you feel only ribs. Make small circles with your fingertips, starting in the middle of your armpit. Make circles going up and down the breast area. Continue toward your breast and all the way across it. Examine the area from your armpit all the way over to the middle of your chest (breastbone). Stop at the middle of your chest.    Move the pillow or towel to your right shoulder, and put your right arm behind your head.  Use the 3 fingertip pads of your left hand, and repeat the above steps to do a BSE on your right breast.  What else you can do to check for breast problems or cancer:  Talk to your healthcare provider about mammograms. A mammogram is an x-ray of your breasts to screen for breast cancer or other problems. Your provider can tell you the benefits and risks of mammograms. The first mammogram is usually at age 45 or 50. Your provider may recommend you start at 40 or younger if your risk for breast cancer is high. Mammograms usually continue every 1 to 2 years until age 74.       Follow up with your doctor as directed:  Write  down your questions so you remember to ask them during your visits.  © Copyright [employer] 2023 Information is for End User's use only and may not be sold, redistributed or otherwise used for commercial purposes.  The above information is an  only. It is not intended as medical advice for individual conditions or treatments. Talk to your doctor, nurse or pharmacist before following any medical regimen to see if it is safe and effective for you.  Wellness Visit for Adults   AMBULATORY CARE:   A wellness visit  is when you see your healthcare provider to get screened for health problems. Your healthcare provider will also give you advice on how to stay healthy. Write down your questions so you remember to ask them. Ask your healthcare provider how often you should have a wellness visit.  What happens at a wellness visit:  Your healthcare provider will ask about your health, and your family history of health problems. This includes high blood pressure, heart disease, and cancer. He or she will ask if you have symptoms that concern you, if you smoke, and about your mood. You may also be asked about your intake of medicines, supplements, food, and alcohol. Any of the following may be done:  Your weight  will be checked. Your height may also be checked so your body mass index (BMI) can be calculated. Your BMI shows if you are at a healthy weight.    Your blood pressure  and heart rate will be checked. Your temperature may also be checked.    Blood and urine tests  may be done. Blood tests may be done to check your cholesterol levels. Abnormal cholesterol levels increase your risk for heart disease and stroke. You may also need a blood or urine test to check for diabetes if you are at increased risk. Urine tests may be done to look for signs of an infection or kidney disease.    A physical exam  includes checking your heartbeat and lungs with a stethoscope. Your healthcare provider may also check your skin to  look for sun damage.    Screening tests  may be recommended. A screening test is done to check for diseases that may not cause symptoms. The screening tests you may need depend on your age, gender, family history, and lifestyle habits. For example, colorectal screening may be recommended if you are 50 years old or older.    Screening tests you need if you are a woman:   A Pap smear  is used to screen for cervical cancer. Pap smears are usually done every 3 to 5 years depending on your age. You may need them more often if you have had abnormal Pap smear test results in the past. Ask your healthcare provider how often you should have a Pap smear.    A mammogram  is an x-ray of your breasts to screen for breast cancer. Experts recommend mammograms every 2 years starting at age 50 years. You may need a mammogram at age 49 years or younger if you have an increased risk for breast cancer. Talk to your healthcare provider about when you should start having mammograms and how often you need them.    Vaccines you may need:   Get an influenza vaccine  every year. The influenza vaccine protects you from the flu. Several types of viruses cause the flu. The viruses change over time, so new vaccines are made each year.    Get a tetanus-diphtheria (Td) booster vaccine  every 10 years. This vaccine protects you against tetanus and diphtheria. Tetanus is a severe infection that may cause painful muscle spasms and lockjaw. Diphtheria is a severe bacterial infection that causes a thick covering in the back of your mouth and throat.    Get a human papillomavirus (HPV) vaccine  if you are female and aged 19 to 26 or male 19 to 21 and never received it. This vaccine protects you from HPV infection. HPV is the most common infection spread by sexual contact. HPV may also cause vaginal, penile, and anal cancers.    Get a pneumococcal vaccine  if you are aged 65 years or older. The pneumococcal vaccine is an injection given to protect you  from pneumococcal disease. Pneumococcal disease is an infection caused by pneumococcal bacteria. The infection may cause pneumonia, meningitis, or an ear infection.    Get a shingles vaccine  if you are 60 or older, even if you have had shingles before. The shingles vaccine is an injection to protect you from the varicella-zoster virus. This is the same virus that causes chickenpox. Shingles is a painful rash that develops in people who had chickenpox or have been exposed to the virus.    How to eat healthy:  My Plate is a model for planning healthy meals. It shows the types and amounts of foods that should go on your plate. Fruits and vegetables make up about half of your plate, and grains and protein make up the other half. A serving of dairy is included on the side of your plate. The amount of calories and serving sizes you need depends on your age, gender, weight, and height. Examples of healthy foods are listed below:  Eat a variety of vegetables  such as dark green, red, and orange vegetables. You can also include canned vegetables low in sodium (salt) and frozen vegetables without added butter or sauces.    Eat a variety of fresh fruits , canned fruit in 100% juice, frozen fruit, and dried fruit.    Include whole grains.  At least half of the grains you eat should be whole grains. Examples include whole-wheat bread, wheat pasta, brown rice, and whole-grain cereals such as oatmeal.    Eat a variety of protein foods such as seafood (fish and shellfish), lean meat, and poultry without skin (turkey and chicken). Examples of lean meats include pork leg, shoulder, or tenderloin, and beef round, sirloin, tenderloin, and extra lean ground beef. Other protein foods include eggs and egg substitutes, beans, peas, soy products, nuts, and seeds.    Choose low-fat dairy products such as skim or 1% milk or low-fat yogurt, cheese, and cottage cheese.    Limit unhealthy fats  such as butter, hard margarine, and shortening.        Exercise:  Exercise at least 30 minutes per day on most days of the week. Some examples of exercise include walking, biking, dancing, and swimming. You can also fit in more physical activity by taking the stairs instead of the elevator or parking farther away from stores. Include muscle strengthening activities 2 days each week. Regular exercise provides many health benefits. It helps you manage your weight, and decreases your risk for type 2 diabetes, heart disease, stroke, and high blood pressure. Exercise can also help improve your mood. Ask your healthcare provider about the best exercise plan for you.       General health and safety guidelines:   Do not smoke.  Nicotine and other chemicals in cigarettes and cigars can cause lung damage. Ask your healthcare provider for information if you currently smoke and need help to quit. E-cigarettes or smokeless tobacco still contain nicotine. Talk to your healthcare provider before you use these products.    Limit alcohol.  A drink of alcohol is 12 ounces of beer, 5 ounces of wine, or 1½ ounces of liquor.    Lose weight, if needed.  Being overweight increases your risk of certain health conditions. These include heart disease, high blood pressure, type 2 diabetes, and certain types of cancer.    Protect your skin.  Do not sunbathe or use tanning beds. Use sunscreen with a SPF 15 or higher. Apply sunscreen at least 15 minutes before you go outside. Reapply sunscreen every 2 hours. Wear protective clothing, hats, and sunglasses when you are outside.    Drive safely.  Always wear your seatbelt. Make sure everyone in your car wears a seatbelt. A seatbelt can save your life if you are in an accident. Do not use your cell phone when you are driving. This could distract you and cause an accident. Pull over if you need to make a call or send a text message.    Practice safe sex.  Use latex condoms if are sexually active and have more than one partner. Your healthcare  provider may recommend screening tests for sexually transmitted infections (STIs).    Wear helmets, lifejackets, and protective gear.  Always wear a helmet when you ride a bike or motorcycle, go skiing, or play sports that could cause a head injury. Wear protective equipment when you play sports. Wear a lifejacket when you are on a boat or doing water sports.    © Copyright [employer] 2023 Information is for End User's use only and may not be sold, redistributed or otherwise used for commercial purposes.  The above information is an  only. It is not intended as medical advice for individual conditions or treatments. Talk to your doctor, nurse or pharmacist before following any medical regimen to see if it is safe and effective for you.  Kegel Exercises for Women   AMBULATORY CARE:   Kegel exercises  help strengthen your pelvic muscles. Pelvic muscles hold your pelvic organs, such as your bladder and uterus, in place. Kegel exercises help prevent or control certain conditions, such as urine incontinence (leakage) or uterine prolapse.       Call your doctor or physical therapist if:   You cannot feel your muscles tighten or relax.    You continue to leak urine.    You have questions or concerns about your condition or care.    Use the correct muscles:  Pelvic muscles are the muscles you use to control urine flow. To target these muscles, stop and start the flow of urine several times. This will help you become familiar with how it feels to tighten and relax these muscles.  How to do Kegel exercises:   Get into a comfortable position.  You may lie down, stand up, or sit down to do these exercises. When you first try to do these exercises, it may be easier if you lie down.    Tighten or squeeze your pelvic muscles slowly.  It may feel like you are trying to hold back urine or gas. Hold this position for 3 seconds. Relax for 3 seconds. Repeat this cycle 10 times. Do not hold your breath when you do Kegel  exercises. Keep your stomach, back, and leg muscles relaxed.    Do 10 sets of Kegel exercises, at least 3 times a day.  When you know how to do Kegel exercises, use different positions. This will help to strengthen your pelvic muscles as much as possible. You can do these exercises while you lie on the floor, watch TV, or while you stand. Tighten your pelvic muscles before you sneeze, cough, or lift to prevent urine leakage. You may notice improved bladder control within about 6 weeks.    Follow up with your doctor or physical therapist as directed:  Write down your questions so you remember to ask them during your visits.  © Copyright [employer] 2023 Information is for End User's use only and may not be sold, redistributed or otherwise used for commercial purposes.  The above information is an  only. It is not intended as medical advice for individual conditions or treatments. Talk to your doctor, nurse or pharmacist before following any medical regimen to see if it is safe and effective for you.       Perineal Hygiene      Your vaginal naturally takes care of its self, it is a self washing system, the less you mess the healthier it will be     No soaps or feminine wash to the vulva, these products can cause dermitis, bacterial infections and other vulvar problems.   Use only water to cleanse, or water with Dove or Dove Sensitive Skin Bar soap if necessary.    No scented lotions or products are advised in or near your vulva.    Use only coconut oil for moisture if needed.  No douching this may cause imbalance in your vaginal PH and further issues.    If you wear panty liners, you may apply a thin coating of Vaseline, A&D ointment or coconut oil to the vulvar tissues as a skin barrier     Cotton underware, loose fitting clothing  Only perfume-free, dye-free laundry detergent, use a second rinse cycle   Avoid fabric softeners/dryer sheets.       Your partner should avoid the same products as well.        Over the counter probiotic to restore vaginal ingrid may be helpful as well, take daily.       You may also look into Boric Acid vaginal suppositories to restore vaginal PH balance for up to 2 weeks as directed on the box. You may not use these if you are pregnant      For vaginal dryness:      You may use:     Coconut oil (organic, pure, unscented) as needed for moisture or lubrication. ( Do not use if allergic)       Replens moisture restore external comfort gel daily ( use as directed on the box)        Replens long lasting vaginal moisturizer  ( use as directed on the box)         For Vaginal Lubrication:          You may use:     Coconut oil (organic, pure, unscented) as a lubricant or another scent-free lubricant (Astroglide, Uberlube) if needed.  Do not use coconut oil or silicone if using a condom as this may break down the integrity of the condom and cause an unplanned pregnancy              Do not use coconut oil if allergic               Replens silky smooth lubricant, premium silicone based lubricant for intercourse. ( use as directed, a small amount will provide an enhanced natural feeling)     Any premium over the counter vaginal lubricant water or silicone based. Silicone based will have more staying power.     Menopause   WHAT YOU NEED TO KNOW:   What is menopause?  Menopause is a normal stage in a person's life when monthly periods stop. You are considered to be in menopause when you have not had a period for a full year after the age of 40. Menopause usually occurs between ages 47 to 53. Perimenopause is a stage before menopause that may cause signs and symptoms similar to menopause. Perimenopause may start about 4 years before menopause.       What causes menopause?  Menopause starts when the ovaries stop making the female hormones estrogen and progesterone. After menopause, you are no longer able to become pregnant. Any of the following may trigger menopause or early menopause:  Surgery, including  a hysterectomy or oophorectomy    Family history of early menopause    Smoking    Chemotherapy or pelvic radiation    Chromosome abnormalities, including Champion syndrome and Fragile X syndrome    Premature ovarian insufficiency (the ovaries stop producing eggs before age 40)    What are the signs and symptoms of menopause?   Irregular menstrual cycles with heavy vaginal bleeding followed by decreased bleeding until it stops    Hot flashes (feeling warm, flushed, and sweaty)    Vaginal changes such as increased dryness    Mood changes such as anxiety, depression, or decreased desire to have sex    Trouble sleeping, joint pain, headaches    Brittle nails, hair on chin or chest where it is normally absent    Decrease in breast size and change in skin texture    Weight gain    How is menopause treated or managed?   Hormone replacement therapy (HRT) is medicine that replaces your low hormone levels.  HRT contains estrogen and sometimes progestin.    HRT has several benefits.  HRT helps prevent osteoporosis, which decreases your risk for bone fractures. HRT also protects you from colorectal cancer.    HRT also has some risks.  HRT increases your risk for breast cancer, blood clots, heart disease, a heart attack, or a stroke. If you are 65 years or older, HRT can also increase your risk for dementia. Your risk for uterine or endometrial cancer, gallbladder disease, and urinary incontinence is higher if you take estrogen-only HRT.    Manage hot flashes.  Hot flashes are brief periods of feeling very warm, flushed, and sweaty. Hot flashes can last from a few seconds to several minutes. They may happen many times during the day, and are common at night. Layer your clothing so that you can easily remove some clothing and cool yourself during a hot flash. Cold drinks may also be helpful. Non-hormone medicines can help relieve or prevent hot flashes. Examples include certain antidepressants, nerve medicines, and high blood  pressure medicines.    Reduce vaginal dryness by using over-the-counter vaginal creams.  Vaginal dryness may cause you to have pain or discomfort during sex. Only use creams that are made for vaginal use. Do  not  use petroleum jelly. You may put an estrogen cream in and around your vagina. Estrogen cream may help decrease vaginal dryness and lower your risk of vaginal infections.    Continue to use birth control during perimenopause if you do not want to get pregnant.  You may need to use birth control until it has been 1 year since your periods stopped. Ask your healthcare provider when you can stop using birth control to prevent pregnancy.    How can I live a healthy lifestyle during and after menopause?  After menopause, your risk for heart disease and bone loss increases. Ask about these and other ways to stay healthy:  Exercise regularly.  Exercise helps you maintain a healthy weight. Exercise can also help to control your blood pressure and cholesterol levels. Include weight-bearing exercise for strong bones. Weight bearing exercise is recommended for at least 30 minutes, 3 times a week. Ask your healthcare provider about the best exercise plan for you.         Eat a variety of healthy foods.  Include fruits, vegetables, whole grains (whole-wheat bread, pasta, and cereals), low-fat dairy, and lean protein foods (beans, poultry, and fish). Limit foods high in sodium (salt). Ask your healthcare provider for more information about a meal plan that is right for you.         Do not smoke.  If you smoke, it is never too late to quit. You are more likely to have a heart attack, lung disease, blood clots, and cancer if you smoke. Ask your healthcare provider for information if you need help quitting.    Take supplements as directed.  You may need extra calcium and vitamin D to help prevent osteoporosis.            Limit alcohol and caffeine.  Alcohol and caffeine may worsen your symptoms.    When should I call my  doctor?   You have vaginal bleeding after menopause.    You have questions or concerns about your condition or care.    CARE AGREEMENT:   You have the right to help plan your care. Learn about your health condition and how it may be treated. Discuss treatment options with your healthcare providers to decide what care you want to receive. You always have the right to refuse treatment. The above information is an  only. It is not intended as medical advice for individual conditions or treatments. Talk to your doctor, nurse or pharmacist before following any medical regimen to see if it is safe and effective for you.  © Copyright [employer] 2023 Information is for End User's use only and may not be sold, redistributed or otherwise used for commercial purposes.

## 2024-03-18 ENCOUNTER — OFFICE VISIT (OUTPATIENT)
Dept: OBGYN CLINIC | Facility: HOSPITAL | Age: 49
End: 2024-03-18

## 2024-03-18 VITALS — BODY MASS INDEX: 22.15 KG/M2 | WEIGHT: 125 LBS | HEIGHT: 63 IN

## 2024-03-18 DIAGNOSIS — G56.02 CARPAL TUNNEL SYNDROME OF LEFT WRIST: Primary | ICD-10-CM

## 2024-03-18 DIAGNOSIS — G56.23 CUBITAL TUNNEL SYNDROME OF BOTH UPPER EXTREMITIES: ICD-10-CM

## 2024-03-18 PROCEDURE — 99024 POSTOP FOLLOW-UP VISIT: CPT | Performed by: SURGERY

## 2024-03-18 NOTE — PROGRESS NOTES
Assessment/Plan:  Patient ID: Karine Downs 48 y.o. female   Surgery: RELEASE CUBITAL TUNNEL with transposition and adipose flap - Left and Release Carpal Tunnel - Left  Date of Surgery: 3/8/2024    Resume activities as tolerated, activity modification, and scar tissue massage    Follow Up:  PRN          CHIEF COMPLAINT:  Chief Complaint   Patient presents with    Post-op     Had surgery 10 days ago. Everything going well. Stiches still in.          SUBJECTIVE:  Karine Downs is a 48 y.o. year old female who presents for follow up after RELEASE CUBITAL TUNNEL with transposition and adipose flap - Left and Release Carpal Tunnel - Left. Today patient has stiffness/pain in the elbow, hand soreness with writing, paresthesias improving .       PHYSICAL EXAMINATION:  General: well developed and well nourished, alert, oriented times 3, and appears comfortable  Psychiatric: Normal    MUSCULOSKELETAL EXAMINATION:  Incision: Clean, dry, intact  Surgery Site: normal, no evidence of infection   Range of Motion: As expected  Neurovascular status: Neuro intact, good cap refill  Activity Restrictions: No restrictions  Done today: Sutures out        STUDIES REVIEWED:  No New imaging to review   LABS REVIEWED:    HgA1c:   Lab Results   Component Value Date    HGBA1C 5.1 04/19/2018     BMP:   Lab Results   Component Value Date    CALCIUM 10.1 09/14/2023    K 3.8 09/14/2023    CO2 31 09/14/2023     09/14/2023    BUN 14 09/14/2023    CREATININE 0.94 09/14/2023             Scribe Attestation      I,:   am acting as a scribe while in the presence of the attending physician.:       I,:   personally performed the services described in this documentation    as scribed in my presence.:

## 2024-03-21 DIAGNOSIS — M75.81 TENDINITIS OF BOTH ROTATOR CUFFS: Primary | ICD-10-CM

## 2024-03-21 DIAGNOSIS — M75.82 TENDINITIS OF BOTH ROTATOR CUFFS: Primary | ICD-10-CM

## 2024-04-09 DIAGNOSIS — N95.1 PERIMENOPAUSAL SYMPTOMS: ICD-10-CM

## 2024-04-10 NOTE — TELEPHONE ENCOUNTER
Called LM for patient. The RX is in the computer from January. The pharmacy contacted and said its backordered and unable to fill it. I told patient in message she can call around to other pharmacies to see if they have it in stock and we can try and transfer the prescription for her. Left office # to CB.

## 2024-04-18 DIAGNOSIS — N95.1 PERIMENOPAUSAL SYMPTOMS: ICD-10-CM

## 2024-04-18 RX ORDER — PROGESTERONE 100 MG/1
1 CAPSULE ORAL
Qty: 90 CAPSULE | Refills: 4 | Status: SHIPPED | OUTPATIENT
Start: 2024-04-18 | End: 2024-04-18 | Stop reason: SDUPTHER

## 2024-04-18 RX ORDER — PROGESTERONE 100 MG/1
1 CAPSULE ORAL
Qty: 90 CAPSULE | Refills: 4 | Status: SHIPPED | OUTPATIENT
Start: 2024-04-18

## 2024-04-22 ENCOUNTER — VBI (OUTPATIENT)
Dept: ADMINISTRATIVE | Facility: OTHER | Age: 49
End: 2024-04-22

## 2024-06-21 ENCOUNTER — OFFICE VISIT (OUTPATIENT)
Dept: FAMILY MEDICINE CLINIC | Facility: CLINIC | Age: 49
End: 2024-06-21
Payer: COMMERCIAL

## 2024-06-21 VITALS
DIASTOLIC BLOOD PRESSURE: 74 MMHG | OXYGEN SATURATION: 98 % | SYSTOLIC BLOOD PRESSURE: 114 MMHG | WEIGHT: 126.6 LBS | BODY MASS INDEX: 22.43 KG/M2 | HEIGHT: 63 IN | HEART RATE: 91 BPM | TEMPERATURE: 98.3 F

## 2024-06-21 DIAGNOSIS — R21 FACIAL RASH: Primary | ICD-10-CM

## 2024-06-21 DIAGNOSIS — Z12.11 SCREEN FOR COLON CANCER: ICD-10-CM

## 2024-06-21 PROCEDURE — 99213 OFFICE O/P EST LOW 20 MIN: CPT | Performed by: INTERNAL MEDICINE

## 2024-06-21 RX ORDER — METHYLPREDNISOLONE 4 MG/1
TABLET ORAL
Qty: 21 EACH | Refills: 0 | Status: SHIPPED | OUTPATIENT
Start: 2024-06-21

## 2024-06-21 NOTE — ASSESSMENT & PLAN NOTE
Several days ago the patient noticed an itchy rash of her face that was raised and somewhat pimple like. Yesterday her face was extremely swollen today it seems a little better but it still itches. She denies any new product or any other new exposures. She has try topical hydrocortisone. Add a Medrol Dosepak

## 2024-06-21 NOTE — PROGRESS NOTES
Ambulatory Visit  Name: Karine Downs      : 1975      MRN: [id number]  Encounter Provider: Nunu Lemos MD  Encounter Date: 2024   Encounter department: [facility]    Assessment & Plan   1. Facial rash  Assessment & Plan:  Several days ago the patient noticed an itchy rash of her face that was raised and somewhat pimple like. Yesterday her face was extremely swollen today it seems a little better but it still itches. She denies any new product or any other new exposures. She has try topical hydrocortisone. Add a Medrol Dosepak  Orders:  -     methylPREDNISolone 4 MG tablet therapy pack; Use as directed on package  2. Screen for colon cancer  -     Ambulatory Referral to Gastroenterology; Future       History of Present Illness     Rash  This is a new problem. The current episode started in the past 7 days. The problem has been gradually worsening since onset. The affected locations include the face. The problem is moderate. The rash is characterized by burning, redness, swelling and itchiness. She was exposed to nothing. The rash first occurred at home. Associated symptoms include facial edema. Pertinent negatives include no cough, fever, shortness of breath, sore throat or vomiting. Past treatments include cold compress and topical steroids. The treatment provided mild relief. There is no history of asthma or eczema. There were no sick contacts.       Review of Systems   Constitutional:  Negative for chills and fever.   HENT:  Negative for ear pain and sore throat.    Eyes:  Negative for pain and visual disturbance.   Respiratory:  Negative for cough and shortness of breath.    Cardiovascular:  Negative for chest pain and palpitations.   Gastrointestinal:  Negative for abdominal pain and vomiting.   Genitourinary:  Negative for dysuria and hematuria.   Musculoskeletal:  Negative for arthralgias and back pain.   Skin:  Positive for rash. Negative for color change.   Neurological:  Negative  "for seizures and syncope.   All other systems reviewed and are negative.      Objective     /74 (BP Location: Left arm, Patient Position: Sitting, Cuff Size: Standard)   Pulse 91   Temp 98.3 °F (36.8 °C) (Temporal)   Ht 5' 3\" (1.6 m)   Wt 57.4 kg (126 lb 9.6 oz)   SpO2 98%   BMI 22.43 kg/m²     Physical Exam  Vitals and nursing note reviewed.   Constitutional:       General: She is not in acute distress.     Appearance: She is well-developed.   HENT:      Head: Normocephalic and atraumatic.   Eyes:      Conjunctiva/sclera: Conjunctivae normal.   Cardiovascular:      Rate and Rhythm: Normal rate and regular rhythm.      Heart sounds: No murmur heard.  Pulmonary:      Effort: Pulmonary effort is normal. No respiratory distress.      Breath sounds: Normal breath sounds.   Abdominal:      Palpations: Abdomen is soft.      Tenderness: There is no abdominal tenderness.   Musculoskeletal:         General: No swelling.      Cervical back: Neck supple.   Skin:     General: Skin is warm and dry.      Capillary Refill: Capillary refill takes less than 2 seconds.      Findings: Rash (Faint bumpy red rash of face mild edema) present.   Neurological:      Mental Status: She is alert.   Psychiatric:         Mood and Affect: Mood normal.       Administrative Statements     "

## 2024-08-15 ENCOUNTER — OFFICE VISIT (OUTPATIENT)
Dept: OBGYN CLINIC | Facility: CLINIC | Age: 49
End: 2024-08-15
Payer: COMMERCIAL

## 2024-08-15 VITALS — HEIGHT: 64 IN | BODY MASS INDEX: 21 KG/M2 | WEIGHT: 123 LBS

## 2024-08-15 DIAGNOSIS — G56.01 CARPAL TUNNEL SYNDROME ON RIGHT: Primary | ICD-10-CM

## 2024-08-15 DIAGNOSIS — G56.21 CUBITAL TUNNEL SYNDROME ON RIGHT: ICD-10-CM

## 2024-08-15 PROCEDURE — 99214 OFFICE O/P EST MOD 30 MIN: CPT | Performed by: SURGERY

## 2024-08-15 RX ORDER — SODIUM CHLORIDE 9 MG/ML
75 INJECTION, SOLUTION INTRAVENOUS CONTINUOUS
OUTPATIENT
Start: 2024-08-15

## 2024-08-15 RX ORDER — CHLORHEXIDINE GLUCONATE ORAL RINSE 1.2 MG/ML
15 SOLUTION DENTAL ONCE
OUTPATIENT
Start: 2024-08-15 | End: 2024-08-15

## 2024-08-15 NOTE — PROGRESS NOTES
ASSESSMENT/PLAN:      48-year-old female here for her right carpal and cubital tunnel syndromes noted on ultrasound on 2/28/2024.    Patient wishes to proceed with surgical intervention for her right carpal and cubital tunnel syndromes.  Patient is doing well from her left side and wishes today as scheduled. The anatomy and physiology of carpal tunnel syndrome was discussed with the patient today.  Increase pressure localized under the transverse carpal ligament can cause pain, numbness, tingling, or dysesthesias within the median nerve distribution as well as feelings of fatigue, clumsiness, or awkwardness.  These symptoms typically occur at night and worse in the morning upon waking.  Eventually, untreated carpal tunnel syndrome can result in weakness and permanent loss of muscle within the thenar compartment of the hand.  Treatment options were discussed with the patient.  Conservative treatment includes nocturnal resting splints to keep the nerve in a neutral position, ergonomic changes within the work or home environment, activity modification, and tendon gliding exercises.  Steroid injections within the carpal canal can help a majority of patients, however this is often self-limited in a majority of patients.  Surgical intervention to divide the transverse carpal ligament typically results in a long-lasting relief of the patient's complaints, with the recurrence rate of less than 1%.   The anatomy and physiology of cubital tunnel syndrome were discussed with the patient today in the office.  Typically, increased elbow flexion activities decrease blood flow within the intraneural spaces, resulting in a feeling of numbness, tingling, weakness, or clumsiness within the hand and fingers.  Occasionally, anatomic structures such as medial elbow osteophytes, the medial head of the triceps, were subluxing ulnar nerve may result in increased pressure or aggravation at the cubital tunnel.  Typical signs and symptoms  usually include numbness and tingling within the ring and small finger, weakness with , and weakness with pinch.  Conservative treatment and includes nocturnal bracing to keep the elbow in a semi-extended position, activity modification, therapy, and avoiding excessive elbow flexion activities.  A majority of patients typically respond to conservative treatment over a period of approximately 3-6 months.  EMG/NCV testing of the ulnar nerve at the elbow is not as reliable as carpal tunnel syndrome.  Surgical intervention in the form of in situ release of the ulnar nerve at the elbow or ulnar nerve transposition may be required in up to 20% of patients.  Patient wishes to proceed with a right carpal and cubital tunnel releases same day at [place].  Patient's hand will be wrapped for 5 days postoperatively with no heavy lifting and needs to be covered for showering.  After 5 days postoperative dressings can come off and she can wash with warm water, soap and pat dry but still no submerging.  Sutures will come up between 10 and 14 days postoperatively.  Patient shows understanding and agrees with this plan of care.  Will follow-up with her postoperatively.        The patient verbalized understanding of exam findings and treatment plan. We engaged in the shared decision-making process and treatment options were discussed at length with the patient. Surgical and conservative management discussed today along with risks and benefits.    Diagnoses and all orders for this visit:    Carpal tunnel syndrome on right  -     Case request operating room: RELEASE CUBITAL TUNNEL, RELEASE CARPAL TUNNEL; Standing  -     Case request operating room: RELEASE CUBITAL TUNNEL, RELEASE CARPAL TUNNEL    Cubital tunnel syndrome on right  -     Case request operating room: RELEASE CUBITAL TUNNEL, RELEASE CARPAL TUNNEL; Standing  -     Case request operating room: RELEASE CUBITAL TUNNEL, RELEASE CARPAL TUNNEL    Other orders  -     Nursing  Communication Warmimg Interventions Implemented; Standing  -     Nursing Communication CHG bath, have staff wash entire body (neck down) per pre-op bathing protocol. Routine, evening prior to, and day of surgery.; Standing  -     Nursing Communication Swab both nares with Povidone-Iodine solution, EXCLUDE if patient has shellfish/Iodine allergy, and replace with nasal alcohol swabstick. Routine, day of surgery, on call to OR; Standing  -     chlorhexidine (PERIDEX) 0.12 % oral rinse 15 mL  -     Void on call to OR; Standing  -     Insert peripheral IV; Standing  -     Diet NPO; Sips with meds; Standing  -     sodium chloride 0.9 % infusion  -     ceFAZolin (ANCEF) 1,000 mg in sodium chloride 0.9 % 50 mL IVPB          Cubital Tunnel Release:   The anatomy and physiology of cubital tunnel syndrome were discussed with the patient today in the office.  Typically, increased elbow flexion activities decrease blood flow within the intraneural spaces, resulting in a feeling of numbness, tingling, weakness, or clumsiness within the hand and fingers.  Occasionally, anatomic structures such as medial elbow osteophytes, the medial head of the triceps, were subluxing ulnar nerve may result in increased pressure or aggravation at the cubital tunnel.  Typical signs and symptoms usually include numbness and tingling within the ring and small finger, weakness with , and weakness with pinch.  Conservative treatment and includes nocturnal bracing to keep the elbow in a semi-extended position, activity modification, therapy, and avoiding excessive elbow flexion activities.  A majority of patients typically respond to conservative treatment over a period of approximately 3-6 months.  Vitamin B6 one tablet daily over the counter may helpful to reduce symptoms.  EMG/NCV testing of the ulnar nerve at the elbow is not as reliable as carpal tunnel syndrome.  Surgical intervention in the form of in situ release of the ulnar nerve at the  elbow or ulnar nerve transposition may be required in up to 20% of patients. The patient has elected to undergo cubital tunnel release.  The possibility of converting to a subcutaneous or submuscular ulnar nerve transposition depending on the nerve stability was discussed with the patient.  Typically, in the postoperative period, light activities are allowed immediately, driving is allowed when narcotic medications have stopped, and the incision may get wet after 5 days.  Heavy activities will be allowed after follow up appointment in 1-2 weeks.  While the pain within the ring and small finger of the hands generally improves rapidly, the numbness and tingling, as well as the strength, will slowly improve over a period of weeks to months.  Total recovery can take up to 18 months from the time of surgery.  Numbness and tingling near the incision, or near the medial aspect of the forearm was discussed with the patient.  The patient has an understanding of the above mentioned discussion. The risks and benefits of the procedure were explained to the patient, which include, but are not limited to: Bleeding, infection, recurrence, pain, scar, damage to tendons, damage to nerves, and damage to blood vessels, failure to give desired results and complications related to anesthesia.  These risks, along with alternative conservative treatment options, and postoperative protocols were voiced back and understood by the patient.  All questions were answered to the patient's satisfaction.  The patient agrees to comply with a standard postoperative protocol, and is willing to proceed.  Education was provided via written and auditory forms.  There were no barriers to learning. Written handouts regarding wound care, incision and scar care, and general preoperative information was provided to the patient.  Prior to surgery, the patient may be requested to stop all anti-inflammatory medications.  Prophylactic aspirin, Plavix, and  Coumadin may be allowed to be continued.  Medications including vitamin E., ginkgo, and fish oil are requested to be stopped approximately one week prior to surgery.  Hypertensive medications and beta blockers, if taken, should be continued. Vitamin B6 one tablet daily over the counter may helpful to reduce symptoms.   and Open Carpal Tunnel Release:   The anatomy and physiology of carpal tunnel syndrome was discussed with the patient today.  Increase pressure localized under the transverse carpal ligament can cause pain, numbness, tingling, or dysesthesias within the median nerve distribution as well as feelings of fatigue, clumsiness, or awkwardness.  These symptoms typically occur at night and worse in the morning upon waking.  Eventually, untreated carpal tunnel syndrome can result in weakness and permanent loss of muscle within the thenar compartment of the hand.  Treatment options were discussed with the patient.  Conservative treatment includes nocturnal resting splints to keep the nerve in a neutral position, ergonomic changes within the work or home environment, activity modification, and tendon gliding exercises.  Vitamin B6 one tablet daily over the counter may helpful to reduce symptoms.  Steroid injections within the carpal canal can help a majority of patients, however this is often self-limited in a majority of patients.  Surgical intervention to divide the transverse carpal ligament typically results in a long-lasting relief of the patient's complaints, with the recurrence rate of less than 1%. The patient has elected to undergo an open carpal tunnel release.  The palmar incision technique was discussed in the office with the patient today.   In the postoperative period, light activities are allowed immediately, driving is allowed when narcotic medication has stopped, and the bandages may be removed and incision may get wet after 2 days.  Heavy activities (lifting more than approximately 10 pounds)  will be allowed after follow up appointment in 1-2 weeks.  While night symptoms (waking from sleep, pain, and discomfort in the hands) generally improves rapidly, the numbness and tingling as well as the strength will slowly improve over weeks to months depending on the chronicity and severity of the carpal tunnel syndrome.  Pillar pain and scar discomfort were discussed with the patient which are self-limiting conditions.  The risks of bleeding and infection from the surgery are less than 1%.  Risk of recurrence is approximately 0.5%.  The risks of nerve injury or nerve damage or damage to the blood vessels is approximately 1 in 1200. The patient has an understanding of the above mentioned discussion.The risks and benefits of the procedure were explained to the patient, which include, but are not limited to: Bleeding, infection, recurrence, pain, scar, damage to tendons, damage to nerves, and damage to blood vessels, failure to give desired results and complications related to anesthesia.  These risks, along with alternative conservative treatment options, and postoperative protocols were voiced back and understood by the patient.  All questions were answered to the patient's satisfaction.  The patient agrees to comply with a standard postoperative protocol, and is willing to proceed.  Education was provided via written and auditory forms.  There were no barriers to learning. Written handouts regarding wound care, incision and scar care, and general preoperative information was provided to the patient.  Prior to surgery, the patient may be requested to stop all anti-inflammatory medications.  Prophylactic aspirin, Plavix, and Coumadin may be allowed to be continued.  Medications including vitamin E., ginkgo, and fish oil are requested to be stopped approximately one week prior to surgery.  Hypertensive medications and beta blockers, if taken, s    Follow Up:  Return for Follow up post- op, After Surgery.      To Do  Next Visit:  Re-evaluation of current issue    ____________________________________________________________________________________________________________________________________________      CHIEF COMPLAINT:  Chief Complaint   Patient presents with    Follow-up     Sign up for carpal and cubital       SUBJECTIVE:  Karine Downs is a 48 y.o. year old LHD female who presents today for follow-up for her known right carpal and cubital tunnel syndromes noted on ultrasound.  Patient is 5 months status post left carpal and cubital tunnel releases and is doing well.  Her symptoms are completely resolved.  Patient has been wearing her cock-up wrist brace at night for the right side which has kept her symptoms at bay.       I have personally reviewed all the relevant PMH, PSH, SH, FH, Medications and allergies.     PAST MEDICAL HISTORY:  Past Medical History:   Diagnosis Date    Allergic     Penicillin    Internal derangement of knee joint, left     resolved 10/04/2017    Lyme disease     PONV (postoperative nausea and vomiting)     Varicella        PAST SURGICAL HISTORY:  Past Surgical History:   Procedure Laterality Date    CARPAL TUNNEL RELEASE      DE HYSTEROSCOPY BX ENDOMETRIUM&/POLYPC W/WO D&C N/A 01/31/2020    Procedure: DILATATION AND CURETTAGE (D&C) WITH HYSTEROSCOPY;  Surgeon: Mirlande Fisher MD;  Location: [facility];  Service: Gynecology    DE HYSTEROSCOPY ENDOMETRIAL ABLATION N/A 01/31/2020    Procedure: ABLATION ENDOMETRIAL NOVASURE;  Surgeon: Mirlande Fisher MD;  Location: [facility];  Service: Gynecology    DE LAPAROSCOPY W/RMVL ADNEXAL STRUCTURES Bilateral 01/31/2020    Procedure: SALPINGECTOMY, LAPAROSCOPIC BILATERAL;  Surgeon: Mirlande Fisher MD;  Location: [facility];  Service: Gynecology    DE NEUROPLASTY &/TRANSPOS MEDIAN NRV CARPAL TUNNE Left 03/08/2024    Procedure: RELEASE CARPAL TUNNEL;  Surgeon: Annemarie العراقي MD;  Location: [facility];  Service: Orthopedics    DE NEUROPLASTY  &/TRANSPOSITION ULNAR NERVE ELBOW Left 03/08/2024    Procedure: RELEASE CUBITAL TUNNEL with transposition and adipose flap;  Surgeon: Annemarie العراقي MD;  Location: [facility];  Service: Orthopedics    PA REMOVAL INTRAUTERINE DEVICE IUD N/A 01/31/2020    Procedure: REMOVAL OF INTRAUTERINE DEVICE (IUD);  Surgeon: Mirlande Fisher MD;  Location: [facility];  Service: Gynecology    WISDOM TOOTH EXTRACTION         FAMILY HISTORY:  Family History   Problem Relation Age of Onset    Thyroid disease Mother     Thyroid disease Sister     Thyroid disease Sister     Heart disease Maternal Grandmother         cardiac disorder    Thyroid disease Maternal Grandmother     Heart disease Maternal Grandfather     No Known Problems Paternal Grandmother     No Known Problems Maternal Aunt     Breast cancer Neg Hx     Colon cancer Neg Hx     Ovarian cancer Neg Hx     Cervical cancer Neg Hx     Uterine cancer Neg Hx        SOCIAL HISTORY:  Social History     Tobacco Use    Smoking status: Every Day     Current packs/day: 0.50     Average packs/day: 0.7 packs/day for 30.0 years (20.0 ttl pk-yrs)     Types: Cigarettes    Smokeless tobacco: Never   Vaping Use    Vaping status: Former    Substances: Nicotine, Flavoring   Substance Use Topics    Alcohol use: Yes     Comment: socially    Drug use: No       MEDICATIONS:    Current Outpatient Medications:     5-Hydroxytryptophan (5-HTP) 100 MG CAPS, , Disp: , Rfl:     Ascorbic Acid (VITAMIN C PO), Take by mouth, Disp: , Rfl:     Cholecalciferol (VITAMIN D3) 1000 units CAPS, Daily, Disp: , Rfl:     Cyanocobalamin (VITAMIN B-12 PO), Take by mouth, Disp: , Rfl:     MAGNESIUM PO, Take by mouth, Disp: , Rfl:     Multiple Vitamin (MULTIVITAMIN) tablet, Take 1 tablet by mouth daily, Disp: , Rfl:     Progesterone 100 MG CAPS, Take 1 capsule by mouth daily at bedtime, Disp: 90 capsule, Rfl: 4    Turmeric (QC Tumeric Complex) 500 MG CAPS, , Disp: , Rfl:     GLUCOSAMINE-CHONDROITIN-MSM-D PO, ,  Disp: , Rfl:     methylPREDNISolone 4 MG tablet therapy pack, Use as directed on package, Disp: 21 each, Rfl: 0    Misc Natural Products (Cortisol Manager) TABS, Take 1 tablet by mouth every other day (Patient not taking: Reported on 6/21/2024), Disp: , Rfl:     Multiple Vitamins-Minerals (AIRBORNE PO), Take by mouth (Patient not taking: Reported on 6/21/2024), Disp: , Rfl:     ALLERGIES:  Allergies   Allergen Reactions    Penicillins Other (See Comments)     Childhood allergy       REVIEW OF SYSTEMS:  Review of Systems   Constitutional:  Negative for chills, fever and unexpected weight change.   HENT:  Negative for hearing loss, nosebleeds and sore throat.    Eyes:  Negative for pain, redness and visual disturbance.   Respiratory:  Negative for cough, shortness of breath and wheezing.    Cardiovascular:  Negative for chest pain, palpitations and leg swelling.   Gastrointestinal:  Negative for abdominal pain and nausea.   Genitourinary:  Negative for dyspareunia, dysuria and frequency.   Musculoskeletal:  Negative for arthralgias and joint swelling.   Skin:  Negative for rash and wound.   Neurological:  Positive for numbness. Negative for dizziness and headaches.   Psychiatric/Behavioral:  Negative for decreased concentration and suicidal ideas. The patient is not nervous/anxious.        VITALS:  There were no vitals filed for this visit.      _____________________________________________________  PHYSICAL EXAMINATION:  General: well developed and well nourished, alert, oriented times 3, and appears comfortable  Psychiatric: Normal  HEENT: Normocephalic, Atraumatic Trachea Midline, No torticollis  Pulmonary: No audible wheezing or respiratory distress   Cardiovascular: No pitting edema, 2+ radial pulse   Abdominal/GI: abdomen non tender, non distended   Skin: No masses, erythema, lacerations, fluctation, ulcerations  Neurovascular: Sensation Intact to the Radial Nerve, Decreased Sensation to  the Median Nerve,  Decreased Sensation to  the Ulnar Nerve, Motor Intact to the Median, Ulnar, Radial Nerve, and Pulses Intact  Musculoskeletal: Normal, except as noted in detailed exam and in HPI.      MUSCULOSKELETAL EXAMINATION:    Right Carpal Tunnel Exam:    Negative thenar atrophy. Negative phalen's test. Positive carpal tunnel compression. Positive tinels over median nerve at the wrist.  Opposition strength 5/5.  Abduction strength 5/5.      Right Ulnar Nerve Exam:    Negative intrinsic atrophy.  Negative  deformity at the elbow.   Full range of motion with flexion and extension of the elbow.   Negative ulnar nerve compression test at the elbow. Positive tinels over the ulnar nerve at the elbow.  Negative cross finger test in the index and long fingers.   Intrinsic strength 5/5 .    ___________________________________________________    STUDIES REVIEWED:    I have personally reviewed and interpreted US of Bilateral elbows reviewed from 2/28/2024 which demonstrate IMPRESSION:     RIGHT CUBITAL TUNNEL: Enlarged ulnar nerve near/within cubital tunnel suspicious for ulnar neuropathy/cubital tunnel syndrome.     LEFT CUBITAL TUNNEL: Enlarged ulnar nerve near/within cubital tunnel, suggestive of ulnar neuropathy/cubital tunnel syndrome. There is also evidence of ulnar nerve dislocation from the cubital tunnel on dynamic flexion-extension evaluation.     US of the Bilateral wrists:  IMPRESSION:     RIGHT SIDE: Carpal tunnel syndrome ruled in based on marked abnormal CSA > 14 sq mm.     LEFT SIDE: Carpal tunnel syndrome ruled in based on marked abnormal CSA > 14 sq mm.    LABS REVIEWED:    HgA1c:   Lab Results   Component Value Date    HGBA1C 5.1 04/19/2018     BMP:   Lab Results   Component Value Date    CALCIUM 10.1 09/14/2023    K 3.8 09/14/2023    CO2 31 09/14/2023     09/14/2023    BUN 14 09/14/2023    CREATININE 0.94 09/14/2023             PROCEDURES PERFORMED:  Procedures  No Procedures performed  today    _____________________________________________________      Emy E Saez

## 2024-09-13 ENCOUNTER — ANESTHESIA EVENT (OUTPATIENT)
Age: 49
End: 2024-09-13
Payer: COMMERCIAL

## 2024-09-24 PROCEDURE — NC001 PR NO CHARGE: Performed by: SURGERY

## 2024-09-24 NOTE — PRE-PROCEDURE INSTRUCTIONS
Pre-Surgery Instructions:   Medication Instructions    5-Hydroxytryptophan (5-HTP) 100 MG CAPS Hold from now till after procedure       Ascorbic Acid (VITAMIN C PO) Hold from now till after procedure     Cholecalciferol (VITAMIN D3) 1000 units CAPS Hold from now till after procedure       Cyanocobalamin (VITAMIN B-12 PO) Hold from now till after procedure       MAGNESIUM PO Hold from now till after procedure       Multiple Vitamin (MULTIVITAMIN) tablet Hold from now till after procedure       Progesterone 100 MG CAPS Normally takes at night.      [DISCONTINUED] GLUCOSAMINE-CHONDROITIN-MSM-D PO     Medication instructions for day surgery reviewed. Please use only a sip of water to take your instructed medications. Avoid all over the counter vitamins, supplements and NSAIDS for one week prior to surgery per anesthesia guidelines. Tylenol is ok to take as needed.     You will receive a call one business day prior to surgery with an arrival time and hospital directions. If your surgery is scheduled on a Monday, the hospital will be calling you on the Friday prior to your surgery. If you have not heard from anyone by 8pm, please call the hospital supervisor through the hospital  at [phone]. ([place] [phone] or Fisher [phone]).    Do not eat or drink anything after midnight the night before your surgery, including candy, mints, lifesavers, or chewing gum. Do not drink alcohol 24hrs before your surgery. Try not to smoke at least 24hrs before your surgery.       Follow the pre surgery showering instructions as listed in the “My Surgical Experience Booklet” or otherwise provided by your surgeon's office. Do not use a blade to shave the surgical area 1 week before surgery. It is okay to use a clean electric clippers up to 24 hours before surgery. Do not apply any lotions, creams, including makeup, cologne, deodorant, or perfumes after showering on the day of your surgery. Do not use dry shampoo, hair  spray, hair gel, or any type of hair products.     No contact lenses, eye make-up, or artificial eyelashes. Remove nail polish, including gel polish, and any artificial, gel, or acrylic nails if possible. Remove all jewelry including rings and body piercing jewelry.     Wear causal clothing that is easy to take on and off. Consider your type of surgery.    Keep any valuables, jewelry, piercings at home. Please bring any specially ordered equipment (sling, braces) if indicated.    Arrange for a responsible person to drive you to and from the hospital on the day of your surgery. Please confirm the visitor policy for the day of your procedure when you receive your phone call with an arrival time.     Call the surgeon's office with any new illnesses, exposures, or additional questions prior to surgery.    Please reference your “My Surgical Experience Booklet” for additional information to prepare for your upcoming surgery.

## 2024-09-24 NOTE — H&P
ASSESSMENT/PLAN:       48-year-old female here for her right carpal and cubital tunnel syndromes noted on ultrasound on 2/28/2024.    Patient wishes to proceed with surgical intervention for her right carpal and cubital tunnel syndromes.  Patient is doing well from her left side and wishes today as scheduled. The anatomy and physiology of carpal tunnel syndrome was discussed with the patient today.  Increase pressure localized under the transverse carpal ligament can cause pain, numbness, tingling, or dysesthesias within the median nerve distribution as well as feelings of fatigue, clumsiness, or awkwardness.  These symptoms typically occur at night and worse in the morning upon waking.  Eventually, untreated carpal tunnel syndrome can result in weakness and permanent loss of muscle within the thenar compartment of the hand.  Treatment options were discussed with the patient.  Conservative treatment includes nocturnal resting splints to keep the nerve in a neutral position, ergonomic changes within the work or home environment, activity modification, and tendon gliding exercises.  Steroid injections within the carpal canal can help a majority of patients, however this is often self-limited in a majority of patients.  Surgical intervention to divide the transverse carpal ligament typically results in a long-lasting relief of the patient's complaints, with the recurrence rate of less than 1%.   The anatomy and physiology of cubital tunnel syndrome were discussed with the patient today in the office.  Typically, increased elbow flexion activities decrease blood flow within the intraneural spaces, resulting in a feeling of numbness, tingling, weakness, or clumsiness within the hand and fingers.  Occasionally, anatomic structures such as medial elbow osteophytes, the medial head of the triceps, were subluxing ulnar nerve may result in increased pressure or aggravation at the cubital tunnel.  Typical signs and symptoms  usually include numbness and tingling within the ring and small finger, weakness with , and weakness with pinch.  Conservative treatment and includes nocturnal bracing to keep the elbow in a semi-extended position, activity modification, therapy, and avoiding excessive elbow flexion activities.  A majority of patients typically respond to conservative treatment over a period of approximately 3-6 months.  EMG/NCV testing of the ulnar nerve at the elbow is not as reliable as carpal tunnel syndrome.  Surgical intervention in the form of in situ release of the ulnar nerve at the elbow or ulnar nerve transposition may be required in up to 20% of patients.  Patient wishes to proceed with a right carpal and cubital tunnel releases same day at [facility].  Patient's hand will be wrapped for 5 days postoperatively with no heavy lifting and needs to be covered for showering.  After 5 days postoperative dressings can come off and she can wash with warm water, soap and pat dry but still no submerging.  Sutures will come up between 10 and 14 days postoperatively.  Patient shows understanding and agrees with this plan of care.  Will follow-up with her postoperatively.           The patient verbalized understanding of exam findings and treatment plan. We engaged in the shared decision-making process and treatment options were discussed at length with the patient. Surgical and conservative management discussed today along with risks and benefits.     Diagnoses and all orders for this visit:     Carpal tunnel syndrome on right  -     Case request operating room: RELEASE CUBITAL TUNNEL, RELEASE CARPAL TUNNEL; Standing  -     Case request operating room: RELEASE CUBITAL TUNNEL, RELEASE CARPAL TUNNEL     Cubital tunnel syndrome on right  -     Case request operating room: RELEASE CUBITAL TUNNEL, RELEASE CARPAL TUNNEL; Standing  -     Case request operating room: RELEASE CUBITAL TUNNEL, RELEASE CARPAL TUNNEL     Other orders  -      Nursing Communication Warmimg Interventions Implemented; Standing  -     Nursing Communication CHG bath, have staff wash entire body (neck down) per pre-op bathing protocol. Routine, evening prior to, and day of surgery.; Standing  -     Nursing Communication Swab both nares with Povidone-Iodine solution, EXCLUDE if patient has shellfish/Iodine allergy, and replace with nasal alcohol swabstick. Routine, day of surgery, on call to OR; Standing  -     chlorhexidine (PERIDEX) 0.12 % oral rinse 15 mL  -     Void on call to OR; Standing  -     Insert peripheral IV; Standing  -     Diet NPO; Sips with meds; Standing  -     sodium chloride 0.9 % infusion  -     ceFAZolin (ANCEF) 1,000 mg in sodium chloride 0.9 % 50 mL IVPB             Cubital Tunnel Release:   The anatomy and physiology of cubital tunnel syndrome were discussed with the patient today in the office.  Typically, increased elbow flexion activities decrease blood flow within the intraneural spaces, resulting in a feeling of numbness, tingling, weakness, or clumsiness within the hand and fingers.  Occasionally, anatomic structures such as medial elbow osteophytes, the medial head of the triceps, were subluxing ulnar nerve may result in increased pressure or aggravation at the cubital tunnel.  Typical signs and symptoms usually include numbness and tingling within the ring and small finger, weakness with , and weakness with pinch.  Conservative treatment and includes nocturnal bracing to keep the elbow in a semi-extended position, activity modification, therapy, and avoiding excessive elbow flexion activities.  A majority of patients typically respond to conservative treatment over a period of approximately 3-6 months.  Vitamin B6 one tablet daily over the counter may helpful to reduce symptoms.  EMG/NCV testing of the ulnar nerve at the elbow is not as reliable as carpal tunnel syndrome.  Surgical intervention in the form of in situ release of the ulnar  nerve at the elbow or ulnar nerve transposition may be required in up to 20% of patients. The patient has elected to undergo cubital tunnel release.  The possibility of converting to a subcutaneous or submuscular ulnar nerve transposition depending on the nerve stability was discussed with the patient.  Typically, in the postoperative period, light activities are allowed immediately, driving is allowed when narcotic medications have stopped, and the incision may get wet after 5 days.  Heavy activities will be allowed after follow up appointment in 1-2 weeks.  While the pain within the ring and small finger of the hands generally improves rapidly, the numbness and tingling, as well as the strength, will slowly improve over a period of weeks to months.  Total recovery can take up to 18 months from the time of surgery.  Numbness and tingling near the incision, or near the medial aspect of the forearm was discussed with the patient.  The patient has an understanding of the above mentioned discussion. The risks and benefits of the procedure were explained to the patient, which include, but are not limited to: Bleeding, infection, recurrence, pain, scar, damage to tendons, damage to nerves, and damage to blood vessels, failure to give desired results and complications related to anesthesia.  These risks, along with alternative conservative treatment options, and postoperative protocols were voiced back and understood by the patient.  All questions were answered to the patient's satisfaction.  The patient agrees to comply with a standard postoperative protocol, and is willing to proceed.  Education was provided via written and auditory forms.  There were no barriers to learning. Written handouts regarding wound care, incision and scar care, and general preoperative information was provided to the patient.  Prior to surgery, the patient may be requested to stop all anti-inflammatory medications.  Prophylactic aspirin,  Plavix, and Coumadin may be allowed to be continued.  Medications including vitamin E., ginkgo, and fish oil are requested to be stopped approximately one week prior to surgery.  Hypertensive medications and beta blockers, if taken, should be continued. Vitamin B6 one tablet daily over the counter may helpful to reduce symptoms.   and Open Carpal Tunnel Release:   The anatomy and physiology of carpal tunnel syndrome was discussed with the patient today.  Increase pressure localized under the transverse carpal ligament can cause pain, numbness, tingling, or dysesthesias within the median nerve distribution as well as feelings of fatigue, clumsiness, or awkwardness.  These symptoms typically occur at night and worse in the morning upon waking.  Eventually, untreated carpal tunnel syndrome can result in weakness and permanent loss of muscle within the thenar compartment of the hand.  Treatment options were discussed with the patient.  Conservative treatment includes nocturnal resting splints to keep the nerve in a neutral position, ergonomic changes within the work or home environment, activity modification, and tendon gliding exercises.  Vitamin B6 one tablet daily over the counter may helpful to reduce symptoms.  Steroid injections within the carpal canal can help a majority of patients, however this is often self-limited in a majority of patients.  Surgical intervention to divide the transverse carpal ligament typically results in a long-lasting relief of the patient's complaints, with the recurrence rate of less than 1%. The patient has elected to undergo an open carpal tunnel release.  The palmar incision technique was discussed in the office with the patient today.   In the postoperative period, light activities are allowed immediately, driving is allowed when narcotic medication has stopped, and the bandages may be removed and incision may get wet after 2 days.  Heavy activities (lifting more than approximately  10 pounds) will be allowed after follow up appointment in 1-2 weeks.  While night symptoms (waking from sleep, pain, and discomfort in the hands) generally improves rapidly, the numbness and tingling as well as the strength will slowly improve over weeks to months depending on the chronicity and severity of the carpal tunnel syndrome.  Pillar pain and scar discomfort were discussed with the patient which are self-limiting conditions.  The risks of bleeding and infection from the surgery are less than 1%.  Risk of recurrence is approximately 0.5%.  The risks of nerve injury or nerve damage or damage to the blood vessels is approximately 1 in 1200. The patient has an understanding of the above mentioned discussion.The risks and benefits of the procedure were explained to the patient, which include, but are not limited to: Bleeding, infection, recurrence, pain, scar, damage to tendons, damage to nerves, and damage to blood vessels, failure to give desired results and complications related to anesthesia.  These risks, along with alternative conservative treatment options, and postoperative protocols were voiced back and understood by the patient.  All questions were answered to the patient's satisfaction.  The patient agrees to comply with a standard postoperative protocol, and is willing to proceed.  Education was provided via written and auditory forms.  There were no barriers to learning. Written handouts regarding wound care, incision and scar care, and general preoperative information was provided to the patient.  Prior to surgery, the patient may be requested to stop all anti-inflammatory medications.  Prophylactic aspirin, Plavix, and Coumadin may be allowed to be continued.  Medications including vitamin E., ginkgo, and fish oil are requested to be stopped approximately one week prior to surgery.  Hypertensive medications and beta blockers, if taken, s     Follow Up:  Return for Follow up post- op, After  Surgery.        To Do Next Visit:  Re-evaluation of current issue     ____________________________________________________________________________________________________________________________________________        CHIEF COMPLAINT:       Chief Complaint   Patient presents with    Follow-up       Sign up for carpal and cubital         SUBJECTIVE:  Karine Downs is a 48 y.o. year old LHD female who presents today for follow-up for her known right carpal and cubital tunnel syndromes noted on ultrasound.  Patient is 5 months status post left carpal and cubital tunnel releases and is doing well.  Her symptoms are completely resolved.  Patient has been wearing her cock-up wrist brace at night for the right side which has kept her symptoms at bay.        I have personally reviewed all the relevant PMH, PSH, SH, FH, Medications and allergies.      PAST MEDICAL HISTORY:  Medical History        Past Medical History:   Diagnosis Date    Allergic       Penicillin    Internal derangement of knee joint, left       resolved 10/04/2017    Lyme disease      PONV (postoperative nausea and vomiting)      Varicella              PAST SURGICAL HISTORY:  Surgical History         Past Surgical History:   Procedure Laterality Date    CARPAL TUNNEL RELEASE        ME HYSTEROSCOPY BX ENDOMETRIUM&/POLYPC W/WO D&C N/A 01/31/2020     Procedure: DILATATION AND CURETTAGE (D&C) WITH HYSTEROSCOPY;  Surgeon: Mirlande Fisher MD;  Location: [facility];  Service: Gynecology    ME HYSTEROSCOPY ENDOMETRIAL ABLATION N/A 01/31/2020     Procedure: ABLATION ENDOMETRIAL NOVASURE;  Surgeon: Mirlande Fisher MD;  Location: [facility];  Service: Gynecology    ME LAPAROSCOPY W/RMVL ADNEXAL STRUCTURES Bilateral 01/31/2020     Procedure: SALPINGECTOMY, LAPAROSCOPIC BILATERAL;  Surgeon: Mirlande Fisher MD;  Location: [facility];  Service: Gynecology    ME NEUROPLASTY &/TRANSPOS MEDIAN NRV CARPAL TUNNE Left 03/08/2024     Procedure: RELEASE CARPAL  TUNNEL;  Surgeon: Annemarie العراقي MD;  Location: [facility];  Service: Orthopedics    OK NEUROPLASTY &/TRANSPOSITION ULNAR NERVE ELBOW Left 03/08/2024     Procedure: RELEASE CUBITAL TUNNEL with transposition and adipose flap;  Surgeon: Annemarie العراقي MD;  Location: [facility];  Service: Orthopedics    OK REMOVAL INTRAUTERINE DEVICE IUD N/A 01/31/2020     Procedure: REMOVAL OF INTRAUTERINE DEVICE (IUD);  Surgeon: Mirlande Fisher MD;  Location: [facility];  Service: Gynecology    WISDOM TOOTH EXTRACTION                FAMILY HISTORY:  Family History         Family History   Problem Relation Age of Onset    Thyroid disease Mother      Thyroid disease Sister      Thyroid disease Sister      Heart disease Maternal Grandmother           cardiac disorder    Thyroid disease Maternal Grandmother      Heart disease Maternal Grandfather      No Known Problems Paternal Grandmother      No Known Problems Maternal Aunt      Breast cancer Neg Hx      Colon cancer Neg Hx      Ovarian cancer Neg Hx      Cervical cancer Neg Hx      Uterine cancer Neg Hx              SOCIAL HISTORY:  Social History   Social History            Tobacco Use    Smoking status: Every Day       Current packs/day: 0.50       Average packs/day: 0.7 packs/day for 30.0 years (20.0 ttl pk-yrs)       Types: Cigarettes    Smokeless tobacco: Never   Vaping Use    Vaping status: Former    Substances: Nicotine, Flavoring   Substance Use Topics    Alcohol use: Yes       Comment: socially    Drug use: No            MEDICATIONS:    Current Medications      Current Outpatient Medications:     5-Hydroxytryptophan (5-HTP) 100 MG CAPS, , Disp: , Rfl:     Ascorbic Acid (VITAMIN C PO), Take by mouth, Disp: , Rfl:     Cholecalciferol (VITAMIN D3) 1000 units CAPS, Daily, Disp: , Rfl:     Cyanocobalamin (VITAMIN B-12 PO), Take by mouth, Disp: , Rfl:     MAGNESIUM PO, Take by mouth, Disp: , Rfl:     Multiple Vitamin (MULTIVITAMIN) tablet, Take 1 tablet by mouth  daily, Disp: , Rfl:     Progesterone 100 MG CAPS, Take 1 capsule by mouth daily at bedtime, Disp: 90 capsule, Rfl: 4    Turmeric (QC Tumeric Complex) 500 MG CAPS, , Disp: , Rfl:     GLUCOSAMINE-CHONDROITIN-MSM-D PO, , Disp: , Rfl:     methylPREDNISolone 4 MG tablet therapy pack, Use as directed on package, Disp: 21 each, Rfl: 0    Misc Natural Products (Cortisol Manager) TABS, Take 1 tablet by mouth every other day (Patient not taking: Reported on 6/21/2024), Disp: , Rfl:     Multiple Vitamins-Minerals (AIRBORNE PO), Take by mouth (Patient not taking: Reported on 6/21/2024), Disp: , Rfl:         ALLERGIES:  Allergies         Allergies   Allergen Reactions    Penicillins Other (See Comments)       Childhood allergy            REVIEW OF SYSTEMS:  Review of Systems   Constitutional:  Negative for chills, fever and unexpected weight change.   HENT:  Negative for hearing loss, nosebleeds and sore throat.    Eyes:  Negative for pain, redness and visual disturbance.   Respiratory:  Negative for cough, shortness of breath and wheezing.    Cardiovascular:  Negative for chest pain, palpitations and leg swelling.   Gastrointestinal:  Negative for abdominal pain and nausea.   Genitourinary:  Negative for dyspareunia, dysuria and frequency.   Musculoskeletal:  Negative for arthralgias and joint swelling.   Skin:  Negative for rash and wound.   Neurological:  Positive for numbness. Negative for dizziness and headaches.   Psychiatric/Behavioral:  Negative for decreased concentration and suicidal ideas. The patient is not nervous/anxious.          VITALS:  There were no vitals filed for this visit.        _____________________________________________________  PHYSICAL EXAMINATION:  General: well developed and well nourished, alert, oriented times 3, and appears comfortable  Psychiatric: Normal  HEENT: Normocephalic, Atraumatic Trachea Midline, No torticollis  Pulmonary: No audible wheezing or respiratory distress   Cardiovascular:  No pitting edema, 2+ radial pulse   Abdominal/GI: abdomen non tender, non distended   Skin: No masses, erythema, lacerations, fluctation, ulcerations  Neurovascular: Sensation Intact to the Radial Nerve, Decreased Sensation to  the Median Nerve, Decreased Sensation to  the Ulnar Nerve, Motor Intact to the Median, Ulnar, Radial Nerve, and Pulses Intact  Musculoskeletal: Normal, except as noted in detailed exam and in HPI.        MUSCULOSKELETAL EXAMINATION:     Right Carpal Tunnel Exam:     Negative thenar atrophy. Negative phalen's test. Positive carpal tunnel compression. Positive tinels over median nerve at the wrist.  Opposition strength 5/5.  Abduction strength 5/5.       Right Ulnar Nerve Exam:     Negative intrinsic atrophy.  Negative  deformity at the elbow.   Full range of motion with flexion and extension of the elbow.   Negative ulnar nerve compression test at the elbow. Positive tinels over the ulnar nerve at the elbow.  Negative cross finger test in the index and long fingers.   Intrinsic strength 5/5 .     ___________________________________________________     STUDIES REVIEWED:     I have personally reviewed and interpreted US of Bilateral elbows reviewed from 2/28/2024 which demonstrate IMPRESSION:     RIGHT CUBITAL TUNNEL: Enlarged ulnar nerve near/within cubital tunnel suspicious for ulnar neuropathy/cubital tunnel syndrome.     LEFT CUBITAL TUNNEL: Enlarged ulnar nerve near/within cubital tunnel, suggestive of ulnar neuropathy/cubital tunnel syndrome. There is also evidence of ulnar nerve dislocation from the cubital tunnel on dynamic flexion-extension evaluation.      US of the Bilateral wrists:  IMPRESSION:     RIGHT SIDE: Carpal tunnel syndrome ruled in based on marked abnormal CSA > 14 sq mm.     LEFT SIDE: Carpal tunnel syndrome ruled in based on marked abnormal CSA > 14 sq mm.     LABS REVIEWED:     HgA1c:         Lab Results   Component Value Date     HGBA1C 5.1 04/19/2018      BMP:          Lab Results   Component Value Date     CALCIUM 10.1 09/14/2023     K 3.8 09/14/2023     CO2 31 09/14/2023      09/14/2023     BUN 14 09/14/2023     CREATININE 0.94 09/14/2023

## 2024-09-26 PROBLEM — G56.01 CARPAL TUNNEL SYNDROME ON RIGHT: Status: ACTIVE | Noted: 2024-09-26

## 2024-09-26 NOTE — DISCHARGE INSTR - AVS FIRST PAGE
Post Operative Instructions    You have had surgery on your arm today, please read and follow the information below:  Elevate your hand above your elbow during the next 24-48 hours to help with swelling.  Place your hand and arm over your head with motion at your shoulder three times a day.  Do not apply any cream/ointment/oil to your incisions including antibiotics.  Do not soak your hands in standing water (dishwater, tubs, Jacuzzi's, pools, etc.) until given permission (typically 2-3 weeks after injury)    Call the office if you notice any:  Increased numbness or tingling of your hand or fingers that is not relieved with elevation.  Increasing pain that is not controlled with medication.  Difficulty chewing, breathing, swallowing.  Chest pains or shortness of breath.  Fever over 101.4 degrees.    Bandage: Please keep bandages clean and dry. Remove bandage after 5 days. Once bandages are removed you may wash hands with soap and water. Short showers are okay as well, but please avoid soaking the hand as described above (ie no pools, baths, dirty dish water, hot tubs, ocean/lake water, etc). Sutures will be removed in the office at your first follow up visit, please do not remove them yourself.    Please do NOT put any topical agents on the surgical wound including neosporin, peroxide, tea tree oil, vitamin E, etc. as these can delay wound healing.    Motion: Move fingers into a fist 5 times a day, DO NOT move any splinted fingers.    Weight bearing status: Avoid heavy lifting (>5 pounds) with the extremity that was operated on until follow up appointment. Normal activities of daily living are OK.    Ice: Ice for 10 minutes every hour as needed for swelling x 24 hours.    Sling: Please use your sling while your arm is numb from the block. When your arm is FULLY awake again, you no longer need this and may use your sling as needed for comfort. While using the sling, make sure to move your shoulder throughout the day  to prevent stiffness here.     Pain medication:   Naproxen 220 mg two times a day (this is over the counter!)  *do not take this medication if you were told by your doctor that you cannot take anti-inflammatories or NSAIDS  Tylenol Extended Release 650 mg every 8 hours (this is over the counter!)   Percocet one tab every 6 hours ONLY AS NEEDED for severe pain        Follow-up Appointment: 10-14 days with Dr العراقي        Please call the office if you have any questions or concerns regarding your post-operative care.

## 2024-09-26 NOTE — ANESTHESIA PREPROCEDURE EVALUATION
Procedure:  RELEASE CUBITAL TUNNEL (Right: Elbow)  RELEASE CARPAL TUNNEL (Right: Wrist)    Relevant Problems   CARDIO   (+) Atypical chest pain   (+) Migraine headache      GI/HEPATIC   (+) Esophageal reflux      NEURO/PSYCH   (+) Migraine headache      PONV  Daily smoker      Physical Exam    Airway    Mallampati score: II  TM Distance: >3 FB  Neck ROM: full     Dental       Cardiovascular  Cardiovascular exam normal    Pulmonary  Pulmonary exam normal     Other Findings  post-pubertal.      Anesthesia Plan  ASA Score- 2     Anesthesia Type- regional with ASA Monitors.         Additional Monitors:     Airway Plan:            Plan Factors-    Chart reviewed. EKG reviewed.  Existing labs reviewed. Patient summary reviewed.                  Induction- intravenous.    Postoperative Plan-         Informed Consent- Anesthetic plan and risks discussed with patient.  I personally reviewed this patient with the CRNA. Discussed and agreed on the Anesthesia Plan with the CRNA..

## 2024-09-27 ENCOUNTER — HOSPITAL ENCOUNTER (OUTPATIENT)
Age: 49
Setting detail: OUTPATIENT SURGERY
Discharge: HOME | End: 2024-09-27
Attending: SURGERY | Admitting: SURGERY
Payer: COMMERCIAL

## 2024-09-27 ENCOUNTER — ANESTHESIA (OUTPATIENT)
Age: 49
End: 2024-09-27
Payer: COMMERCIAL

## 2024-09-27 VITALS
SYSTOLIC BLOOD PRESSURE: 117 MMHG | HEART RATE: 72 BPM | DIASTOLIC BLOOD PRESSURE: 75 MMHG | HEIGHT: 63 IN | OXYGEN SATURATION: 98 % | RESPIRATION RATE: 18 BRPM | WEIGHT: 123 LBS | TEMPERATURE: 97.8 F | BODY MASS INDEX: 21.79 KG/M2

## 2024-09-27 DIAGNOSIS — G56.23 CUBITAL TUNNEL SYNDROME OF BOTH UPPER EXTREMITIES: Primary | ICD-10-CM

## 2024-09-27 DIAGNOSIS — Z47.89 AFTERCARE FOLLOWING SURGERY OF THE MUSCULOSKELETAL SYSTEM: ICD-10-CM

## 2024-09-27 DIAGNOSIS — G56.01 CARPAL TUNNEL SYNDROME ON RIGHT: ICD-10-CM

## 2024-09-27 PROCEDURE — 64721 CARPAL TUNNEL SURGERY: CPT | Performed by: SURGERY

## 2024-09-27 PROCEDURE — 64718 REVISE ULNAR NERVE AT ELBOW: CPT | Performed by: SURGERY

## 2024-09-27 PROCEDURE — 64721 CARPAL TUNNEL SURGERY: CPT | Performed by: PHYSICIAN ASSISTANT

## 2024-09-27 PROCEDURE — 64718 REVISE ULNAR NERVE AT ELBOW: CPT | Performed by: PHYSICIAN ASSISTANT

## 2024-09-27 RX ORDER — SODIUM CHLORIDE 9 MG/ML
75 INJECTION, SOLUTION INTRAVENOUS CONTINUOUS
Status: DISCONTINUED | OUTPATIENT
Start: 2024-09-27 | End: 2024-09-27 | Stop reason: HOSPADM

## 2024-09-27 RX ORDER — FENTANYL CITRATE 50 UG/ML
INJECTION, SOLUTION INTRAMUSCULAR; INTRAVENOUS AS NEEDED
Status: DISCONTINUED | OUTPATIENT
Start: 2024-09-27 | End: 2024-09-27

## 2024-09-27 RX ORDER — FENTANYL CITRATE/PF 50 MCG/ML
25 SYRINGE (ML) INJECTION
Status: DISCONTINUED | OUTPATIENT
Start: 2024-09-27 | End: 2024-09-27 | Stop reason: HOSPADM

## 2024-09-27 RX ORDER — CEFAZOLIN SODIUM 1 G/50ML
1000 SOLUTION INTRAVENOUS ONCE
Status: COMPLETED | OUTPATIENT
Start: 2024-09-27 | End: 2024-09-27

## 2024-09-27 RX ORDER — SCOLOPAMINE TRANSDERMAL SYSTEM 1 MG/1
1 PATCH, EXTENDED RELEASE TRANSDERMAL ONCE
Status: DISCONTINUED | OUTPATIENT
Start: 2024-09-27 | End: 2024-09-27 | Stop reason: HOSPADM

## 2024-09-27 RX ORDER — OXYCODONE AND ACETAMINOPHEN 5; 325 MG/1; MG/1
1 TABLET ORAL EVERY 4 HOURS PRN
Qty: 12 TABLET | Refills: 0 | Status: SHIPPED | OUTPATIENT
Start: 2024-09-27 | End: 2024-10-07

## 2024-09-27 RX ORDER — MAGNESIUM HYDROXIDE 1200 MG/15ML
LIQUID ORAL AS NEEDED
Status: DISCONTINUED | OUTPATIENT
Start: 2024-09-27 | End: 2024-09-27 | Stop reason: HOSPADM

## 2024-09-27 RX ORDER — PROPOFOL 10 MG/ML
INJECTION, EMULSION INTRAVENOUS CONTINUOUS PRN
Status: DISCONTINUED | OUTPATIENT
Start: 2024-09-27 | End: 2024-09-27

## 2024-09-27 RX ORDER — BUPIVACAINE HYDROCHLORIDE 5 MG/ML
INJECTION, SOLUTION EPIDURAL; INTRACAUDAL AS NEEDED
Status: DISCONTINUED | OUTPATIENT
Start: 2024-09-27 | End: 2024-09-27

## 2024-09-27 RX ORDER — ONDANSETRON 2 MG/ML
4 INJECTION INTRAMUSCULAR; INTRAVENOUS ONCE AS NEEDED
Status: DISCONTINUED | OUTPATIENT
Start: 2024-09-27 | End: 2024-09-27 | Stop reason: HOSPADM

## 2024-09-27 RX ORDER — CHLORHEXIDINE GLUCONATE ORAL RINSE 1.2 MG/ML
15 SOLUTION DENTAL ONCE
Status: COMPLETED | OUTPATIENT
Start: 2024-09-27 | End: 2024-09-27

## 2024-09-27 RX ORDER — MIDAZOLAM HYDROCHLORIDE 2 MG/2ML
INJECTION, SOLUTION INTRAMUSCULAR; INTRAVENOUS AS NEEDED
Status: DISCONTINUED | OUTPATIENT
Start: 2024-09-27 | End: 2024-09-27

## 2024-09-27 RX ORDER — ONDANSETRON 2 MG/ML
INJECTION INTRAMUSCULAR; INTRAVENOUS AS NEEDED
Status: DISCONTINUED | OUTPATIENT
Start: 2024-09-27 | End: 2024-09-27

## 2024-09-27 RX ORDER — PROPOFOL 10 MG/ML
INJECTION, EMULSION INTRAVENOUS AS NEEDED
Status: DISCONTINUED | OUTPATIENT
Start: 2024-09-27 | End: 2024-09-27

## 2024-09-27 RX ADMIN — MIDAZOLAM 2 MG: 1 INJECTION INTRAMUSCULAR; INTRAVENOUS at 07:29

## 2024-09-27 RX ADMIN — PROPOFOL 50 MG: 10 INJECTION, EMULSION INTRAVENOUS at 09:07

## 2024-09-27 RX ADMIN — CHLORHEXIDINE GLUCONATE 15 ML: 1.2 RINSE ORAL at 07:01

## 2024-09-27 RX ADMIN — FENTANYL CITRATE 25 MCG: 50 INJECTION INTRAMUSCULAR; INTRAVENOUS at 09:20

## 2024-09-27 RX ADMIN — SODIUM CHLORIDE 75 ML/HR: 0.9 INJECTION, SOLUTION INTRAVENOUS at 07:01

## 2024-09-27 RX ADMIN — FENTANYL CITRATE 100 MCG: 50 INJECTION INTRAMUSCULAR; INTRAVENOUS at 07:29

## 2024-09-27 RX ADMIN — FENTANYL CITRATE 25 MCG: 50 INJECTION INTRAMUSCULAR; INTRAVENOUS at 09:07

## 2024-09-27 RX ADMIN — PROPOFOL 100 MCG/KG/MIN: 10 INJECTION, EMULSION INTRAVENOUS at 09:07

## 2024-09-27 RX ADMIN — CEFAZOLIN SODIUM 1000 MG: 1 SOLUTION INTRAVENOUS at 09:08

## 2024-09-27 RX ADMIN — ONDANSETRON 4 MG: 2 INJECTION INTRAMUSCULAR; INTRAVENOUS at 09:15

## 2024-09-27 RX ADMIN — SCOPALAMINE 1 PATCH: 1 PATCH, EXTENDED RELEASE TRANSDERMAL at 07:19

## 2024-09-27 RX ADMIN — BUPIVACAINE HYDROCHLORIDE 30 ML: 5 INJECTION, SOLUTION EPIDURAL; INTRACAUDAL; PERINEURAL at 07:36

## 2024-09-27 NOTE — ANESTHESIA POSTPROCEDURE EVALUATION
Post-Op Assessment Note    CV Status:  Stable    Pain management: adequate       Mental Status:  Alert and awake   Hydration Status:  Euvolemic   PONV Controlled:  Controlled   Airway Patency:  Patent     Post Op Vitals Reviewed: Yes    No anethesia notable event occurred.    Staff: CRNA               BP   116/72   Temp   97.2   Pulse  70   Resp   18   SpO2   99

## 2024-09-27 NOTE — ANESTHESIA PROCEDURE NOTES
Peripheral Block    Patient location during procedure: holding area  Start time: 9/27/2024 7:36 AM  Reason for block: at surgeon's request and post-op pain management  Staffing  Performed by: Reggie Mccullough MD  Authorized by: Reggie Mccullough MD    Preanesthetic Checklist  Completed: patient identified, IV checked, site marked, risks and benefits discussed, surgical consent, monitors and equipment checked, pre-op evaluation and timeout performed  Peripheral Block  Patient position: sitting  Prep: ChloraPrep  Patient monitoring: frequent blood pressure checks, continuous pulse oximetry and heart rate  Block type: Supraclavicular  Laterality: right  Injection technique: single-shot  Procedures: ultrasound guided, Ultrasound guidance required for the procedure to increase accuracy and safety of medication placement and decrease risk of complications.  Ultrasound permanent image saved    Needle  Needle type: Stimuplex   Needle gauge: 20 G  Needle length: 4 in  Needle localization: anatomical landmarks and ultrasound guidance  Assessment  Injection assessment: incremental injection, frequent aspiration, injected with ease, negative aspiration, negative for heart rate change, no paresthesia on injection, no symptoms of intraneural/intravenous injection and needle tip visualized at all times  Paresthesia pain: none  Post-procedure:  site cleaned  patient tolerated the procedure well with no immediate complications

## 2024-09-27 NOTE — OP NOTE
OPERATIVE REPORT  PATIENT NAME: Karine Downs  :  1975  MRN: [id number]  Pt Location: [facility]    SURGERY DATE: 24    Surgeons and Role:     * Annemarie العراقي MD - Primary     * Emy Saez PA-C - Assisting    Pre-Op Diagnosis:  Cubital tunnel syndrome of both upper extremities G56.23  Carpal tunnel syndrome on right G56.01    Post-Op Diagnosis Codes:     * Cubital tunnel syndrome of both upper extremities [G56.23]     * Carpal tunnel syndrome on right [G56.01]    Procedure(s):  RELEASE CUBITAL TUNNEL (Right)  RELEASE CARPAL TUNNEL (Right)    Specimen(s):  No specimens collected during this procedure.    Estimated Blood Loss:   Minimal    Anesthesia Type:   Regional with Sedation    IMPLANTS:  * No implants in log *    PERIOPERATIVE ANTIBIOTICS:    cefazolin, 2 grams    Tourniquet Time: 21 minutes  at 250 mmHg          Operative Indications:  The patient has a history of Carpal Tunnel Syndrome  right and Cubital Tunnel Syndrome  right that was recalcitrant to conservative management.  The decision was made to bring the patient to the operating room for Open Carpal Tunnel Release  right and Cubital Tunnel Release  right.  Risks of the procedure were explained which include, but are not limited to bleeding; infection; damage to nerves, arteries,veins, tendons; scar; pain; need for reoperation; failure to give desired result; and risks of anaesthesia.  All questions were answered to satisfaction and they were willing to proceed.         Operative Findings:  Hypertrophy TCL   Ulnar nerve compression at Rivera's fascia    Complications:   None    Procedure and Technique:  After the patient, site, and procedure were identified, the patient was brought into the operating room in a supine position.  Regional anaesthesia and sedation were provided.    The  right upper extremity was then prepped and drapped in a normal, sterile, orthopedic fashion.    A medium Hemoclear was applied in sterile fashion    An  anterior approach to the carpal tunnel was undertaken. A 1 cm incision was made in line with the fourth digit, proximal to Grimes's line.  The skin and the subcutaneous tissue were sharply incised.  Under loupe magnification, dissection was carried down to the palmar fascia and it was incised. The transverse carpal ligament was visualized and  Released distally with a #64 blade. A freer was was passed above and below the TCL in a retrograde fashion, freeing up any adhesions. A Knife Lite was used to release the proximal portion of the transverse carpal ligament under direct visualization.  Distally the superficial arch was identified.  A complete release was carried out and exploration of the carpal canal revealed no significant tenosynovitis. The median nerve and its branches were intact.      A 4 cm Incision was made with a 15 blade between medial epicondyle and olecranon.  A branch of the MABC was identified distally and protected.  The ulnar nerve was identified at the level of the cubital tunnel.  The nerve was not found to be subluxed.  The Ulnar nerve was traced 1st traced proximally.  Proximal release was performed under direct visualization  the overlying fascia was released with a scissor up to the level of the intermuscular septum which was incised.  Next dissection was taken proximally both with a scissor and with a 64 blade.  Care was taken preserve any crossing branches of the medial antebrachial cutaneous nerve.  Decompression was taken around the olecranon. An anconeus epitrochlearis was note. The superficial fascia overlying the muscle was release and the muscle gently dissected away.  The deep fascia of the anconeus epitrochlear areas was then gently released under direct visualization.  Dissection was then taken distal to the olecranon, to the FCU.  The superficial layer of the  FCU fascia was released with a knife and the 2 bellies of the FCU spread gently with a scissor.  The deep fascia of the  FCU was identified and with the nerve protected all times incised.  Release was taken just past the level of the 1st motor branch to the FCU.  Once a complete release was verified,  the Elbow was taken through a full range of motion and the nerve was found to be stable with no subluxation. After the release, it was appreciated that the nerve had been significantly constricted at the level of chamorro's fascia with flattening of the nerve   The tourniquet was brought down and hemostasis was obtained. The wound was copiously irrigated and closed in a layered fashion with 4-0 monocryl  for deep dermal .       At the completion of the procedure, hemostasis was obtained with cautery and direct pressure.  The wounds were copiously irrigated with sterile solution.  The wounds were closed with Prolene, Monocryl, and Steri-strips.  Sterile dressings were applied, including Xeroform, gauze, tweeners, webril, ACE and Sling.  Please note, all sponge, needle, and instrument counts were correct prior to closure.  Loupe magnification was utilized.  The patient tolerated the procedure well.     I was present for the entire procedure., A qualified resident physician was not available., and A physician assistant was required during the procedure for retraction, tissue handling, dissection and suturing.     The aid of Emy MENDENHALL was required in the approach dissection and handling of soft tissues with retraction and with closure during this cubital tunnel release.  Her aide was instrumental in protecting the medial antebrachial cutaneous branches  during the approach as well as a allowing  for adequate visualization through a minimally invasive incision      Patient Disposition:  PACU     SIGNATURE: Annemarie العراقي MD  DATE: 09/27/24  TIME: 9:37 AM

## 2024-10-04 ENCOUNTER — TELEPHONE (OUTPATIENT)
Dept: FAMILY MEDICINE CLINIC | Facility: CLINIC | Age: 49
End: 2024-10-04

## 2024-10-10 ENCOUNTER — OFFICE VISIT (OUTPATIENT)
Dept: OBGYN CLINIC | Facility: CLINIC | Age: 49
End: 2024-10-10

## 2024-10-10 VITALS — HEIGHT: 63 IN | BODY MASS INDEX: 21.79 KG/M2 | WEIGHT: 123 LBS

## 2024-10-10 DIAGNOSIS — G56.21 CUBITAL TUNNEL SYNDROME ON RIGHT: ICD-10-CM

## 2024-10-10 DIAGNOSIS — G56.01 CARPAL TUNNEL SYNDROME ON RIGHT: Primary | ICD-10-CM

## 2024-10-10 PROCEDURE — 99024 POSTOP FOLLOW-UP VISIT: CPT | Performed by: SURGERY

## 2024-10-10 NOTE — PROGRESS NOTES
Assessment/Plan:  Patient ID: Karine Downs 48 y.o. female   Surgery: Release Cubital Tunnel - Right and Release Carpal Tunnel - Right  Date of Surgery: 9/27/2024    Resume activities as tolerated and scar tissue massage. She will follow up with the clinic as needed.     Follow Up:  PRN    To Do Next Visit:        CHIEF COMPLAINT:  No chief complaint on file.        SUBJECTIVE:  Karine Downs is a 48 y.o. year old female who presents for follow up after Release Cubital Tunnel - Right and Release Carpal Tunnel - Right. Today patient has Stiffness. She states that her symptoms prior to surgery have been resolved. She has a numb patch on her elbow that she would like to discusses today.       PHYSICAL EXAMINATION:  General: well developed and well nourished, alert, oriented times 3, and appears comfortable  Psychiatric: Normal    MUSCULOSKELETAL EXAMINATION:  Incision: Clean, dry, intact  Surgery Site: normal, no evidence of infection   Range of Motion: As expected  Neurovascular status: Neuro intact, good cap refill  Activity Restrictions: No restrictions           STUDIES REVIEWED:      LABS REVIEWED:    HgA1c:   Lab Results   Component Value Date    HGBA1C 5.1 04/19/2018     BMP:   Lab Results   Component Value Date    CALCIUM 10.1 09/14/2023    K 3.8 09/14/2023    CO2 31 09/14/2023     09/14/2023    BUN 14 09/14/2023    CREATININE 0.94 09/14/2023           PROCEDURES PERFORMED:  Procedures  No Procedures performed today    Scribe Attestation      I,:   am acting as a scribe while in the presence of the attending physician.:       I,:   personally performed the services described in this documentation    as scribed in my presence.:

## 2024-12-27 ENCOUNTER — OFFICE VISIT (OUTPATIENT)
Dept: FAMILY MEDICINE CLINIC | Facility: CLINIC | Age: 49
End: 2024-12-27
Payer: COMMERCIAL

## 2024-12-27 VITALS
WEIGHT: 129 LBS | HEART RATE: 88 BPM | SYSTOLIC BLOOD PRESSURE: 122 MMHG | HEIGHT: 63 IN | DIASTOLIC BLOOD PRESSURE: 74 MMHG | BODY MASS INDEX: 22.86 KG/M2 | TEMPERATURE: 97.9 F | OXYGEN SATURATION: 98 % | RESPIRATION RATE: 16 BRPM

## 2024-12-27 DIAGNOSIS — J01.40 ACUTE NON-RECURRENT PANSINUSITIS: Primary | ICD-10-CM

## 2024-12-27 DIAGNOSIS — F17.200 TOBACCO DEPENDENCE: ICD-10-CM

## 2024-12-27 PROCEDURE — 99213 OFFICE O/P EST LOW 20 MIN: CPT | Performed by: INTERNAL MEDICINE

## 2024-12-27 RX ORDER — SULFAMETHOXAZOLE AND TRIMETHOPRIM 800; 160 MG/1; MG/1
1 TABLET ORAL 2 TIMES DAILY
Qty: 20 TABLET | Refills: 0 | Status: SHIPPED | OUTPATIENT
Start: 2024-12-27 | End: 2025-01-06

## 2024-12-27 NOTE — PROGRESS NOTES
"Name: Karine Downs      : 1975      MRN: [id number]  Encounter Provider: Nunu Lemos MD  Encounter Date: 2024   Encounter department: [facility]  :  Assessment & Plan  Acute non-recurrent pansinusitis  Complains of increasing sinus congestion blocked ears sore throat and headaches purulent discharge we will treat her as a sinusitis    Orders:    sulfamethoxazole-trimethoprim (BACTRIM DS) 800-160 mg per tablet; Take 1 tablet by mouth 2 (two) times a day for 10 days    Tobacco dependence  She continues to smoke and was counsuled  on ways to and benifits to quit                History of Present Illness     URI   This is a new problem. The current episode started in the past 7 days. There has been no fever. Associated symptoms include congestion, coughing, ear pain, headaches, a plugged ear sensation and a sore throat. Pertinent negatives include no abdominal pain, chest pain, dysuria, rash, vomiting or wheezing. She has tried increased fluids, acetaminophen and antihistamine for the symptoms. The treatment provided no relief.     Review of Systems   Constitutional:  Negative for chills and fever.   HENT:  Positive for congestion, ear pain and sore throat.    Eyes:  Negative for pain and visual disturbance.   Respiratory:  Positive for cough. Negative for shortness of breath and wheezing.    Cardiovascular:  Negative for chest pain and palpitations.   Gastrointestinal:  Negative for abdominal pain and vomiting.   Genitourinary:  Negative for dysuria and hematuria.   Musculoskeletal:  Negative for arthralgias and back pain.   Skin:  Negative for color change and rash.   Neurological:  Positive for headaches. Negative for seizures and syncope.   All other systems reviewed and are negative.      Objective   /74 (BP Location: Right arm, Patient Position: Sitting, Cuff Size: Standard)   Pulse 88   Temp 97.9 °F (36.6 °C) (Temporal)   Resp 16   Ht 5' 3\" (1.6 m)   Wt 58.5 kg (129 lb)   " SpO2 98%   BMI 22.85 kg/m²      Physical Exam  Constitutional:       Appearance: Normal appearance.   HENT:      Head: Normocephalic.      Ears:      Comments: Red TMS bilat     Nose: Congestion present.      Mouth/Throat:      Pharynx: Posterior oropharyngeal erythema present. No oropharyngeal exudate.   Eyes:      Extraocular Movements: Extraocular movements intact.      Pupils: Pupils are equal, round, and reactive to light.   Cardiovascular:      Rate and Rhythm: Normal rate and regular rhythm.   Pulmonary:      Effort: Pulmonary effort is normal.      Breath sounds: Normal breath sounds.   Musculoskeletal:      Right lower leg: No edema.      Left lower leg: No edema.   Lymphadenopathy:      Cervical: No cervical adenopathy.   Skin:     Findings: No rash.   Neurological:      General: No focal deficit present.      Mental Status: She is alert.   Psychiatric:         Mood and Affect: Mood normal.         Behavior: Behavior normal.

## 2024-12-27 NOTE — ASSESSMENT & PLAN NOTE
Complains of increasing sinus congestion blocked ears sore throat and headaches purulent discharge we will treat her as a sinusitis    Orders:    sulfamethoxazole-trimethoprim (BACTRIM DS) 800-160 mg per tablet; Take 1 tablet by mouth 2 (two) times a day for 10 days

## 2025-01-26 PROBLEM — J01.40 ACUTE NON-RECURRENT PANSINUSITIS: Status: RESOLVED | Noted: 2024-12-27 | Resolved: 2025-01-26

## 2025-01-31 ENCOUNTER — TELEPHONE (OUTPATIENT)
Dept: OBGYN CLINIC | Facility: HOSPITAL | Age: 50
End: 2025-01-31

## 2025-02-27 ENCOUNTER — CLINICAL SUPPORT (OUTPATIENT)
Dept: CARDIOLOGY CLINIC | Facility: CLINIC | Age: 50
End: 2025-02-27
Payer: COMMERCIAL

## 2025-02-27 DIAGNOSIS — Z13.29 THYROID DISORDER SCREEN: Primary | ICD-10-CM

## 2025-02-27 DIAGNOSIS — R00.2 PALPITATIONS: Primary | ICD-10-CM

## 2025-02-27 PROCEDURE — 99211 OFF/OP EST MAY X REQ PHY/QHP: CPT

## 2025-02-27 NOTE — PROGRESS NOTES
Patient was seen in the office today for a nurse visit Biotel monitor placement as per Dr. Valle.    Monitor placement was successful.  Pt understood wear and care instructions for device.

## 2025-03-11 ENCOUNTER — RESULTS FOLLOW-UP (OUTPATIENT)
Dept: CARDIOLOGY CLINIC | Facility: CLINIC | Age: 50
End: 2025-03-11

## 2025-03-11 ENCOUNTER — CLINICAL SUPPORT (OUTPATIENT)
Dept: CARDIOLOGY CLINIC | Facility: CLINIC | Age: 50
End: 2025-03-11
Payer: COMMERCIAL

## 2025-03-11 DIAGNOSIS — R00.2 PALPITATIONS: ICD-10-CM

## 2025-03-11 PROCEDURE — 93272 ECG/REVIEW INTERPRET ONLY: CPT | Performed by: STUDENT IN AN ORGANIZED HEALTH CARE EDUCATION/TRAINING PROGRAM

## 2025-03-14 ENCOUNTER — OFFICE VISIT (OUTPATIENT)
Dept: CARDIOLOGY CLINIC | Facility: CLINIC | Age: 50
End: 2025-03-14
Payer: COMMERCIAL

## 2025-03-14 VITALS
DIASTOLIC BLOOD PRESSURE: 70 MMHG | RESPIRATION RATE: 16 BRPM | SYSTOLIC BLOOD PRESSURE: 112 MMHG | HEART RATE: 90 BPM | OXYGEN SATURATION: 100 % | WEIGHT: 129 LBS | BODY MASS INDEX: 22.02 KG/M2 | HEIGHT: 64 IN

## 2025-03-14 DIAGNOSIS — R07.89 CHEST DISCOMFORT: Primary | ICD-10-CM

## 2025-03-14 DIAGNOSIS — M54.2 NECK PAIN: ICD-10-CM

## 2025-03-14 DIAGNOSIS — R00.2 PALPITATIONS: ICD-10-CM

## 2025-03-14 DIAGNOSIS — Z13.1 SCREENING FOR DIABETES MELLITUS: ICD-10-CM

## 2025-03-14 DIAGNOSIS — R07.89 ATYPICAL CHEST PAIN: ICD-10-CM

## 2025-03-14 PROCEDURE — 99214 OFFICE O/P EST MOD 30 MIN: CPT | Performed by: STUDENT IN AN ORGANIZED HEALTH CARE EDUCATION/TRAINING PROGRAM

## 2025-03-14 RX ORDER — METOPROLOL TARTRATE 100 MG/1
100 TABLET ORAL EVERY 12 HOURS SCHEDULED
Qty: 1 TABLET | Refills: 0 | Status: SHIPPED | OUTPATIENT
Start: 2025-03-14 | End: 2025-03-18 | Stop reason: SDUPTHER

## 2025-03-14 NOTE — PROGRESS NOTES
[facility]  Follow up note  Karine Downs 49 y.o. female MRN: [id number]        1. Chest discomfort  -     CTA Cardiac w FFR if needed; Future; Expected date: 03/14/2025  -     metoprolol tartrate (LOPRESSOR) 100 mg tablet; Take 1 tablet (100 mg total) by mouth every 12 (twelve) hours  -     Lipid Panel with Direct LDL reflex; Future  -     Basic metabolic panel; Future  2. Palpitations  -     Lipid Panel with Direct LDL reflex; Future  -     TSH w/Reflex; Future  -     Basic metabolic panel; Future  3. Screening for diabetes mellitus  -     Hemoglobin A1C; Future  4. Neck pain  -     VAS carotid complete study; Future; Expected date: 03/14/2025  5. Atypical chest pain      Assessment & Plan  Chest discomfort  Patient presenting with chest pain which were described as a pressure-like sensation that radiates to her left neck.  Patient is her associate with palpitations.  Patient has not had any presyncope or syncopal symptoms, no symptoms concerning for heart failure.  She does report that she does have family history significant for coronary disease, is a current smoker, and has hyperlipidemia.  Although her symptoms are atypical given her multiple risk factors we will rule out ischemic etiology with a CT angiogram.  She will be prescribed a one-time dose of metoprolol prior to imaging.  Palpitations  See above  Screening for diabetes mellitus  Hemoglobin A1c will be obtained and part of cardiac restratification  Atypical chest pain  See above        HPI:   Karine Downs is a 49 y.o. year old female current smoker, family history of coronary artery disease, hyperlipidemia who presented with atypical chest pain with radiation to the neck.     Currently denies lower extremity swelling, orthopnea, PND.    Lab Results   Component Value Date    LDLCALC 126 (H) 03/15/2025     Lab Results   Component Value Date    CHOLESTEROL 201 (H) 03/15/2025    CHOLESTEROL 174 04/22/2023     Lab Results   Component Value Date    HDL  50 03/15/2025    HDL 51 04/22/2023    HDL 48 05/23/2015     Lab Results   Component Value Date    TRIG 126 03/15/2025    TRIG 106 04/22/2023    TRIG 109 05/23/2015     Lab Results   Component Value Date    NONHDLC 123 04/22/2023     Lab Results   Component Value Date    HGBA1C 5.8 (H) 03/15/2025     The 10-year ASCVD risk score (Ananth PINZON, et al., 2019) is: 2.7%    Values used to calculate the score:      Age: 49 years      Sex: Female      Is Non- : No      Diabetic: No      Tobacco smoker: Yes      Systolic Blood Pressure: 112 mmHg      Is BP treated: No      HDL Cholesterol: 50 mg/dL      Total Cholesterol: 201 mg/dL        Review of Systems    Past Medical History:   Diagnosis Date    Allergic     Penicillin    Internal derangement of knee joint, left     resolved 10/04/2017    Lyme disease     PONV (postoperative nausea and vomiting)     Varicella      Social History     Substance and Sexual Activity   Alcohol Use Yes    Comment: socially     Social History     Substance and Sexual Activity   Drug Use No     Social History     Tobacco Use   Smoking Status Every Day    Current packs/day: 0.50    Average packs/day: 0.7 packs/day for 30.0 years (20.0 ttl pk-yrs)    Types: Cigarettes   Smokeless Tobacco Never       Allergies:  Allergies   Allergen Reactions    Penicillins Other (See Comments)     Childhood allergy       Medications:     Current Outpatient Medications:     5-Hydroxytryptophan (5-HTP) 100 MG CAPS, in the morning, Disp: , Rfl:     Ascorbic Acid (VITAMIN C PO), Take by mouth in the morning, Disp: , Rfl:     Cholecalciferol (VITAMIN D3) 1000 units CAPS, Daily, Disp: , Rfl:     Cyanocobalamin (VITAMIN B-12 PO), Take by mouth in the morning, Disp: , Rfl:     MAGNESIUM PO, Take by mouth in the morning, Disp: , Rfl:     metoprolol tartrate (LOPRESSOR) 100 mg tablet, Take 1 tablet (100 mg total) by mouth every 12 (twelve) hours, Disp: 1 tablet, Rfl: 0    Multiple Vitamin  "(MULTIVITAMIN) tablet, Take 1 tablet by mouth daily, Disp: , Rfl:     Progesterone 100 MG CAPS, Take 1 capsule by mouth daily at bedtime, Disp: 90 capsule, Rfl: 4    Turmeric (QC Tumeric Complex) 500 MG CAPS, in the morning, Disp: , Rfl:       Vitals:    03/14/25 0800   BP: 112/70   Pulse: 90   Resp: 16   SpO2: 100%     Weight (last 2 days)       None          Physical Exam  Vitals and nursing note reviewed.   Constitutional:       General: She is not in acute distress.     Appearance: She is well-developed.   HENT:      Head: Normocephalic and atraumatic.   Eyes:      Conjunctiva/sclera: Conjunctivae normal.   Cardiovascular:      Rate and Rhythm: Normal rate and regular rhythm.      Heart sounds: No murmur heard.  Pulmonary:      Effort: Pulmonary effort is normal. No respiratory distress.      Breath sounds: Normal breath sounds.   Musculoskeletal:         General: No swelling.      Cervical back: Neck supple.   Skin:     General: Skin is warm and dry.      Capillary Refill: Capillary refill takes less than 2 seconds.   Neurological:      Mental Status: She is alert.   Psychiatric:         Mood and Affect: Mood normal.         Laboratory Studies:    Laboratory studies personally reviewed    Cardiac testing:     Stress test from 11/2023 reviewed, event monitor from 3/11/2025 reviewed        Raisa Floyd MD    Portions of the record may have been created with voice recognition software.  Occasional wrong word or \"sound a like\" substitutions may have occurred due to the inherent limitations of voice recognition software.  Read the chart carefully and recognize, using context, where substitutions have occurred.   "

## 2025-03-15 ENCOUNTER — APPOINTMENT (OUTPATIENT)
Dept: LAB | Facility: HOSPITAL | Age: 50
End: 2025-03-15
Payer: COMMERCIAL

## 2025-03-15 DIAGNOSIS — R07.89 CHEST DISCOMFORT: ICD-10-CM

## 2025-03-15 DIAGNOSIS — R00.2 PALPITATIONS: ICD-10-CM

## 2025-03-15 DIAGNOSIS — Z13.1 SCREENING FOR DIABETES MELLITUS: ICD-10-CM

## 2025-03-15 LAB
ANION GAP SERPL CALCULATED.3IONS-SCNC: 4 MMOL/L (ref 4–13)
BUN SERPL-MCNC: 17 MG/DL (ref 5–25)
CALCIUM SERPL-MCNC: 9.8 MG/DL (ref 8.4–10.2)
CHLORIDE SERPL-SCNC: 104 MMOL/L (ref 96–108)
CHOLEST SERPL-MCNC: 201 MG/DL (ref ?–200)
CO2 SERPL-SCNC: 31 MMOL/L (ref 21–32)
CREAT SERPL-MCNC: 0.9 MG/DL (ref 0.6–1.3)
EST. AVERAGE GLUCOSE BLD GHB EST-MCNC: 120 MG/DL
GFR SERPL CREATININE-BSD FRML MDRD: 76 ML/MIN/1.73SQ M
GLUCOSE P FAST SERPL-MCNC: 90 MG/DL (ref 65–99)
HBA1C MFR BLD: 5.8 %
HDLC SERPL-MCNC: 50 MG/DL
LDLC SERPL CALC-MCNC: 126 MG/DL (ref 0–100)
POTASSIUM SERPL-SCNC: 4.6 MMOL/L (ref 3.5–5.3)
SODIUM SERPL-SCNC: 139 MMOL/L (ref 135–147)
TRIGL SERPL-MCNC: 126 MG/DL (ref ?–150)
TSH SERPL DL<=0.05 MIU/L-ACNC: 1.08 UIU/ML (ref 0.45–4.5)

## 2025-03-15 PROCEDURE — 36415 COLL VENOUS BLD VENIPUNCTURE: CPT

## 2025-03-15 PROCEDURE — 80061 LIPID PANEL: CPT

## 2025-03-15 PROCEDURE — 83036 HEMOGLOBIN GLYCOSYLATED A1C: CPT

## 2025-03-15 PROCEDURE — 80048 BASIC METABOLIC PNL TOTAL CA: CPT

## 2025-03-18 ENCOUNTER — RESULTS FOLLOW-UP (OUTPATIENT)
Dept: OBGYN CLINIC | Facility: CLINIC | Age: 50
End: 2025-03-18

## 2025-03-18 ENCOUNTER — TELEPHONE (OUTPATIENT)
Dept: CARDIOLOGY CLINIC | Facility: CLINIC | Age: 50
End: 2025-03-18

## 2025-03-18 ENCOUNTER — HOSPITAL ENCOUNTER (OUTPATIENT)
Dept: NON INVASIVE DIAGNOSTICS | Facility: CLINIC | Age: 50
Discharge: HOME | End: 2025-03-18
Payer: COMMERCIAL

## 2025-03-18 ENCOUNTER — RESULTS FOLLOW-UP (OUTPATIENT)
Dept: CARDIOLOGY CLINIC | Facility: CLINIC | Age: 50
End: 2025-03-18

## 2025-03-18 DIAGNOSIS — M54.2 NECK PAIN: ICD-10-CM

## 2025-03-18 DIAGNOSIS — R07.89 CHEST DISCOMFORT: ICD-10-CM

## 2025-03-18 PROCEDURE — 93880 EXTRACRANIAL BILAT STUDY: CPT

## 2025-03-18 PROCEDURE — 93880 EXTRACRANIAL BILAT STUDY: CPT | Performed by: SURGERY

## 2025-03-18 RX ORDER — METOPROLOL TARTRATE 100 MG/1
100 TABLET ORAL ONCE
Qty: 1 TABLET | Refills: 0 | Status: SHIPPED | OUTPATIENT
Start: 2025-03-18 | End: 2025-03-18

## 2025-03-18 NOTE — TELEPHONE ENCOUNTER
Paulette Call MA routed this conversation to [facility] [place] [facility]  Karine Downs to [facility] (supporting Raisa Floyd MD)         3/18/25  8:43 AM  Thank you!  Lastly as mentioned in the message yesterday, [facility] has not released the single dose of  metoprolol

## 2025-03-21 ENCOUNTER — HOSPITAL ENCOUNTER (OUTPATIENT)
Dept: CT IMAGING | Facility: HOSPITAL | Age: 50
End: 2025-03-21
Attending: STUDENT IN AN ORGANIZED HEALTH CARE EDUCATION/TRAINING PROGRAM
Payer: COMMERCIAL

## 2025-03-21 VITALS — SYSTOLIC BLOOD PRESSURE: 102 MMHG | HEART RATE: 64 BPM | DIASTOLIC BLOOD PRESSURE: 60 MMHG

## 2025-03-21 DIAGNOSIS — R07.89 CHEST DISCOMFORT: ICD-10-CM

## 2025-03-21 PROCEDURE — 75574 CT ANGIO HRT W/3D IMAGE: CPT

## 2025-03-21 RX ORDER — NITROGLYCERIN 0.4 MG/1
0.8 TABLET SUBLINGUAL ONCE
Status: COMPLETED | OUTPATIENT
Start: 2025-03-21 | End: 2025-03-21

## 2025-03-21 RX ORDER — METOPROLOL TARTRATE 1 MG/ML
5 INJECTION, SOLUTION INTRAVENOUS
Status: DISCONTINUED | OUTPATIENT
Start: 2025-03-21 | End: 2025-03-25 | Stop reason: HOSPADM

## 2025-03-21 RX ADMIN — NITROGLYCERIN 0.8 MG: 0.4 TABLET SUBLINGUAL at 15:30

## 2025-03-21 RX ADMIN — IOHEXOL 68 ML: 350 INJECTION, SOLUTION INTRAVENOUS at 15:37

## 2025-03-21 NOTE — NURSING NOTE
Patient arrived for coronary CT angiography. Test education reviewed with patient. Medications and allergies verified. Pt denies PDE5 inhibitor use. Patient took 50mg metoprolol as instructed by cardiology. SL nitro given per protocol. See vitals flowsheet. Tolerated test well. Instructed to increase fluid intake remainder of day. Vitals stable, patient asymptomatic upon departure with friend.

## 2025-03-22 DIAGNOSIS — E78.2 COMBINED HYPERLIPIDEMIA: Primary | ICD-10-CM

## 2025-03-22 RX ORDER — ATORVASTATIN CALCIUM 20 MG/1
20 TABLET, FILM COATED ORAL DAILY
Qty: 90 TABLET | Refills: 3 | Status: SHIPPED | OUTPATIENT
Start: 2025-03-22

## 2025-07-04 DIAGNOSIS — N95.1 PERIMENOPAUSAL SYMPTOMS: ICD-10-CM

## 2025-07-08 ENCOUNTER — PATIENT MESSAGE (OUTPATIENT)
Age: 50
End: 2025-07-08

## 2025-07-08 DIAGNOSIS — N95.1 PERIMENOPAUSAL SYMPTOMS: ICD-10-CM

## 2025-07-08 RX ORDER — PROGESTERONE 100 MG/1
1 CAPSULE ORAL
Qty: 90 CAPSULE | Refills: 4 | Status: SHIPPED | OUTPATIENT
Start: 2025-07-08

## 2025-07-08 RX ORDER — PROGESTERONE 100 MG/1
1 CAPSULE ORAL
Qty: 90 CAPSULE | Refills: 4 | OUTPATIENT
Start: 2025-07-08

## 2025-08-12 ENCOUNTER — OFFICE VISIT (OUTPATIENT)
Dept: FAMILY MEDICINE CLINIC | Facility: CLINIC | Age: 50
End: 2025-08-12
Payer: COMMERCIAL

## 2025-08-12 PROBLEM — J00 HEAD COLD: Status: ACTIVE | Noted: 2022-04-15

## (undated) DEVICE — WET SKIN PREP TRAY: Brand: MEDLINE INDUSTRIES, INC.

## (undated) DEVICE — 3M™ STERI-STRIP™ REINFORCED ADHESIVE SKIN CLOSURES, R1547, 1/2 IN X 4 IN (12 MM X 100 MM), 6 STRIPS/ENVELOPE: Brand: 3M™ STERI-STRIP™

## (undated) DEVICE — HARMONIC 1100 SHEARS, 36CM SHAFT LENGTH: Brand: HARMONIC

## (undated) DEVICE — GLOVE PI ULTRA TOUCH SZ.7.5

## (undated) DEVICE — SYRINGE 10ML LL

## (undated) DEVICE — TROCAR: Brand: KII® SLEEVE

## (undated) DEVICE — LIGHT GLOVE GREEN

## (undated) DEVICE — ACE WRAP 4 IN STERILE

## (undated) DEVICE — ARM SLING: Brand: DEROYAL

## (undated) DEVICE — OCCLUSIVE GAUZE STRIP,3% BISMUTH TRIBROMOPHENATE IN PETROLATUM BLEND: Brand: XEROFORM

## (undated) DEVICE — GLOVE INDICATOR PI UNDERGLOVE SZ 6.5 BLUE

## (undated) DEVICE — GLOVE SRG BIOGEL 6.5

## (undated) DEVICE — INSUFLATION TUBING INSUFLOW (LEXION)

## (undated) DEVICE — TOURNIQUET HEMACLEAR 24-40CM YELLOW STRL

## (undated) DEVICE — CUFF TOURNIQUET 18 X 4 IN QUICK CONNECT DISP 1 BLADDER

## (undated) DEVICE — SUT MONOCRYL 4-0 PS-2 18 IN Y496G

## (undated) DEVICE — GLOVE SRG BIOGEL 7

## (undated) DEVICE — PVC URETHRAL CATHETER: Brand: DOVER

## (undated) DEVICE — ADHESIVE SKIN HIGH VISCOSITY EXOFIN 1ML

## (undated) DEVICE — GLOVE PI ULTRA TOUCH SZ.6.5

## (undated) DEVICE — SUT MONOCRYL 3-0 SH 27 IN Y416H

## (undated) DEVICE — BETHLEHEM UNIVERSAL MINOR VAG: Brand: CARDINAL HEALTH

## (undated) DEVICE — KNIFE LIGHT 10,PK: Brand: KNIFELIGHT

## (undated) DEVICE — STERILE BETHLEHEM PLASTIC HAND: Brand: CARDINAL HEALTH

## (undated) DEVICE — INTENDED FOR TISSUE SEPARATION, AND OTHER PROCEDURES THAT REQUIRE A SHARP SURGICAL BLADE TO PUNCTURE OR CUT.: Brand: BARD-PARKER ® CARBON RIB-BACK BLADES

## (undated) DEVICE — GLOVE INDICATOR PI UNDERGLOVE SZ 7 BLUE

## (undated) DEVICE — TRAY FOLEY 16FR URIMETER SILICONE SURESTEP

## (undated) DEVICE — NOVASURE ADVANCED DEVICE

## (undated) DEVICE — ACE WRAP 4 IN UNSTERILE

## (undated) DEVICE — PREP PAD BNS: Brand: CONVERTORS

## (undated) DEVICE — NEEDLE 25G X 1 1/2

## (undated) DEVICE — BLADE MINI RND TIP ONE SIDE SHARP

## (undated) DEVICE — Device: Brand: MEDEX

## (undated) DEVICE — SUT PROLENE 4-0 PS-2 18 IN 8682G

## (undated) DEVICE — INTENDED FOR TISSUE SEPARATION, AND OTHER PROCEDURES THAT REQUIRE A SHARP SURGICAL BLADE TO PUNCTURE OR CUT.: Brand: BARD-PARKER SAFETY BLADES SIZE 11, STERILE

## (undated) DEVICE — PAD CAST 4 IN COTTON NON STERILE

## (undated) DEVICE — SUT MONOCRYL 3-0 PS-2 18 IN Y497G

## (undated) DEVICE — IRRIG ENDO FLO TUBING

## (undated) DEVICE — GLOVE INDICATOR PI UNDERGLOVE SZ 7.5 BLUE

## (undated) DEVICE — 3000CC GUARDIAN II: Brand: GUARDIAN

## (undated) DEVICE — CHLORAPREP HI-LITE 26ML ORANGE

## (undated) DEVICE — ADHESIVE SKIN HIGH VISCOSITY EXOFIN MICRO 0.5ML

## (undated) DEVICE — CURITY STRETCH BANDAGE: Brand: CURITY

## (undated) DEVICE — DRAPE LAPAROTOMY W/POUCHES

## (undated) DEVICE — SYRINGE 50ML LL

## (undated) DEVICE — GAUZE SPONGES,16 PLY: Brand: CURITY

## (undated) DEVICE — MAYO STAND COVER: Brand: CONVERTORS